# Patient Record
Sex: FEMALE | Race: BLACK OR AFRICAN AMERICAN | NOT HISPANIC OR LATINO | Employment: OTHER | URBAN - METROPOLITAN AREA
[De-identification: names, ages, dates, MRNs, and addresses within clinical notes are randomized per-mention and may not be internally consistent; named-entity substitution may affect disease eponyms.]

---

## 2017-08-01 ENCOUNTER — TELEPHONE (OUTPATIENT)
Dept: HEMATOLOGY/ONCOLOGY | Facility: CLINIC | Age: 82
End: 2017-08-01

## 2017-08-01 NOTE — TELEPHONE ENCOUNTER
----- Message from Ramez Bonner sent at 8/1/2017 11:06 AM CDT -----  Contact: Pamela ( at the [facility])  Please call Pamela ( at the [facility]) to schedule an appointment for the Patient. Contact Number [phone].      Called patient to make appointment for Anemia consult with Dr Stauffer. Patient wanted the appointment with Dr Wang. Patient said that she was in the ER(outside [facility]) yesterday. She had a blood transfusion. She said that she will bring her ER records to the clinic visit.

## 2017-08-03 ENCOUNTER — LAB VISIT (OUTPATIENT)
Dept: LAB | Facility: HOSPITAL | Age: 82
End: 2017-08-03
Payer: MEDICARE

## 2017-08-03 ENCOUNTER — INITIAL CONSULT (OUTPATIENT)
Dept: HEMATOLOGY/ONCOLOGY | Facility: CLINIC | Age: 82
End: 2017-08-03
Payer: MEDICARE

## 2017-08-03 VITALS
WEIGHT: 127.44 LBS | BODY MASS INDEX: 21.23 KG/M2 | DIASTOLIC BLOOD PRESSURE: 79 MMHG | SYSTOLIC BLOOD PRESSURE: 190 MMHG | TEMPERATURE: 99 F | HEIGHT: 65 IN

## 2017-08-03 DIAGNOSIS — D69.6 THROMBOCYTOPENIA: ICD-10-CM

## 2017-08-03 DIAGNOSIS — D63.8 ANEMIA OF OTHER CHRONIC DISEASE: ICD-10-CM

## 2017-08-03 DIAGNOSIS — E03.9 HYPOTHYROIDISM: ICD-10-CM

## 2017-08-03 DIAGNOSIS — D63.8 ANEMIA OF OTHER CHRONIC DISEASE: Primary | ICD-10-CM

## 2017-08-03 LAB
ABO + RH BLD: NORMAL
ALBUMIN SERPL BCP-MCNC: 3.5 G/DL
ALP SERPL-CCNC: 83 U/L
ALT SERPL W/O P-5'-P-CCNC: 19 U/L
ANION GAP SERPL CALC-SCNC: 7 MMOL/L
AST SERPL-CCNC: 29 U/L
BASOPHILS # BLD AUTO: 0 K/UL
BASOPHILS NFR BLD: 0 %
BILIRUB SERPL-MCNC: 0.9 MG/DL
BLD GP AB SCN CELLS X3 SERPL QL: NORMAL
BUN SERPL-MCNC: 17 MG/DL
CALCIUM SERPL-MCNC: 9.6 MG/DL
CHLORIDE SERPL-SCNC: 109 MMOL/L
CO2 SERPL-SCNC: 26 MMOL/L
CREAT SERPL-MCNC: 1.4 MG/DL
DAT IGG-SP REAG RBC-IMP: NORMAL
DIFFERENTIAL METHOD: ABNORMAL
EOSINOPHIL # BLD AUTO: 0.4 K/UL
EOSINOPHIL NFR BLD: 16.9 %
ERYTHROCYTE [DISTWIDTH] IN BLOOD BY AUTOMATED COUNT: 24.9 %
EST. GFR  (AFRICAN AMERICAN): 40.6 ML/MIN/1.73 M^2
EST. GFR  (NON AFRICAN AMERICAN): 35.3 ML/MIN/1.73 M^2
FERRITIN SERPL-MCNC: 576 NG/ML
FOLATE SERPL-MCNC: 35.2 NG/ML
GLUCOSE SERPL-MCNC: 89 MG/DL
HAPTOGLOB SERPL-MCNC: 62 MG/DL
HCT VFR BLD AUTO: 24.7 %
HGB BLD-MCNC: 8.5 G/DL
IRON SERPL-MCNC: 122 UG/DL
LDH SERPL L TO P-CCNC: 184 U/L
LYMPHOCYTES # BLD AUTO: 1.3 K/UL
LYMPHOCYTES NFR BLD: 55 %
MCH RBC QN AUTO: 32 PG
MCHC RBC AUTO-ENTMCNC: 34.4 G/DL
MCV RBC AUTO: 93 FL
MONOCYTES # BLD AUTO: 0.1 K/UL
MONOCYTES NFR BLD: 5.2 %
NEUTROPHILS # BLD AUTO: 0.5 K/UL
NEUTROPHILS NFR BLD: 22.9 %
PATH REV BLD -IMP: NORMAL
PLATELET # BLD AUTO: 24 K/UL
PLATELET BLD QL SMEAR: ABNORMAL
PMV BLD AUTO: ABNORMAL FL
POTASSIUM SERPL-SCNC: 3.8 MMOL/L
PROT SERPL-MCNC: 8.5 G/DL
RBC # BLD AUTO: 2.66 M/UL
RETICS/RBC NFR AUTO: 1 %
SATURATED IRON: 58 %
SODIUM SERPL-SCNC: 142 MMOL/L
TOTAL IRON BINDING CAPACITY: 209 UG/DL
TRANSFERRIN SERPL-MCNC: 141 MG/DL
TSH SERPL DL<=0.005 MIU/L-ACNC: 2.38 UIU/ML
VIT B12 SERPL-MCNC: 362 PG/ML
WBC # BLD AUTO: 2.31 K/UL

## 2017-08-03 PROCEDURE — 84165 PROTEIN E-PHORESIS SERUM: CPT | Mod: 26,,, | Performed by: PATHOLOGY

## 2017-08-03 PROCEDURE — 86334 IMMUNOFIX E-PHORESIS SERUM: CPT

## 2017-08-03 PROCEDURE — 99214 OFFICE O/P EST MOD 30 MIN: CPT | Mod: S$GLB,,, | Performed by: INTERNAL MEDICINE

## 2017-08-03 PROCEDURE — 83010 ASSAY OF HAPTOGLOBIN QUANT: CPT

## 2017-08-03 PROCEDURE — 86900 BLOOD TYPING SEROLOGIC ABO: CPT

## 2017-08-03 PROCEDURE — 1159F MED LIST DOCD IN RCRD: CPT | Mod: S$GLB,,, | Performed by: INTERNAL MEDICINE

## 2017-08-03 PROCEDURE — 85045 AUTOMATED RETICULOCYTE COUNT: CPT

## 2017-08-03 PROCEDURE — 99999 PR PBB SHADOW E&M-EST. PATIENT-LVL V: CPT | Mod: PBBFAC,,, | Performed by: INTERNAL MEDICINE

## 2017-08-03 PROCEDURE — 84443 ASSAY THYROID STIM HORMONE: CPT

## 2017-08-03 PROCEDURE — 83615 LACTATE (LD) (LDH) ENZYME: CPT

## 2017-08-03 PROCEDURE — 86880 COOMBS TEST DIRECT: CPT

## 2017-08-03 PROCEDURE — 82746 ASSAY OF FOLIC ACID SERUM: CPT

## 2017-08-03 PROCEDURE — 86901 BLOOD TYPING SEROLOGIC RH(D): CPT

## 2017-08-03 PROCEDURE — 1126F AMNT PAIN NOTED NONE PRSNT: CPT | Mod: S$GLB,,, | Performed by: INTERNAL MEDICINE

## 2017-08-03 PROCEDURE — 82728 ASSAY OF FERRITIN: CPT

## 2017-08-03 PROCEDURE — 83540 ASSAY OF IRON: CPT

## 2017-08-03 PROCEDURE — 84165 PROTEIN E-PHORESIS SERUM: CPT

## 2017-08-03 PROCEDURE — 86334 IMMUNOFIX E-PHORESIS SERUM: CPT | Mod: 26,,, | Performed by: PATHOLOGY

## 2017-08-03 PROCEDURE — 85025 COMPLETE CBC W/AUTO DIFF WBC: CPT

## 2017-08-03 PROCEDURE — 36415 COLL VENOUS BLD VENIPUNCTURE: CPT

## 2017-08-03 PROCEDURE — 82607 VITAMIN B-12: CPT

## 2017-08-03 PROCEDURE — 80053 COMPREHEN METABOLIC PANEL: CPT

## 2017-08-03 NOTE — PROGRESS NOTES
HEMATOLOGY/ONCOLOGY CONSULT    Requesting physician: Dr. Carter     Reason for consult: Anemia    HPI:   Shara Rose is a 82 y.o. female who is referred for a Hematology consultation for further evaluation of anemia. Patient's past medical history includes Sickle Cell Trait, Hypothyroidism, Chronic Kidney Disease, and Normocytic Anemia who was previously seen by Dr. Flores in the clinic for evaluation of anemia in 2011 and 2014. Underwent extensive work-up including bone marrow evaluation in 2011 which showed 80% cellularity with erythroid hyperplasia and megaloblastoid maturation of erythroid precursors. Cytogenetics were unremarkable. Adequate storage iron with increased ring sideroblast, adequate number of megakaryocytes. Blasts were only 1.5%. Refractory anemia with ring sideroblasts was considered as differential. She also had Hb Electrophoresis performed which was compatible with the patient's history of Sickle Cell Trait. During that visit, she was noted to have elevated LEVON and was referred to Rheumatology and was lost to follow up. Over the course of last several years, her baseline Hb of 9-10 g/dL has gradually declined along with onset of severe thrombocytopenia since 2014. Last lab work in our records from 9/14/16 showed Hb: 8.1 g/dL and Plt count of 31,000/mm3. She states for past 6 months, she has been severely anemic, requiring blood transfusions every 2 months. Has received total of 6 units of blood already this year. Most recent being last week when she presented to the PCP at an outside clinic with complaint of weakness and was found to have severe anemia with Hb of 4 g/dL. She was admitted in [facility] and was transfused 2 units of blood and referred here for further evaluation. She denies any melena or hematuria. Last colonoscopy was in 2012, which was unremarkable. Denies any weight loss, or fever/chills. However, she does report night sweats. Denies any lymphadenopathy or  rash.      Past Medical History:   Diagnosis Date    Allergy     Anemia     Arthritis     Cataract     CKD (chronic kidney disease)     Gout, unspecified     Hypertension     Hypothyroidism     Menopause     Sickle cell trait     Trigger finger      Past Surgical History:   Procedure Laterality Date    APPENDECTOMY      HEMORRHOID SURGERY      HYSTERECTOMY       Social History     Social History    Marital status:      Spouse name: N/A    Number of children: N/A    Years of education: N/A     Occupational History    Not on file.     Social History Main Topics    Smoking status: Former Smoker     Packs/day: 0.25     Years: 1.00     Types: Cigarettes     Quit date: 10/19/1972    Smokeless tobacco: Never Used      Comment: 10 yrs; she smoked 2 packs a week.    Alcohol use No    Drug use: No    Sexual activity: No     Other Topics Concern    Not on file     Social History Narrative    No narrative on file     Family History   Problem Relation Age of Onset    Hypertension Mother     Diabetes Neg Hx     Amblyopia Neg Hx     Blindness Neg Hx     Glaucoma Neg Hx     Macular degeneration Neg Hx     Retinal detachment Neg Hx     Strabismus Neg Hx     Peripheral vascular disease Father     Heart disease Father     Cataracts Father     Cancer Son      Allergies:   Sulfa (sulfonamide antibiotics)    Medications:     Current Outpatient Prescriptions   Medication Sig Dispense Refill    clotrimazole-betamethasone 1-0.05% (LOTRISONE) cream Apply topically 2 (two) times daily. 45 g 3    levothyroxine (SYNTHROID) 25 MCG tablet TAKE 1 TABLET ONE TIME DAILY 90 tablet 3    valsartan (DIOVAN) 80 MG tablet TAKE 1 TABLET ONE TIME DAILY 90 tablet 3    verapamil (CALAN-SR) 240 MG CR tablet TAKE 1 TABLET EVERY DAY 90 tablet 3     No current facility-administered medications for this visit.      Review Of Systems:   Gen: night sweats  HEENT: negative for headaches  Pulm: negative for shortness of  breath  CV: negative for palpitations or chest pain  GI: negative for abdominal pain  : negative  Skin: negative for rash  MS/Neuro: weakness  Psych: negative    Physical Exam:   Performance Status: 1  General: well developed, well nourished, appears stated age, no distress, thin female  Eyes: conjunctivae/corneas clear. PERRL..  HENT: Head:normocephalic, atraumatic. Ears:not examined. Nose: Nares normal. Septum midline. Mucosa normal. No drainage or sinus tenderness., no discharge. Throat: lips, mucosa, and tongue normal; teeth and gums normal and no throat erythema.  Neck: supple, symmetrical, trachea midline, no JVD and thyroid not enlarged, symmetric, no tenderness/mass/nodules  Lungs:  clear to auscultation bilaterally and normal respiratory effort  Cardiovascular: Heart: regular rate and rhythm, S1, S2 normal, no murmur, click, rub or gallop. Chest Wall: no tenderness. Extremities: no cyanosis or edema, or clubbing. Pulses: 2+ and symmetric.  Abdomen/Rectal: Abdomen: soft, non-tender non-distented; bowel sounds normal; no masses,  no organomegaly. Rectal: not examined  Skin: Skin color, texture, turgor normal. No rashes or lesions  Musculoskeletal:normal gait  Lymph Nodes: No cervical or supraclavicular adenopathy  Neurologic: Normal strength and tone. No focal numbness or weakness  Psych: pleasant and appropriate mood and affect      Diagnostic Tests:   Labs: Reviewed    Assessment:     1. Normocytic Anemia  2. Thrombocytopenia    Recommendations:     - likely patient has lower than normal baseline hemoglobin given her history of Sickle Cell Trait. However, given her age, gradual decline in the her counts over past 7 years and now with transfusion dependence, we are concerned about possible Myelodysplasia.   - will obtain routine lab work today to assess for hemolysis, nutritional deficiencies, and screening test for plasma cell dyscrasia.   - we discussed with patient that bone marrow evaluation is  absolute necessary to confirm our suspicion for MDS and she is in agreement for bone marrow biopsy. Consent obtained today.   - will plan to obtain biopsy hopefully next week on Monday August 7th.     Return to clinic with Dr. Wang 1 week after bone marrow biopsy with CBC.     Case discussed with Dr. Wang.     Esteban Zhong MD  Hematology/Oncology Fellow       Agree with Dr. Zhong's  history, physical, assessment, and plan with the following addendums.  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  Previously followed by Dr. Flores, but lost to fu.   Findings concerning for primary bone marrow process.  Needs bone marrow biopsy within the next week.  Nutritional and hemolytic parameters normal.  SPEP/IEF pending.    No indication for transfusions today .    FU:  Bone marrrow biopsy under sedation within the next week (urgent)  -cbc, cmp,type and screen and md appt in 2 weeks

## 2017-08-03 NOTE — LETTER
August 3, 2017      Alana Carter MD  [address] [facility]  [place] [address]           [facility]  [address]  [place] [address]  Phone: [phone]          Patient: Shara Rose   MR Number: [id number]   YOB: 1935   Date of Visit: 8/3/2017       Dear Dr. Alana Carter:    Thank you for referring Shara Rose to me for evaluation. Attached you will find relevant portions of my assessment and plan of care.    If you have questions, please do not hesitate to call me. I look forward to following Shara Rose along with you.    Sincerely,    Esteban Zhong MD    Enclosure  CC:  No Recipients    If you would like to receive this communication electronically, please contact [email or web address] or [phone] to request more information on [employer] access.    For providers and/or their staff who would like to refer a patient to [facility], please contact us through our one-stop-shop provider referral line, Narcisa Mc, at [phone].    If you feel you have received this communication in error or would no longer like to receive these types of communications, please e-mail [email or web address]

## 2017-08-04 ENCOUNTER — TELEPHONE (OUTPATIENT)
Dept: HEMATOLOGY/ONCOLOGY | Facility: CLINIC | Age: 82
End: 2017-08-04

## 2017-08-04 DIAGNOSIS — D64.9 ANEMIA, UNSPECIFIED TYPE: Primary | ICD-10-CM

## 2017-08-04 DIAGNOSIS — D69.6 THROMBOCYTOPENIA: ICD-10-CM

## 2017-08-04 DIAGNOSIS — D63.8 ANEMIA OF OTHER CHRONIC DISEASE: Primary | ICD-10-CM

## 2017-08-04 LAB
ALBUMIN SERPL ELPH-MCNC: 3.88 G/DL
ALPHA1 GLOB SERPL ELPH-MCNC: 0.28 G/DL
ALPHA2 GLOB SERPL ELPH-MCNC: 0.56 G/DL
B-GLOBULIN SERPL ELPH-MCNC: 0.85 G/DL
GAMMA GLOB SERPL ELPH-MCNC: 2.73 G/DL
INTERPRETATION SERPL IFE-IMP: NORMAL
PATHOLOGIST INTERPRETATION IFE: NORMAL
PROT SERPL-MCNC: 8.3 G/DL

## 2017-08-04 NOTE — TELEPHONE ENCOUNTER
Called pt to schedule BMBX , pt was told to report to the second floor in the main hospital Same Day Surgery Dept. Pt was told to be NPO starting at midnight the day prior to surgery. Pt was advised to hold all blood thinning medications prior to his BMBX & was advised to have a ride home. Pt voiced verbal understanding of these directions. Will also mail instruction sheet to pt's home.  Case request pended and routed to Oneyda Mejia.    Yue Hionjosa

## 2017-08-07 ENCOUNTER — HOSPITAL ENCOUNTER (OUTPATIENT)
Facility: HOSPITAL | Age: 82
Discharge: HOME | End: 2017-08-07
Attending: INTERNAL MEDICINE | Admitting: INTERNAL MEDICINE
Payer: MEDICARE

## 2017-08-07 ENCOUNTER — SURGERY (OUTPATIENT)
Age: 82
End: 2017-08-07

## 2017-08-07 ENCOUNTER — ANESTHESIA (OUTPATIENT)
Dept: SURGERY | Facility: HOSPITAL | Age: 82
End: 2017-08-07
Payer: MEDICARE

## 2017-08-07 ENCOUNTER — ANESTHESIA EVENT (OUTPATIENT)
Dept: SURGERY | Facility: HOSPITAL | Age: 82
End: 2017-08-07
Payer: MEDICARE

## 2017-08-07 VITALS
SYSTOLIC BLOOD PRESSURE: 185 MMHG | TEMPERATURE: 98 F | HEIGHT: 65 IN | OXYGEN SATURATION: 100 % | WEIGHT: 135 LBS | RESPIRATION RATE: 18 BRPM | HEART RATE: 65 BPM | DIASTOLIC BLOOD PRESSURE: 89 MMHG | BODY MASS INDEX: 22.49 KG/M2

## 2017-08-07 DIAGNOSIS — D69.1 THROMBOCYTOPATHIA: ICD-10-CM

## 2017-08-07 DIAGNOSIS — D64.9 ANEMIA: ICD-10-CM

## 2017-08-07 DIAGNOSIS — D69.6 THROMBOCYTOPENIA: Primary | ICD-10-CM

## 2017-08-07 LAB
BONE MARROW WRIGHT STAIN COMMENT: NORMAL
PATHOLOGIST INTERPRETATION SPE: NORMAL

## 2017-08-07 PROCEDURE — 88313 SPECIAL STAINS GROUP 2: CPT

## 2017-08-07 PROCEDURE — 88299 UNLISTED CYTOGENETIC STUDY: CPT

## 2017-08-07 PROCEDURE — 71000015 HC POSTOP RECOV 1ST HR: Performed by: INTERNAL MEDICINE

## 2017-08-07 PROCEDURE — 71000044 HC DOSC ROUTINE RECOVERY FIRST HOUR: Performed by: INTERNAL MEDICINE

## 2017-08-07 PROCEDURE — 88271 CYTOGENETICS DNA PROBE: CPT | Mod: 59

## 2017-08-07 PROCEDURE — 36000704 HC OR TIME LEV I 1ST 15 MIN: Performed by: INTERNAL MEDICINE

## 2017-08-07 PROCEDURE — 25000003 PHARM REV CODE 250: Performed by: INTERNAL MEDICINE

## 2017-08-07 PROCEDURE — 25000003 PHARM REV CODE 250: Performed by: NURSE ANESTHETIST, CERTIFIED REGISTERED

## 2017-08-07 PROCEDURE — D9220A PRA ANESTHESIA: Mod: ANES,,, | Performed by: ANESTHESIOLOGY

## 2017-08-07 PROCEDURE — 37000008 HC ANESTHESIA 1ST 15 MINUTES: Performed by: INTERNAL MEDICINE

## 2017-08-07 PROCEDURE — 88271 CYTOGENETICS DNA PROBE: CPT

## 2017-08-07 PROCEDURE — 88275 CYTOGENETICS 100-300: CPT

## 2017-08-07 PROCEDURE — 88275 CYTOGENETICS 100-300: CPT | Mod: 59

## 2017-08-07 PROCEDURE — 88274 CYTOGENETICS 25-99: CPT

## 2017-08-07 PROCEDURE — 88237 TISSUE CULTURE BONE MARROW: CPT

## 2017-08-07 PROCEDURE — 38221 DX BONE MARROW BIOPSIES: CPT | Mod: LT,,, | Performed by: NURSE PRACTITIONER

## 2017-08-07 PROCEDURE — 88342 IMHCHEM/IMCYTCHM 1ST ANTB: CPT | Mod: 26,59,, | Performed by: PATHOLOGY

## 2017-08-07 PROCEDURE — 88184 FLOWCYTOMETRY/ TC 1 MARKER: CPT | Performed by: PATHOLOGY

## 2017-08-07 PROCEDURE — 88291 CYTO/MOLECULAR REPORT: CPT | Mod: 59

## 2017-08-07 PROCEDURE — 88189 FLOWCYTOMETRY/READ 16 & >: CPT | Mod: ,,, | Performed by: PATHOLOGY

## 2017-08-07 PROCEDURE — 88313 SPECIAL STAINS GROUP 2: CPT | Mod: 26,,, | Performed by: PATHOLOGY

## 2017-08-07 PROCEDURE — 88305 TISSUE EXAM BY PATHOLOGIST: CPT | Performed by: PATHOLOGY

## 2017-08-07 PROCEDURE — 85097 BONE MARROW INTERPRETATION: CPT | Mod: ,,, | Performed by: PATHOLOGY

## 2017-08-07 PROCEDURE — 63600175 PHARM REV CODE 636 W HCPCS: Performed by: NURSE ANESTHETIST, CERTIFIED REGISTERED

## 2017-08-07 PROCEDURE — 88311 DECALCIFY TISSUE: CPT | Mod: 26,,, | Performed by: PATHOLOGY

## 2017-08-07 PROCEDURE — 37000009 HC ANESTHESIA EA ADD 15 MINS: Performed by: INTERNAL MEDICINE

## 2017-08-07 PROCEDURE — 88264 CHROMOSOME ANALYSIS 20-25: CPT

## 2017-08-07 PROCEDURE — D9220A PRA ANESTHESIA: Mod: CRNA,,, | Performed by: NURSE ANESTHETIST, CERTIFIED REGISTERED

## 2017-08-07 PROCEDURE — 88341 IMHCHEM/IMCYTCHM EA ADD ANTB: CPT | Mod: 26,,, | Performed by: PATHOLOGY

## 2017-08-07 PROCEDURE — 36000705 HC OR TIME LEV I EA ADD 15 MIN: Performed by: INTERNAL MEDICINE

## 2017-08-07 PROCEDURE — 88185 FLOWCYTOMETRY/TC ADD-ON: CPT | Performed by: PATHOLOGY

## 2017-08-07 RX ORDER — FENTANYL CITRATE 50 UG/ML
25 INJECTION, SOLUTION INTRAMUSCULAR; INTRAVENOUS EVERY 5 MIN PRN
Status: DISCONTINUED | OUTPATIENT
Start: 2017-08-07 | End: 2017-08-07 | Stop reason: HOSPADM

## 2017-08-07 RX ORDER — SODIUM CHLORIDE 9 MG/ML
INJECTION, SOLUTION INTRAVENOUS CONTINUOUS PRN
Status: DISCONTINUED | OUTPATIENT
Start: 2017-08-07 | End: 2017-08-07

## 2017-08-07 RX ORDER — PROPOFOL 10 MG/ML
VIAL (ML) INTRAVENOUS
Status: DISCONTINUED | OUTPATIENT
Start: 2017-08-07 | End: 2017-08-07

## 2017-08-07 RX ORDER — MIDAZOLAM HYDROCHLORIDE 1 MG/ML
INJECTION, SOLUTION INTRAMUSCULAR; INTRAVENOUS
Status: DISCONTINUED | OUTPATIENT
Start: 2017-08-07 | End: 2017-08-07

## 2017-08-07 RX ORDER — LIDOCAINE HYDROCHLORIDE 10 MG/ML
INJECTION, SOLUTION EPIDURAL; INFILTRATION; INTRACAUDAL; PERINEURAL
Status: DISCONTINUED | OUTPATIENT
Start: 2017-08-07 | End: 2017-08-07 | Stop reason: HOSPADM

## 2017-08-07 RX ORDER — LIDOCAINE HCL/PF 100 MG/5ML
SYRINGE (ML) INTRAVENOUS
Status: DISCONTINUED | OUTPATIENT
Start: 2017-08-07 | End: 2017-08-07

## 2017-08-07 RX ORDER — SODIUM CHLORIDE 0.9 % (FLUSH) 0.9 %
3 SYRINGE (ML) INJECTION
Status: DISCONTINUED | OUTPATIENT
Start: 2017-08-07 | End: 2017-08-07 | Stop reason: HOSPADM

## 2017-08-07 RX ADMIN — PROPOFOL 20 MG: 10 INJECTION, EMULSION INTRAVENOUS at 11:08

## 2017-08-07 RX ADMIN — LIDOCAINE HYDROCHLORIDE 100 MG: 20 INJECTION, SOLUTION INTRAVENOUS at 10:08

## 2017-08-07 RX ADMIN — MIDAZOLAM HYDROCHLORIDE 1 MG: 1 INJECTION, SOLUTION INTRAMUSCULAR; INTRAVENOUS at 10:08

## 2017-08-07 RX ADMIN — SODIUM CHLORIDE: 0.9 INJECTION, SOLUTION INTRAVENOUS at 10:08

## 2017-08-07 RX ADMIN — PROPOFOL 50 MG: 10 INJECTION, EMULSION INTRAVENOUS at 10:08

## 2017-08-07 RX ADMIN — LIDOCAINE HYDROCHLORIDE 5 ML: 10 INJECTION, SOLUTION EPIDURAL; INFILTRATION; INTRACAUDAL; PERINEURAL at 10:08

## 2017-08-07 NOTE — INTERVAL H&P NOTE
The patient has been examined and the H&P has been reviewed:    I concur with the findings and no changes have occurred since H&P was written. Okay to proceed with BM bx and aspiration.    Anesthesia/Surgery risks, benefits and alternative options discussed and understood by patient/family.          There are no hospital problems to display for this patient.

## 2017-08-07 NOTE — ANESTHESIA POSTPROCEDURE EVALUATION
"Anesthesia Post Evaluation    Patient: Shara Rose    Procedure(s) Performed: Procedure(s) (LRB):  BIOPSY-BONE MARROW (Left)    Final Anesthesia Type: general  Patient location during evaluation: PACU  Patient participation: Yes- Able to Participate  Level of consciousness: awake and alert and oriented  Post-procedure vital signs: reviewed and stable  Pain management: adequate  Airway patency: patent  PONV status at discharge: No PONV  Anesthetic complications: no      Cardiovascular status: hemodynamically stable, hypertensive and blood pressure returned to baseline (Patient was hypertensive upon admission. I instructed her to take her blood pressure medication when she arrives home and to followm up with her primary care or to return to the ED with any headaches, chest painm, shortness of breathe. )  Respiratory status: unassisted, room air and spontaneous ventilation  Hydration status: euvolemic  Follow-up not needed.        Visit Vitals  BP (!) 185/89   Pulse 65   Temp 36.8 °C (98.2 °F) (Temporal)   Resp 18   Ht 5' 5" (1.651 m)   Wt 61.2 kg (135 lb)   SpO2 100%   Breastfeeding? No   BMI 22.47 kg/m²       Pain/Elliott Score: Pain Assessment Performed: Yes (8/7/2017 11:45 AM)  Presence of Pain: denies (8/7/2017 11:45 AM)  Elliott Score: 10 (8/7/2017 11:45 AM)      "

## 2017-08-07 NOTE — DISCHARGE SUMMARY
[facility]  Hematology/Oncology  Discharge Summary      Patient Name: Shara Rose  MRN: [id number]  Admission Date: 8/7/2017  Hospital Length of Stay: 0 days  Discharge Date and Time:  08/07/2017 11:20 AM  Attending Physician: Juan Wang MD   Discharging Provider: Nidhi Bai NP  Primary Care Provider: Alana Carter MD    Procedure(s) (LRB):  BIOPSY-BONE MARROW (N/A)     Hospital Course: Patient admitted to pre op today for a bone marrow aspiration and biopsy. Pt was consented for a bone marrow biopsy. Patient was sedated per anesthesia and a bone marrow biopsy and aspiration was performed in the OR (see procedure note). Patient was then transferred to post op and discharged home when appropriate per anesthesia.     Pending Diagnostic Studies:     None        There are no hospital problems to display for this patient.     Discharged Condition: good    Disposition: Home or Self Care    Follow Up: with Dr. Wang and Dr. Zhong in BMT clinic.    Patient Instructions:     Diet general     Activity as tolerated     Call MD for:  severe uncontrolled pain     Call MD for:  temperature >100.4     Call MD for:  redness, tenderness, or signs of infection (pain, swelling, redness, odor or green/yellow discharge around incision site)     Remove dressing in 24 hours       Medications:  Reconciled Home Medications:   Current Discharge Medication List      CONTINUE these medications which have NOT CHANGED    Details   clotrimazole-betamethasone 1-0.05% (LOTRISONE) cream Apply topically 2 (two) times daily.  Qty: 45 g, Refills: 3      levothyroxine (SYNTHROID) 25 MCG tablet TAKE 1 TABLET ONE TIME DAILY  Qty: 90 tablet, Refills: 3      valsartan (DIOVAN) 80 MG tablet TAKE 1 TABLET ONE TIME DAILY  Qty: 90 tablet, Refills: 3      verapamil (CALAN-SR) 240 MG CR tablet TAKE 1 TABLET EVERY DAY  Qty: 90 tablet, Refills: 3             Nidhi Bai NP  Hematology/Oncology  [facility]  [place]

## 2017-08-07 NOTE — ANESTHESIA PREPROCEDURE EVALUATION
08/07/2017  Shara Rose is a 82 y.o., female.    Anesthesia Evaluation    I have reviewed the Patient Summary Reports.     I have reviewed the Medications.     Review of Systems  Anesthesia Hx:  No problems with previous Anesthesia Denies Hx of Anesthetic complications  History of prior surgery of interest to airway management or planning: Previous anesthesia: General Denies Family Hx of Anesthesia complications.   Denies Personal Hx of Anesthesia complications.   Social:  Former Smoker    Hematology/Oncology:         -- Anemia: Hematology Comments: Sickle cell trait   EENT/Dental:  EENT/Dental Normal denies chronic allergic rhinitis Nuclear sclerosis   Cardiovascular:   Hypertension    Pulmonary:  Pulmonary Normal  Denies Shortness of breath.  Denies Recent URI.    Renal/:   Chronic Renal Disease, CRI    Hepatic/GI:  Hepatic/GI Normal  Denies GERD.    Musculoskeletal:   Arthritis     Neurological:  Neurology Normal    Endocrine:   Hypothyroidism Stable synthroid dose   Dermatological:  Skin Normal    Psych:  Psychiatric Normal           Physical Exam  General:  Well nourished    Airway/Jaw/Neck:  Airway Findings: Mouth Opening: Normal Tongue: Normal  General Airway Assessment: Adult  Mallampati: II  TM Distance: Normal, at least 6 cm  Jaw/Neck Findings:  Neck ROM: Normal ROM     Eyes/Ears/Nose:  EYES/EARS/NOSE FINDINGS: Normal   Dental:  Dental Findings:    Chest/Lungs:  Chest/Lungs Findings: Normal Respiratory Rate     Heart/Vascular:  Heart Findings: Rate: Normal  Rhythm: Regular Rhythm     Abdomen:  Abdomen Findings: Normal    Musculoskeletal:  Musculoskeletal Findings: Normal    Mental Status:  Mental Status Findings:  Cooperative, Alert and Oriented         Anesthesia Plan  Type of Anesthesia, risks & benefits discussed:  Anesthesia Type:  general, MAC  Patient's Preference: General or  MAC  Intra-op Monitoring Plan: standard ASA monitors  Intra-op Monitoring Plan Comments:   Post Op Pain Control Plan: IV/PO Opioids PRN  Post Op Pain Control Plan Comments:   Induction:   IV  Beta Blocker:  Patient is not currently on a Beta-Blocker (No further documentation required).       Informed Consent: Patient understands risks and agrees with Anesthesia plan.  Questions answered. Anesthesia consent signed with patient.  ASA Score: 2     Day of Surgery Review of History & Physical:    H&P update referred to the surgeon.         Ready For Surgery From Anesthesia Perspective.

## 2017-08-07 NOTE — PROCEDURES
PROCEDURE NOTE:  Date of Procedure: 8/7/17  Bone Marrow Biopsy and Aspiration  Indication: Anemia, thrombocytopenia, possible MDS?  Consent: Informed consent was obtained from patient.  Timeout: Done and documented.  Position: Right lateral.  Site: Left posterior illiac crest.  Prep: Betadine.  Needle used: 11 gauge Jamshidi needle.  Anesthetic: 1% lidocaine 5 cc.  Biopsy: The biopsy needle was introduced into the marrow cavity and an aspirate was obtained without complications and sent for flow cytometry, MDS FISH, PCPD FISH, hematologic disorder DNA/RNA hold, and cytogenetics. Two core biopsies (first was small) obtained without difficulty and sent for routine histologic examination.  Complications: None.  Disposition: The patient was discharged home per anesthesia protocol.  Blood loss: Minimal.     Nidhi Bai DNP, NP  Hematology/Oncology

## 2017-08-07 NOTE — H&P (VIEW-ONLY)
HEMATOLOGY/ONCOLOGY CONSULT    Requesting physician: Dr. Carter     Reason for consult: Anemia    HPI:   Shara Rose is a 82 y.o. female who is referred for a Hematology consultation for further evaluation of anemia. Patient's past medical history includes Sickle Cell Trait, Hypothyroidism, Chronic Kidney Disease, and Normocytic Anemia who was previously seen by Dr. Flores in the clinic for evaluation of anemia in 2011 and 2014. Underwent extensive work-up including bone marrow evaluation in 2011 which showed 80% cellularity with erythroid hyperplasia and megaloblastoid maturation of erythroid precursors. Cytogenetics were unremarkable. Adequate storage iron with increased ring sideroblast, adequate number of megakaryocytes. Blasts were only 1.5%. Refractory anemia with ring sideroblasts was considered as differential. She also had Hb Electrophoresis performed which was compatible with the patient's history of Sickle Cell Trait. During that visit, she was noted to have elevated LEVON and was referred to Rheumatology and was lost to follow up. Over the course of last several years, her baseline Hb of 9-10 g/dL has gradually declined along with onset of severe thrombocytopenia since 2014. Last lab work in our records from 9/14/16 showed Hb: 8.1 g/dL and Plt count of 31,000/mm3. She states for past 6 months, she has been severely anemic, requiring blood transfusions every 2 months. Has received total of 6 units of blood already this year. Most recent being last week when she presented to the PCP at an outside clinic with complaint of weakness and was found to have severe anemia with Hb of 4 g/dL. She was admitted in [facility] and was transfused 2 units of blood and referred here for further evaluation. She denies any melena or hematuria. Last colonoscopy was in 2012, which was unremarkable. Denies any weight loss, or fever/chills. However, she does report night sweats. Denies any lymphadenopathy or  rash.      Past Medical History:   Diagnosis Date    Allergy     Anemia     Arthritis     Cataract     CKD (chronic kidney disease)     Gout, unspecified     Hypertension     Hypothyroidism     Menopause     Sickle cell trait     Trigger finger      Past Surgical History:   Procedure Laterality Date    APPENDECTOMY      HEMORRHOID SURGERY      HYSTERECTOMY       Social History     Social History    Marital status:      Spouse name: N/A    Number of children: N/A    Years of education: N/A     Occupational History    Not on file.     Social History Main Topics    Smoking status: Former Smoker     Packs/day: 0.25     Years: 1.00     Types: Cigarettes     Quit date: 10/19/1972    Smokeless tobacco: Never Used      Comment: 10 yrs; she smoked 2 packs a week.    Alcohol use No    Drug use: No    Sexual activity: No     Other Topics Concern    Not on file     Social History Narrative    No narrative on file     Family History   Problem Relation Age of Onset    Hypertension Mother     Diabetes Neg Hx     Amblyopia Neg Hx     Blindness Neg Hx     Glaucoma Neg Hx     Macular degeneration Neg Hx     Retinal detachment Neg Hx     Strabismus Neg Hx     Peripheral vascular disease Father     Heart disease Father     Cataracts Father     Cancer Son      Allergies:   Sulfa (sulfonamide antibiotics)    Medications:     Current Outpatient Prescriptions   Medication Sig Dispense Refill    clotrimazole-betamethasone 1-0.05% (LOTRISONE) cream Apply topically 2 (two) times daily. 45 g 3    levothyroxine (SYNTHROID) 25 MCG tablet TAKE 1 TABLET ONE TIME DAILY 90 tablet 3    valsartan (DIOVAN) 80 MG tablet TAKE 1 TABLET ONE TIME DAILY 90 tablet 3    verapamil (CALAN-SR) 240 MG CR tablet TAKE 1 TABLET EVERY DAY 90 tablet 3     No current facility-administered medications for this visit.      Review Of Systems:   Gen: night sweats  HEENT: negative for headaches  Pulm: negative for shortness of  breath  CV: negative for palpitations or chest pain  GI: negative for abdominal pain  : negative  Skin: negative for rash  MS/Neuro: weakness  Psych: negative    Physical Exam:   Performance Status: 1  General: well developed, well nourished, appears stated age, no distress, thin female  Eyes: conjunctivae/corneas clear. PERRL..  HENT: Head:normocephalic, atraumatic. Ears:not examined. Nose: Nares normal. Septum midline. Mucosa normal. No drainage or sinus tenderness., no discharge. Throat: lips, mucosa, and tongue normal; teeth and gums normal and no throat erythema.  Neck: supple, symmetrical, trachea midline, no JVD and thyroid not enlarged, symmetric, no tenderness/mass/nodules  Lungs:  clear to auscultation bilaterally and normal respiratory effort  Cardiovascular: Heart: regular rate and rhythm, S1, S2 normal, no murmur, click, rub or gallop. Chest Wall: no tenderness. Extremities: no cyanosis or edema, or clubbing. Pulses: 2+ and symmetric.  Abdomen/Rectal: Abdomen: soft, non-tender non-distented; bowel sounds normal; no masses,  no organomegaly. Rectal: not examined  Skin: Skin color, texture, turgor normal. No rashes or lesions  Musculoskeletal:normal gait  Lymph Nodes: No cervical or supraclavicular adenopathy  Neurologic: Normal strength and tone. No focal numbness or weakness  Psych: pleasant and appropriate mood and affect      Diagnostic Tests:   Labs: Reviewed    Assessment:     1. Normocytic Anemia  2. Thrombocytopenia    Recommendations:     - likely patient has lower than normal baseline hemoglobin given her history of Sickle Cell Trait. However, given her age, gradual decline in the her counts over past 7 years and now with transfusion dependence, we are concerned about possible Myelodysplasia.   - will obtain routine lab work today to assess for hemolysis, nutritional deficiencies, and screening test for plasma cell dyscrasia.   - we discussed with patient that bone marrow evaluation is  absolute necessary to confirm our suspicion for MDS and she is in agreement for bone marrow biopsy. Consent obtained today.   - will plan to obtain biopsy hopefully next week on Monday August 7th.     Return to clinic with Dr. Wang 1 week after bone marrow biopsy with CBC.     Case discussed with Dr. Wang.     Esteban Zhong MD  Hematology/Oncology Fellow       Agree with Dr. Zhong's  history, physical, assessment, and plan with the following addendums.  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  Previously followed by Dr. Flores, but lost to fu.   Findings concerning for primary bone marrow process.  Needs bone marrow biopsy within the next week.  Nutritional and hemolytic parameters normal.  SPEP/IEF pending.    No indication for transfusions today .    FU:  Bone marrrow biopsy under sedation within the next week (urgent)  -cbc, cmp,type and screen and md appt in 2 weeks

## 2017-08-07 NOTE — DISCHARGE INSTRUCTIONS
Discharge instructions for having a Bone Marrow Aspiration / Biopsy:    Keep Bandage in place for 24 hours.  - Do not shower or take a tube bath during this time (you may sponge bathe).  - Call the nurse or physician for excessive bleeding or pain at biopsy site.  - You may take Tylenol as needed for pain.    You have received medication to sedate you.  - Do not drive a car or operate heavy machinery for the rest of the day.  - You may resume other activities as tolerated.    You can call [phone] for any problems during the hours of 8:00 AM-5:00PM.    For an emergency after 5:00 PM you can call [phone] and have the  page the Hematologist / Oncologist on call.

## 2017-08-07 NOTE — TRANSFER OF CARE
"Anesthesia Transfer of Care Note    Patient: Shara Rose    Procedure(s) Performed: Procedure(s) (LRB):  BIOPSY-BONE MARROW (Left)    Patient location: [facility]    Anesthesia Type: general    Transport from OR: Transported from OR on 2-3 L/min O2 by NC with adequate spontaneous ventilation    Post pain: adequate analgesia    Post assessment: no apparent anesthetic complications    Post vital signs: stable    Level of consciousness: awake    Nausea/Vomiting: no nausea/vomiting    Complications: none    Transfer of care protocol was followed      Last vitals:   Visit Vitals  /63   Pulse 65   Temp 36.7 °C (98.1 °F) (Temporal)   Resp 18   Ht 5' 5" (1.651 m)   Wt 61.2 kg (135 lb)   SpO2 100%   Breastfeeding? No   BMI 22.47 kg/m²     "

## 2017-08-07 NOTE — PLAN OF CARE
Pt tolerated procedure/anesthesia well, vss, no distress noted or reported, tolerated PO without difficulties, pain controlled, discharge instructions reviewed with pt, verbalized understanding, consents with chart

## 2017-08-08 LAB — BONE MARROW IRON STAIN COMMENT: NORMAL

## 2017-08-09 LAB
BODY SITE - BONE MARROW: NORMAL
CLINICAL DIAGNOSIS - BONE MARROW: NORMAL
FLOW CYTOMETRY ANTIBODIES ANALYZED - BONE MARROW: NORMAL
FLOW CYTOMETRY COMMENT - BONE MARROW: NORMAL
FLOW CYTOMETRY INTERPRETATION - BONE MARROW: NORMAL

## 2017-08-11 DIAGNOSIS — D46.9 MDS (MYELODYSPLASTIC SYNDROME): Primary | ICD-10-CM

## 2017-08-14 ENCOUNTER — OFFICE VISIT (OUTPATIENT)
Dept: HEMATOLOGY/ONCOLOGY | Facility: CLINIC | Age: 82
End: 2017-08-14
Payer: MEDICARE

## 2017-08-14 ENCOUNTER — TELEPHONE (OUTPATIENT)
Dept: HEMATOLOGY/ONCOLOGY | Facility: CLINIC | Age: 82
End: 2017-08-14

## 2017-08-14 ENCOUNTER — LAB VISIT (OUTPATIENT)
Dept: LAB | Facility: HOSPITAL | Age: 82
End: 2017-08-14
Attending: INTERNAL MEDICINE
Payer: MEDICARE

## 2017-08-14 VITALS
HEIGHT: 65 IN | RESPIRATION RATE: 16 BRPM | OXYGEN SATURATION: 100 % | HEART RATE: 64 BPM | DIASTOLIC BLOOD PRESSURE: 78 MMHG | SYSTOLIC BLOOD PRESSURE: 205 MMHG | WEIGHT: 128.31 LBS | BODY MASS INDEX: 21.38 KG/M2 | TEMPERATURE: 98 F

## 2017-08-14 DIAGNOSIS — D46.9 MDS (MYELODYSPLASTIC SYNDROME): ICD-10-CM

## 2017-08-14 DIAGNOSIS — D69.6 THROMBOCYTOPENIA: ICD-10-CM

## 2017-08-14 DIAGNOSIS — D63.8 ANEMIA OF OTHER CHRONIC DISEASE: ICD-10-CM

## 2017-08-14 DIAGNOSIS — D47.2 MONOCLONAL GAMMOPATHY OF UNDETERMINED SIGNIFICANCE: Primary | ICD-10-CM

## 2017-08-14 DIAGNOSIS — D57.3 SICKLE CELL TRAIT: ICD-10-CM

## 2017-08-14 LAB
ABO + RH BLD: NORMAL
ANISOCYTOSIS BLD QL SMEAR: SLIGHT
BASOPHILS # BLD AUTO: 0.01 K/UL
BASOPHILS NFR BLD: 0.4 %
BLD GP AB SCN CELLS X3 SERPL QL: NORMAL
DIFFERENTIAL METHOD: ABNORMAL
DNA AND RNA EXTRACTION, BM: NORMAL
EOSINOPHIL # BLD AUTO: 0.3 K/UL
EOSINOPHIL NFR BLD: 11.9 %
ERYTHROCYTE [DISTWIDTH] IN BLOOD BY AUTOMATED COUNT: 23.8 %
HCT VFR BLD AUTO: 21.7 %
HGB BLD-MCNC: 7.2 G/DL
HYPOCHROMIA BLD QL SMEAR: ABNORMAL
LYMPHOCYTES # BLD AUTO: 1.4 K/UL
LYMPHOCYTES NFR BLD: 51.1 %
MCH RBC QN AUTO: 31.3 PG
MCHC RBC AUTO-ENTMCNC: 33.2 G/DL
MCV RBC AUTO: 94 FL
MONOCYTES # BLD AUTO: 0.1 K/UL
MONOCYTES NFR BLD: 2.2 %
NEUTROPHILS # BLD AUTO: 0.9 K/UL
NEUTROPHILS NFR BLD: 34.4 %
OVALOCYTES BLD QL SMEAR: ABNORMAL
PLATELET # BLD AUTO: 19 K/UL
PLATELET BLD QL SMEAR: ABNORMAL
PMV BLD AUTO: ABNORMAL FL
POIKILOCYTOSIS BLD QL SMEAR: SLIGHT
POLYCHROMASIA BLD QL SMEAR: ABNORMAL
RBC # BLD AUTO: 2.3 M/UL
TARGETS BLD QL SMEAR: ABNORMAL
WBC # BLD AUTO: 2.68 K/UL

## 2017-08-14 PROCEDURE — 99214 OFFICE O/P EST MOD 30 MIN: CPT | Mod: S$GLB,,, | Performed by: INTERNAL MEDICINE

## 2017-08-14 PROCEDURE — 86850 RBC ANTIBODY SCREEN: CPT

## 2017-08-14 PROCEDURE — 1159F MED LIST DOCD IN RCRD: CPT | Mod: S$GLB,,, | Performed by: INTERNAL MEDICINE

## 2017-08-14 PROCEDURE — 3077F SYST BP >= 140 MM HG: CPT | Mod: S$GLB,,, | Performed by: INTERNAL MEDICINE

## 2017-08-14 PROCEDURE — 36415 COLL VENOUS BLD VENIPUNCTURE: CPT

## 2017-08-14 PROCEDURE — 1126F AMNT PAIN NOTED NONE PRSNT: CPT | Mod: S$GLB,,, | Performed by: INTERNAL MEDICINE

## 2017-08-14 PROCEDURE — 3078F DIAST BP <80 MM HG: CPT | Mod: S$GLB,,, | Performed by: INTERNAL MEDICINE

## 2017-08-14 PROCEDURE — 85025 COMPLETE CBC W/AUTO DIFF WBC: CPT

## 2017-08-14 PROCEDURE — 86900 BLOOD TYPING SEROLOGIC ABO: CPT

## 2017-08-14 PROCEDURE — 82668 ASSAY OF ERYTHROPOIETIN: CPT

## 2017-08-14 PROCEDURE — 99999 PR PBB SHADOW E&M-EST. PATIENT-LVL III: CPT | Mod: PBBFAC,,, | Performed by: INTERNAL MEDICINE

## 2017-08-14 PROCEDURE — 3008F BODY MASS INDEX DOCD: CPT | Mod: S$GLB,,, | Performed by: INTERNAL MEDICINE

## 2017-08-14 RX ORDER — AZACITIDINE 100 MG/1
75 INJECTION, POWDER, LYOPHILIZED, FOR SOLUTION INTRAVENOUS; SUBCUTANEOUS
Status: CANCELLED | OUTPATIENT
Start: 2017-08-29

## 2017-08-14 RX ORDER — AZACITIDINE 100 MG/1
75 INJECTION, POWDER, LYOPHILIZED, FOR SOLUTION INTRAVENOUS; SUBCUTANEOUS
Status: CANCELLED | OUTPATIENT
Start: 2017-08-30

## 2017-08-14 RX ORDER — AZACITIDINE 100 MG/1
75 INJECTION, POWDER, LYOPHILIZED, FOR SOLUTION INTRAVENOUS; SUBCUTANEOUS
Status: CANCELLED | OUTPATIENT
Start: 2017-08-25

## 2017-08-14 RX ORDER — AZACITIDINE 100 MG/1
75 INJECTION, POWDER, LYOPHILIZED, FOR SOLUTION INTRAVENOUS; SUBCUTANEOUS
Status: CANCELLED | OUTPATIENT
Start: 2017-08-24

## 2017-08-14 RX ORDER — AZACITIDINE 100 MG/1
75 INJECTION, POWDER, LYOPHILIZED, FOR SOLUTION INTRAVENOUS; SUBCUTANEOUS
Status: CANCELLED | OUTPATIENT
Start: 2017-08-21

## 2017-08-14 RX ORDER — AZACITIDINE 100 MG/1
75 INJECTION, POWDER, LYOPHILIZED, FOR SOLUTION INTRAVENOUS; SUBCUTANEOUS
Status: CANCELLED | OUTPATIENT
Start: 2017-08-23

## 2017-08-14 RX ORDER — LEVOTHYROXINE SODIUM 25 UG/1
TABLET ORAL
Qty: 90 TABLET | Refills: 4 | Status: SHIPPED | OUTPATIENT
Start: 2017-08-14

## 2017-08-14 RX ORDER — AZACITIDINE 100 MG/1
75 INJECTION, POWDER, LYOPHILIZED, FOR SOLUTION INTRAVENOUS; SUBCUTANEOUS
Status: CANCELLED | OUTPATIENT
Start: 2017-08-26

## 2017-08-14 NOTE — Clinical Note
cbc, cmp, type and screen, MD appt and chair for 7 days (break for weekend)  of vidaza injection starting on 8/21/17; ok to overbook

## 2017-08-15 LAB
GENETICIST REVIEW: NORMAL
PLASMA CELL PROLIF RELEASED BY: NORMAL
PLASMA CELL PROLIF RESULT SUMMARY: NORMAL
PLASMA CELL PROLIF RESULT TABLE: NORMAL
REASON FOR REFERRAL, PLASMA CELL PROLIF (PCPD), FISH: NORMAL
REF LAB TEST METHOD: NORMAL
RESULTS, PLASMA CELL PROLIF (PCPD), FISH: NORMAL
SERVICE CMNT-IMP: NORMAL
SERVICE CMNT-IMP: NORMAL
SPECIMEN SOURCE: NORMAL
SPECIMEN, PLASMA CELL PROLIF (PCPD), FISH: NORMAL

## 2017-08-15 NOTE — PROGRESS NOTES
SECTION OF HEMATOLOGY AND BONE MARROW TRANSPLANT  Return Patient Visit   08/15/2017    CHIEF COMPLAINT: No chief complaint on file.      HISTORY OF PRESENT ILLNESS:   Shara Rose is a 82 y.o. female who is referred to me July 2017 for a Hematology consultation for further evaluation of anemia. Patient's past medical history includes Sickle Cell Trait, Hypothyroidism, Chronic Kidney Disease, and Normocytic Anemia who was previously seen by Dr. Flores in the clinic for evaluation of anemia in 2011 and 2014. Underwent extensive work-up including bone marrow evaluation in 2011 which showed 80% cellularity with erythroid hyperplasia and megaloblastoid maturation of erythroid precursors. Cytogenetics were unremarkable. Adequate storage iron with increased ring sideroblast, adequate number of megakaryocytes. Blasts were only 1.5%. Refractory anemia with ring sideroblasts was considered as differential. She also had Hb Electrophoresis performed which was compatible with the patient's history of Sickle Cell Trait. During that visit, she was noted to have elevated LEVON and was referred to Rheumatology and was lost to follow up. Over the course of last several years, her baseline Hb of 9-10 g/dL has gradually declined along with onset of severe thrombocytopenia since 2014. Last lab work in our records from 9/14/16 showed Hb: 8.1 g/dL and Plt count of 31,000/mm3. She states for past 6 months, she has been severely anemic, requiring blood transfusions every 2 months. Has received total of 6 units of blood already this year. Most recent being last week when she presented to the PCP at an outside clinic with complaint of weakness and was found to have severe anemia with Hb of 4 g/dL. She was admitted in [facility] and was transfused 2 units of blood and referred here for further evaluation. She denies any melena or hematuria. Last colonoscopy was in 2012, which was unremarkable. Denies any weight loss, or  fever/chills. However, she does report night sweats. Denies any lymphadenopathy or rash.  Since our last appt had bone marrow biopsy to assess worsening cytopenias.  Presents with friend to review results.  No change in clinical status since our last appt.   PAST MEDICAL HISTORY:   Past Medical History:   Diagnosis Date    Allergy     Anemia     Arthritis     Cataract     CKD (chronic kidney disease)     Gout, unspecified     Hypertension     Hypothyroidism     MDS (myelodysplastic syndrome) 8/14/2017    Menopause     Sickle cell trait     Trigger finger        PAST SURGICAL HISTORY:   Past Surgical History:   Procedure Laterality Date    APPENDECTOMY      HEMORRHOID SURGERY      HYSTERECTOMY         PAST SOCIAL HISTORY:   reports that she quit smoking about 44 years ago. Her smoking use included Cigarettes. She has a 0.25 pack-year smoking history. She has never used smokeless tobacco. She reports that she does not drink alcohol or use drugs.    FAMILY HISTORY:  Family History   Problem Relation Age of Onset    Hypertension Mother     Diabetes Neg Hx     Amblyopia Neg Hx     Blindness Neg Hx     Glaucoma Neg Hx     Macular degeneration Neg Hx     Retinal detachment Neg Hx     Strabismus Neg Hx     Peripheral vascular disease Father     Heart disease Father     Cataracts Father     Cancer Son        CURRENT MEDICATIONS:   Current Outpatient Prescriptions   Medication Sig    clotrimazole-betamethasone 1-0.05% (LOTRISONE) cream Apply topically 2 (two) times daily.    levothyroxine (SYNTHROID) 25 MCG tablet TAKE 1 TABLET EVERY DAY    valsartan (DIOVAN) 80 MG tablet TAKE 1 TABLET ONE TIME DAILY    verapamil (CALAN-SR) 240 MG CR tablet TAKE 1 TABLET EVERY DAY     No current facility-administered medications for this visit.      ALLERGIES:   Review of patient's allergies indicates:   Allergen Reactions    Sulfa (sulfonamide antibiotics) Itching and Rash           REVIEW OF SYSTEMS:   Review  "of Systems - General ROS: negative  Psychological ROS: negative  Ophthalmic ROS: negative  Allergy and Immunology ROS: negative  Breast ROS: negative  Respiratory ROS: negative  Cardiovascular ROS: negative  Gastrointestinal ROS: negative  Genito-Urinary ROS: negative  Musculoskeletal ROS: negative  Neurological ROS: negative  Dermatological ROS: negative    PHYSICAL EXAM:   Vitals:    08/14/17 1330   BP: (!) 205/78   Pulse: 64   Resp: 16   Temp: 98.2 °F (36.8 °C)   TempSrc: Oral   SpO2: 100%   Weight: 58.2 kg (128 lb 4.9 oz)   Height: 5' 5" (1.651 m)   PainSc: 0-No pain     General - well developed, well nourished, no apparent distress  HEENT - oropharynx clear  Chest and Lung - clear to auscultation bilaterally   Cardiovascular - RRR with no MGR, normal S1 and S2  Abdomen-  soft, nontender, no palpable hepatomegaly or splenomegaly  Lymph - no palpable lymphadenopathy  Heme - no bruising, petechiae, pallor  Skin - no rashes or lesions  Psych - appropriate mood and affect      ECOG Performance Status: (foot note - ECOG PS provided by Eastern Cooperative Oncology Group) 1 - Symptomatic but completely ambulatory    Karnofsky Performance Score:  80%- Normal Activity with Effort: Some Symptoms of Disease  DATA:   Lab Results   Component Value Date    WBC 2.68 (L) 08/14/2017    HGB 7.2 (L) 08/14/2017    HCT 21.7 (L) 08/14/2017    MCV 94 08/14/2017    PLT 19 (LL) 08/14/2017     Gran #   Date Value Ref Range Status   08/14/2017 0.9 (L) 1.8 - 7.7 K/uL Final     Gran%   Date Value Ref Range Status   08/14/2017 34.4 (L) 38.0 - 73.0 % Final     Lymph #   Date Value Ref Range Status   08/14/2017 1.4 1.0 - 4.8 K/uL Final     Lymph%   Date Value Ref Range Status   08/14/2017 51.1 (H) 18.0 - 48.0 % Final     CMP  Sodium   Date Value Ref Range Status   08/03/2017 142 136 - 145 mmol/L Final     Potassium   Date Value Ref Range Status   08/03/2017 3.8 3.5 - 5.1 mmol/L Final     Chloride   Date Value Ref Range Status   08/03/2017 109 " 95 - 110 mmol/L Final     CO2   Date Value Ref Range Status   08/03/2017 26 23 - 29 mmol/L Final     Glucose   Date Value Ref Range Status   08/03/2017 89 70 - 110 mg/dL Final     BUN, Bld   Date Value Ref Range Status   08/03/2017 17 8 - 23 mg/dL Final     Creatinine   Date Value Ref Range Status   08/03/2017 1.4 0.5 - 1.4 mg/dL Final     Calcium   Date Value Ref Range Status   08/03/2017 9.6 8.7 - 10.5 mg/dL Final     Total Protein   Date Value Ref Range Status   08/03/2017 8.5 (H) 6.0 - 8.4 g/dL Final     Albumin   Date Value Ref Range Status   08/03/2017 3.5 3.5 - 5.2 g/dL Final     Total Bilirubin   Date Value Ref Range Status   08/03/2017 0.9 0.1 - 1.0 mg/dL Final     Comment:     For infants and newborns, interpretation of results should be based  on gestational age, weight and in agreement with clinical  observations.  Premature Infant recommended reference ranges:  Up to 24 hours.............<8.0 mg/dL  Up to 48 hours............<12.0 mg/dL  3-5 days..................<15.0 mg/dL  6-29 days.................<15.0 mg/dL       Alkaline Phosphatase   Date Value Ref Range Status   08/03/2017 83 55 - 135 U/L Final     AST   Date Value Ref Range Status   08/03/2017 29 10 - 40 U/L Final     ALT   Date Value Ref Range Status   08/03/2017 19 10 - 44 U/L Final     Anion Gap   Date Value Ref Range Status   08/03/2017 7 (L) 8 - 16 mmol/L Final     eGFR if    Date Value Ref Range Status   08/03/2017 40.6 (A) >60 mL/min/1.73 m^2 Final     eGFR if non    Date Value Ref Range Status   08/03/2017 35.3 (A) >60 mL/min/1.73 m^2 Final     Comment:     Calculation used to obtain the estimated glomerular filtration  rate (eGFR) is the CKD-EPI equation. Since race is unknown   in our information system, the eGFR values for   -American and Non--American patients are given   for each creatinine result.       LD   Date Value Ref Range Status   08/03/2017 184 110 - 260 U/L Final     Comment:      Results are increased in hemolyzed samples.     Lab Results   Component Value Date    IRON 122 08/03/2017    TIBC 209 (L) 08/03/2017    FERRITIN 576 (H) 08/03/2017     Lab Results   Component Value Date    LBUJNKYJ41 362 08/03/2017     Lab Results   Component Value Date    FOLATE 35.2 (H) 08/03/2017   Pathologist Interpretation TAHIR   Pathologist Interpretation TAIHR   Collected: 08/03/17 1522   Resulting lab: [facility]   Value: REVIEWED   Comment: Electronically reviewed and signed by:   Rhianna Mao MD   Signed on 08/04/17 at 13:32   There is a very faint IgA kappa monoclonal band in beta. - SPEP negative     HEMOGLOBIN ELECTROPHORESIS,HGB A2 UGO.   Order: [id number]   Status:  Final result   Visible to patient:  No (Not Released) Next appt:  None Dx:  Anemia     Ref Range & Units 3yr ago 6yr ago    Hgb A2 Quant 1.5 - 3.8 % 2.4 2.2CM    Hemoglobin Bands   Hb A , Hb S and Hb A2 textCM    Hemoglobin Electrophoresis Interp   See comment    Comments: Hemoglobins A and S are present, measuring approximately 62% and 37%   respectively.  This pattern is compatible with the patient's history   of sickle cell trait, provided the patient has no recent history of   red cell transfusion.  Correlate clinically.   Interpreted by Rhianna Mao M.D.                  Bone Marrow biopsy - 8/7/17  SPECIMEN  1) Bone marrow clot, left iliac crest.  2) Bone marrow core biopsy, left iliac crest.  3) Bone Marrow Aspirate (Slides)  FINAL PATHOLOGIC DIAGNOSIS  LEFT ILIAC CREST BONE MARROW ASPIRATE SMEAR, CLOT SECTION, AND CORE BIOPSY:  -- HYPERCELLULAR MARROW FOR AGE (70%) WITH ERYTHROID HYPERPLASIA, RING SIDEROBLASTS  AND ATYPICAL MEGAKARYOCYTIC PROLIFERATION, HIGHLY SUGGESTIVE OF A MYELODYSPLASTIC  SYNDROME.  -- INCREASED IRON STORAGE.  -- SEE COMMENT.  COMMENT:  CBC data (8/3/2017): WBC 2.31 (granulocyte 22.9%, lymphocyte 55%, monocyte 5.2%, eosinophil 16.9%), RBC  2.66, HGB 8.5, HCT 24.7, MCV  93, MCHC 34.4, PLT 24.  Peripheral blood smear is not submitted for review.  Flow cytometric analysis of bone marrow shows populations of polyclonal B lymphocytes and T lymphocytes that  are immunophenotypically unremarkable. The blast gate is not increased.  Immunohistochemical stains are performed on the biopsy core for greater sensitivity and further architectural  assessment with adequate controls. CD34 highlights rare immature mononuclear cells. The early erythroid  precursors are positive for E-cadherin. CD61 highlights markedly increased megakaryocytes with frequent small  forms. The small lymphoid aggregates are composed of a mixture of CD3- positive T-cells and CD20-positive  B-cells.  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  Correlation with cytogenetics is critical. Close clinical follow-up is required.  Diagnosed by: Gio Prado  (Electronically Signed: 2017-08-10 15:02:16)  Microscopic Examination  BONE MARROW ASPIRATE DIFFERENTIAL:  Blasts----------------------------------------------1%  Promyelocytes---------------------------------0%  Myelocytes--------------------------------------3%  Metamyelocytes------------------------------ 5%  Bands----------------------------------------------4%  PMN Neutrophils------------------------------6%  Eosinophils------------------------------------------2%  Basophils---------------------------------------0%  Monocytes------------------------------------1%  Lymphocytes-------------------------------------31%  Plasma cells------------------------------------------1%  Erythroid precursors--------------------------47%  BONE MARROW ASPIRATE SMEAR:  The smears contain cellular spicules. The myeloid to erythroid ratio is markedly decreased, approximately 0.5:1.  Some erythroid precursors display  megaloblastoid change and a shift towards immaturity. Blasts are not increased.  Megakaryocytes are increased and display frequent atypical nuclear features including hypolobation and small forms  as well as occasional forms with  nuclei. Stainable iron is increased. Ring sideroblasts are seen,  approximately 30-35% of the erythroid precursors.  BONE MARROW CLOT SECTION:  The clot sections contain small spicules with cellular composition that is similar to the biopsy core. Stainable iron is  present.  BONE MARROW CORE BIOPSY:  Cortical and medullary bones are present. The cellularity is approximately 70%. The myeloid to erythroid ratio is  markedly decreased. Megakaryocytes are increased and form small clusters in areas. Small lymphoid aggregates  composed of small mature lymphocytes are seen. Stainable iron is present.  ASSESSMENT AND PLAN:   Encounter Diagnoses   Name Primary?    MDS (myelodysplastic syndrome)     Monoclonal gammopathy of undetermined significance Yes    Sickle cell trait      -newly diagnosed MDS with multilineage  dysplasia; awaiting cytogenetics and FISH to calculate IPSS and R-IPSS  -suspect anemia augmented buy underlying sickle trait   -no indication for transfusion today   -reviewed neutropenic and bleeding precautions with patient today   -regardless treatment is indicated due to her degree of cytopenias; if 5q minus plan for revlimid; if no 5q- plan to initiate vidaza   -erythropoietin level pending to assess for possible concomitant use of GASTON  -discuss initiation of prophylactic anti infectives at next appt  -discuss workup of MGUS at next appt    Follow Up: cbc, cmp, type and screen, MD appt and chair for 7 days (break for weekend)  of vidaza injection starting on 8/21/17  Ankit Wang MD  Hematology/Oncology/Bone Marrow Transplant

## 2017-08-16 LAB
CHROM BANDING METHOD: NORMAL
CHROMOSOME ANALYSIS BM ADDITIONAL INFORMATION: NORMAL
CHROMOSOME ANALYSIS BM RELEASED BY: NORMAL
CHROMOSOME ANALYSIS BM RESULT SUMMARY: NORMAL
CLINICAL CYTOGENETICIST REVIEW: NORMAL
CLINICAL CYTOGENETICIST REVIEW: NORMAL
ERYTHROPOIETIN: 484.4 MIU/ML
FMDS SPECIMEN: NORMAL
KARYOTYP MAR: NORMAL
MDS FISH ADDITIONAL INFORMATION: NORMAL
MDS FISH DISCLAIMER: NORMAL
MDS FISH REASON FOR REFERRAL (BM): NORMAL
MDS FISH RELEASED BY: NORMAL
MDS FISH RESULT (BM): NORMAL
MDS FISH RESULT SUMMARY: NORMAL
MDS FISH RESULT TABLE: NORMAL
REASON FOR REFERRAL (NARRATIVE): NORMAL
REF LAB TEST METHOD: NORMAL
REF LAB TEST METHOD: NORMAL
SPECIMEN SOURCE: NORMAL
SPECIMEN SOURCE: NORMAL
SPECIMEN: NORMAL

## 2017-08-17 ENCOUNTER — TELEPHONE (OUTPATIENT)
Dept: HEMATOLOGY/ONCOLOGY | Facility: CLINIC | Age: 82
End: 2017-08-17

## 2017-08-17 NOTE — TELEPHONE ENCOUNTER
----- Message from Marika Macdonald sent at 8/17/2017  1:25 PM CDT -----  Sri just sent an in basket message to the MD as follows:  I am attempting to get an approval from the patient's insurance co. for AZACITADINE and need clarification of the order. Your treatment plan have the patient scheduled to receive the medication on days 1 - 9 every 4 weeks. The insurance company wants to know if the patient will receive the medication on days 1 - 9 or days 1 - 7 every 4 weeks. Please notify me ASAP so that the reauthorization process can be resumed. Thanks.    ----- Message -----  From: Nurys Alvares RN  Sent: 8/17/2017   8:04 AM  To: Marika Macdonald, Misti Padilla, #        ----- Message -----  From: Juan Wang MD  Sent: 8/17/2017   6:32 AM  To: Nurys Alvares RN, Kasandra Canseco    Just confirming patients fu will be scheduled as below? She has high risk MDS and needs to start therapy as soon as possible   Follow Up: cbc, cmp, type and screen, MD appt and chair for 7 days (break for weekend)  of vidaza injection starting on 8/21/17  Ankit Wang MD  Hematology/Oncology/Bone Marrow Transplant

## 2017-08-22 ENCOUNTER — INFUSION (OUTPATIENT)
Dept: INFUSION THERAPY | Facility: HOSPITAL | Age: 82
End: 2017-08-22
Attending: INTERNAL MEDICINE
Payer: MEDICARE

## 2017-08-22 ENCOUNTER — TELEPHONE (OUTPATIENT)
Dept: HEMATOLOGY/ONCOLOGY | Facility: CLINIC | Age: 82
End: 2017-08-22

## 2017-08-22 ENCOUNTER — LAB VISIT (OUTPATIENT)
Dept: LAB | Facility: HOSPITAL | Age: 82
End: 2017-08-22
Attending: INTERNAL MEDICINE
Payer: MEDICARE

## 2017-08-22 ENCOUNTER — OFFICE VISIT (OUTPATIENT)
Dept: HEMATOLOGY/ONCOLOGY | Facility: CLINIC | Age: 82
End: 2017-08-22
Payer: MEDICARE

## 2017-08-22 VITALS
RESPIRATION RATE: 18 BRPM | WEIGHT: 127.44 LBS | TEMPERATURE: 98 F | SYSTOLIC BLOOD PRESSURE: 147 MMHG | BODY MASS INDEX: 21.23 KG/M2 | HEART RATE: 68 BPM | OXYGEN SATURATION: 99 % | DIASTOLIC BLOOD PRESSURE: 67 MMHG | HEIGHT: 65 IN

## 2017-08-22 DIAGNOSIS — R11.2 CINV (CHEMOTHERAPY-INDUCED NAUSEA AND VOMITING): Primary | ICD-10-CM

## 2017-08-22 DIAGNOSIS — D69.6 THROMBOCYTOPENIA: ICD-10-CM

## 2017-08-22 DIAGNOSIS — D47.2 MONOCLONAL GAMMOPATHY OF UNDETERMINED SIGNIFICANCE: ICD-10-CM

## 2017-08-22 DIAGNOSIS — D46.9 MDS (MYELODYSPLASTIC SYNDROME): Primary | ICD-10-CM

## 2017-08-22 DIAGNOSIS — D63.8 ANEMIA OF OTHER CHRONIC DISEASE: ICD-10-CM

## 2017-08-22 DIAGNOSIS — D46.9 MYELODYSPLASTIC SYNDROME: ICD-10-CM

## 2017-08-22 DIAGNOSIS — D46.9 MDS (MYELODYSPLASTIC SYNDROME): ICD-10-CM

## 2017-08-22 DIAGNOSIS — T45.1X5A CINV (CHEMOTHERAPY-INDUCED NAUSEA AND VOMITING): Primary | ICD-10-CM

## 2017-08-22 LAB
ABO + RH BLD: NORMAL
ALBUMIN SERPL BCP-MCNC: 3.5 G/DL
ALP SERPL-CCNC: 85 U/L
ALT SERPL W/O P-5'-P-CCNC: 11 U/L
ANION GAP SERPL CALC-SCNC: 7 MMOL/L
AST SERPL-CCNC: 22 U/L
BASOPHILS # BLD AUTO: 0.01 K/UL
BASOPHILS NFR BLD: 0.3 %
BILIRUB SERPL-MCNC: 1 MG/DL
BLD GP AB SCN CELLS X3 SERPL QL: NORMAL
BUN SERPL-MCNC: 18 MG/DL
CALCIUM SERPL-MCNC: 9.8 MG/DL
CHLORIDE SERPL-SCNC: 108 MMOL/L
CO2 SERPL-SCNC: 26 MMOL/L
CREAT SERPL-MCNC: 1.3 MG/DL
DIFFERENTIAL METHOD: ABNORMAL
EOSINOPHIL # BLD AUTO: 0.3 K/UL
EOSINOPHIL NFR BLD: 9.3 %
ERYTHROCYTE [DISTWIDTH] IN BLOOD BY AUTOMATED COUNT: 23.8 %
EST. GFR  (AFRICAN AMERICAN): 44.5 ML/MIN/1.73 M^2
EST. GFR  (NON AFRICAN AMERICAN): 38.6 ML/MIN/1.73 M^2
GLUCOSE SERPL-MCNC: 89 MG/DL
HCT VFR BLD AUTO: 20.2 %
HGB BLD-MCNC: 6.7 G/DL
LYMPHOCYTES # BLD AUTO: 2.2 K/UL
LYMPHOCYTES NFR BLD: 61.6 %
MCH RBC QN AUTO: 31 PG
MCHC RBC AUTO-ENTMCNC: 33.2 G/DL
MCV RBC AUTO: 94 FL
MONOCYTES # BLD AUTO: 0.1 K/UL
MONOCYTES NFR BLD: 2.5 %
NEUTROPHILS # BLD AUTO: 0.9 K/UL
NEUTROPHILS NFR BLD: 26.3 %
PLATELET # BLD AUTO: 17 K/UL
PMV BLD AUTO: ABNORMAL FL
POTASSIUM SERPL-SCNC: 4.1 MMOL/L
PROT SERPL-MCNC: 8.8 G/DL
RBC # BLD AUTO: 2.16 M/UL
SODIUM SERPL-SCNC: 141 MMOL/L
WBC # BLD AUTO: 3.54 K/UL

## 2017-08-22 PROCEDURE — 85025 COMPLETE CBC W/AUTO DIFF WBC: CPT

## 2017-08-22 PROCEDURE — 1126F AMNT PAIN NOTED NONE PRSNT: CPT | Mod: S$GLB,,, | Performed by: INTERNAL MEDICINE

## 2017-08-22 PROCEDURE — 86901 BLOOD TYPING SEROLOGIC RH(D): CPT

## 2017-08-22 PROCEDURE — 86900 BLOOD TYPING SEROLOGIC ABO: CPT

## 2017-08-22 PROCEDURE — 63600175 PHARM REV CODE 636 W HCPCS: Performed by: INTERNAL MEDICINE

## 2017-08-22 PROCEDURE — 99999 PR PBB SHADOW E&M-EST. PATIENT-LVL III: CPT | Mod: PBBFAC,,, | Performed by: INTERNAL MEDICINE

## 2017-08-22 PROCEDURE — 96401 CHEMO ANTI-NEOPL SQ/IM: CPT

## 2017-08-22 PROCEDURE — 80053 COMPREHEN METABOLIC PANEL: CPT

## 2017-08-22 PROCEDURE — 1159F MED LIST DOCD IN RCRD: CPT | Mod: S$GLB,,, | Performed by: INTERNAL MEDICINE

## 2017-08-22 PROCEDURE — 3077F SYST BP >= 140 MM HG: CPT | Mod: S$GLB,,, | Performed by: INTERNAL MEDICINE

## 2017-08-22 PROCEDURE — 3008F BODY MASS INDEX DOCD: CPT | Mod: S$GLB,,, | Performed by: INTERNAL MEDICINE

## 2017-08-22 PROCEDURE — 3078F DIAST BP <80 MM HG: CPT | Mod: S$GLB,,, | Performed by: INTERNAL MEDICINE

## 2017-08-22 PROCEDURE — 99214 OFFICE O/P EST MOD 30 MIN: CPT | Mod: S$GLB,,, | Performed by: INTERNAL MEDICINE

## 2017-08-22 RX ORDER — AZACITIDINE 100 MG/1
75 INJECTION, POWDER, LYOPHILIZED, FOR SOLUTION INTRAVENOUS; SUBCUTANEOUS
Status: COMPLETED | OUTPATIENT
Start: 2017-08-22 | End: 2017-08-22

## 2017-08-22 RX ORDER — ONDANSETRON HYDROCHLORIDE 8 MG/1
8 TABLET, FILM COATED ORAL EVERY 12 HOURS PRN
Qty: 30 TABLET | Refills: 2 | Status: SHIPPED | OUTPATIENT
Start: 2017-08-22 | End: 2017-11-07 | Stop reason: SDUPTHER

## 2017-08-22 RX ADMIN — AZACITIDINE 120 MG: 100 INJECTION, POWDER, LYOPHILIZED, FOR SOLUTION INTRAVENOUS; SUBCUTANEOUS at 04:08

## 2017-08-22 NOTE — Clinical Note
-cbc, cmp, type and screen,  On 8/28, 9/5, 9/11, 9/18; late morning lab appts  -MD appt and chair for 7 days of vidaza injections after labs on 9/18

## 2017-08-22 NOTE — PROGRESS NOTES
SECTION OF HEMATOLOGY AND BONE MARROW TRANSPLANT  Return Patient Visit   08/23/2017    CHIEF COMPLAINT:   Chief Complaint   Patient presents with    Anemia of other chronic disease    Monoclonal gammopathy of undetermined significance    Fatigue       HISTORY OF PRESENT ILLNESS:   Shara Rose is a 82 y.o. female who is referred to me July 2017 for a Hematology consultation for further evaluation of anemia. Patient's past medical history includes Sickle Cell Trait, Hypothyroidism, Chronic Kidney Disease, and Normocytic Anemia who was previously seen by Dr. Florse in the clinic for evaluation of anemia in 2011 and 2014. Underwent extensive work-up including bone marrow evaluation in 2011 which showed 80% cellularity with erythroid hyperplasia and megaloblastoid maturation of erythroid precursors. Cytogenetics were unremarkable. Adequate storage iron with increased ring sideroblast, adequate number of megakaryocytes. Blasts were only 1.5%. Refractory anemia with ring sideroblasts was considered as differential. She also had Hb Electrophoresis performed which was compatible with the patient's history of Sickle Cell Trait. During that visit, she was noted to have elevated LEVON and was referred to Rheumatology and was lost to follow up. Over the course of last several years, her baseline Hb of 9-10 g/dL has gradually declined along with onset of severe thrombocytopenia since 2014. Last lab work in our records from 9/14/16 showed Hb: 8.1 g/dL and Plt count of 31,000/mm3. She states for past 6 months, she has been severely anemic, requiring blood transfusions every 2 months. Has received total of 6 units of blood already this year. Most recent being last week when she presented to the PCP at an outside clinic with complaint of weakness and was found to have severe anemia with Hb of 4 g/dL. She was admitted in [facility] and was transfused 2 units of blood and referred here for further evaluation. She  denies any melena or hematuria. Last colonoscopy was in 2012, which was unremarkable. Denies any weight loss, or fever/chills. However, she does report night sweats. Denies any lymphadenopathy or rash.  Since our last appt had bone marrow biopsy to assess worsening cytopenias.  Presents with friend to review results.  No change in clinical status since our last appt.   PAST MEDICAL HISTORY:   Past Medical History:   Diagnosis Date    Allergy     Anemia     Arthritis     Cataract     CKD (chronic kidney disease)     Gout, unspecified     Hypertension     Hypothyroidism     MDS (myelodysplastic syndrome) 8/14/2017    Menopause     Sickle cell trait     Trigger finger        PAST SURGICAL HISTORY:   Past Surgical History:   Procedure Laterality Date    APPENDECTOMY      HEMORRHOID SURGERY      HYSTERECTOMY         PAST SOCIAL HISTORY:   reports that she quit smoking about 44 years ago. Her smoking use included Cigarettes. She has a 0.25 pack-year smoking history. She has never used smokeless tobacco. She reports that she does not drink alcohol or use drugs.    FAMILY HISTORY:  Family History   Problem Relation Age of Onset    Hypertension Mother     Diabetes Neg Hx     Amblyopia Neg Hx     Blindness Neg Hx     Glaucoma Neg Hx     Macular degeneration Neg Hx     Retinal detachment Neg Hx     Strabismus Neg Hx     Peripheral vascular disease Father     Heart disease Father     Cataracts Father     Cancer Son        CURRENT MEDICATIONS:   Current Outpatient Prescriptions   Medication Sig    clotrimazole-betamethasone 1-0.05% (LOTRISONE) cream Apply topically 2 (two) times daily.    levothyroxine (SYNTHROID) 25 MCG tablet TAKE 1 TABLET EVERY DAY    valsartan (DIOVAN) 80 MG tablet TAKE 1 TABLET ONE TIME DAILY    verapamil (CALAN-SR) 240 MG CR tablet TAKE 1 TABLET EVERY DAY    ondansetron (ZOFRAN) 8 MG tablet Take 1 tablet (8 mg total) by mouth every 12 (twelve) hours as needed for Nausea.  "    No current facility-administered medications for this visit.      ALLERGIES:   Review of patient's allergies indicates:   Allergen Reactions    Sulfa (sulfonamide antibiotics) Itching and Rash           REVIEW OF SYSTEMS:   Review of Systems - General ROS: negative  Psychological ROS: negative  Ophthalmic ROS: negative  Allergy and Immunology ROS: negative  Breast ROS: negative  Respiratory ROS: negative  Cardiovascular ROS: negative  Gastrointestinal ROS: negative  Genito-Urinary ROS: negative  Musculoskeletal ROS: negative  Neurological ROS: negative  Dermatological ROS: negative    PHYSICAL EXAM:   Vitals:    08/22/17 1449   BP: (!) 147/67   Pulse: 68   Resp: 18   Temp: 98.4 °F (36.9 °C)   TempSrc: Oral   SpO2: 99%   Weight: 57.8 kg (127 lb 6.8 oz)   Height: 5' 5" (1.651 m)   PainSc: 0-No pain     General - well developed, well nourished, no apparent distress  HEENT - oropharynx clear  Chest and Lung - clear to auscultation bilaterally   Cardiovascular - RRR with no MGR, normal S1 and S2  Abdomen-  soft, nontender, no palpable hepatomegaly or splenomegaly  Lymph - no palpable lymphadenopathy  Heme - no bruising, petechiae, pallor  Skin - no rashes or lesions  Psych - appropriate mood and affect      ECOG Performance Status: (foot note - ECOG PS provided by Eastern Cooperative Oncology Group) 1 - Symptomatic but completely ambulatory    Karnofsky Performance Score:  80%- Normal Activity with Effort: Some Symptoms of Disease  DATA:   Lab Results   Component Value Date    WBC 3.54 (L) 08/22/2017    HGB 6.7 (L) 08/22/2017    HCT 20.2 (L) 08/22/2017    MCV 94 08/22/2017    PLT 17 (LL) 08/22/2017     Gran #   Date Value Ref Range Status   08/22/2017 0.9 (L) 1.8 - 7.7 K/uL Final     Gran%   Date Value Ref Range Status   08/22/2017 26.3 (L) 38.0 - 73.0 % Final     Lymph #   Date Value Ref Range Status   08/22/2017 2.2 1.0 - 4.8 K/uL Final     Lymph%   Date Value Ref Range Status   08/22/2017 61.6 (H) 18.0 - 48.0 % " Final     CMP  Sodium   Date Value Ref Range Status   08/22/2017 141 136 - 145 mmol/L Final     Potassium   Date Value Ref Range Status   08/22/2017 4.1 3.5 - 5.1 mmol/L Final     Chloride   Date Value Ref Range Status   08/22/2017 108 95 - 110 mmol/L Final     CO2   Date Value Ref Range Status   08/22/2017 26 23 - 29 mmol/L Final     Glucose   Date Value Ref Range Status   08/22/2017 89 70 - 110 mg/dL Final     BUN, Bld   Date Value Ref Range Status   08/22/2017 18 8 - 23 mg/dL Final     Creatinine   Date Value Ref Range Status   08/22/2017 1.3 0.5 - 1.4 mg/dL Final     Calcium   Date Value Ref Range Status   08/22/2017 9.8 8.7 - 10.5 mg/dL Final     Total Protein   Date Value Ref Range Status   08/22/2017 8.8 (H) 6.0 - 8.4 g/dL Final     Albumin   Date Value Ref Range Status   08/22/2017 3.5 3.5 - 5.2 g/dL Final     Total Bilirubin   Date Value Ref Range Status   08/22/2017 1.0 0.1 - 1.0 mg/dL Final     Comment:     For infants and newborns, interpretation of results should be based  on gestational age, weight and in agreement with clinical  observations.  Premature Infant recommended reference ranges:  Up to 24 hours.............<8.0 mg/dL  Up to 48 hours............<12.0 mg/dL  3-5 days..................<15.0 mg/dL  6-29 days.................<15.0 mg/dL       Alkaline Phosphatase   Date Value Ref Range Status   08/22/2017 85 55 - 135 U/L Final     AST   Date Value Ref Range Status   08/22/2017 22 10 - 40 U/L Final     ALT   Date Value Ref Range Status   08/22/2017 11 10 - 44 U/L Final     Anion Gap   Date Value Ref Range Status   08/22/2017 7 (L) 8 - 16 mmol/L Final     eGFR if    Date Value Ref Range Status   08/22/2017 44.5 (A) >60 mL/min/1.73 m^2 Final     eGFR if non    Date Value Ref Range Status   08/22/2017 38.6 (A) >60 mL/min/1.73 m^2 Final     Comment:     Calculation used to obtain the estimated glomerular filtration  rate (eGFR) is the CKD-EPI equation. Since race is  unknown   in our information system, the eGFR values for   -American and Non--American patients are given   for each creatinine result.       LD   Date Value Ref Range Status   08/03/2017 184 110 - 260 U/L Final     Comment:     Results are increased in hemolyzed samples.     Lab Results   Component Value Date    IRON 122 08/03/2017    TIBC 209 (L) 08/03/2017    FERRITIN 576 (H) 08/03/2017     Lab Results   Component Value Date    QOXOHAJO13 362 08/03/2017     Lab Results   Component Value Date    FOLATE 35.2 (H) 08/03/2017   Pathologist Interpretation TAHIR   Pathologist Interpretation TAHIR   Collected: 08/03/17 1522   Resulting lab: [facility]   Value: REVIEWED   Comment: Electronically reviewed and signed by:   Rhianna Mao MD   Signed on 08/04/17 at 13:32   There is a very faint IgA kappa monoclonal band in beta. - SPEP negative     HEMOGLOBIN ELECTROPHORESIS,HGB A2 UGO.   Order: [id number]   Status:  Final result   Visible to patient:  No (Not Released) Next appt:  None Dx:  Anemia     Ref Range & Units 3yr ago 6yr ago    Hgb A2 Quant 1.5 - 3.8 % 2.4 2.2CM    Hemoglobin Bands   Hb A , Hb S and Hb A2 textCM    Hemoglobin Electrophoresis Interp   See comment    Comments: Hemoglobins A and S are present, measuring approximately 62% and 37%   respectively.  This pattern is compatible with the patient's history   of sickle cell trait, provided the patient has no recent history of   red cell transfusion.  Correlate clinically.   Interpreted by Rhianna Mao M.D.                  Bone Marrow biopsy - 8/7/17  SPECIMEN  1) Bone marrow clot, left iliac crest.  2) Bone marrow core biopsy, left iliac crest.  3) Bone Marrow Aspirate (Slides)  FINAL PATHOLOGIC DIAGNOSIS  LEFT ILIAC CREST BONE MARROW ASPIRATE SMEAR, CLOT SECTION, AND CORE BIOPSY:  -- HYPERCELLULAR MARROW FOR AGE (70%) WITH ERYTHROID HYPERPLASIA, RING SIDEROBLASTS  AND ATYPICAL MEGAKARYOCYTIC PROLIFERATION,  HIGHLY SUGGESTIVE OF A MYELODYSPLASTIC  SYNDROME.  -- INCREASED IRON STORAGE.  -- SEE COMMENT.  COMMENT:  CBC data (8/3/2017): WBC 2.31 (granulocyte 22.9%, lymphocyte 55%, monocyte 5.2%, eosinophil 16.9%), RBC  2.66, HGB 8.5, HCT 24.7, MCV 93, MCHC 34.4, PLT 24.  Peripheral blood smear is not submitted for review.  Flow cytometric analysis of bone marrow shows populations of polyclonal B lymphocytes and T lymphocytes that  are immunophenotypically unremarkable. The blast gate is not increased.  Immunohistochemical stains are performed on the biopsy core for greater sensitivity and further architectural  assessment with adequate controls. CD34 highlights rare immature mononuclear cells. The early erythroid  precursors are positive for E-cadherin. CD61 highlights markedly increased megakaryocytes with frequent small  forms. The small lymphoid aggregates are composed of a mixture of CD3- positive T-cells and CD20-positive  B-cells.  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  Correlation with cytogenetics is critical. Close clinical follow-up is required.  Diagnosed by: Gio Prado  (Electronically Signed: 2017-08-10 15:02:16)  Microscopic Examination  BONE MARROW ASPIRATE DIFFERENTIAL:  Blasts----------------------------------------------1%  Promyelocytes---------------------------------0%  Myelocytes--------------------------------------3%  Metamyelocytes------------------------------ 5%  Bands----------------------------------------------4%  PMN Neutrophils------------------------------6%  Eosinophils------------------------------------------2%  Basophils---------------------------------------0%  Monocytes------------------------------------1%  Lymphocytes-------------------------------------31%  Plasma  cells------------------------------------------1%  Erythroid precursors--------------------------47%  BONE MARROW ASPIRATE SMEAR:  The smears contain cellular spicules. The myeloid to erythroid ratio is markedly decreased, approximately 0.5:1.  Some erythroid precursors display megaloblastoid change and a shift towards immaturity. Blasts are not increased.  Megakaryocytes are increased and display frequent atypical nuclear features including hypolobation and small forms  as well as occasional forms with  nuclei. Stainable iron is increased. Ring sideroblasts are seen,  approximately 30-35% of the erythroid precursors.  BONE MARROW CLOT SECTION:  The clot sections contain small spicules with cellular composition that is similar to the biopsy core. Stainable iron is  present.  BONE MARROW CORE BIOPSY:  Cortical and medullary bones are present. The cellularity is approximately 70%. The myeloid to erythroid ratio is  markedly decreased. Megakaryocytes are increased and form small clusters in areas. Small lymphoid aggregates  composed of small mature lymphocytes are seen. Stainable iron is present.  ASSESSMENT AND PLAN:   Encounter Diagnoses   Name Primary?   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 ; Int 2 IPSS; Very high risk R-IPSS  -not a stem cell transplant candidate due to age  -discussed terminal nature of diagnosis and recommendations for hypomethylating agent therapy  -discussed risks of disease associated with bone marrow failure and transformation to AML  -can consider addition of GASTON if no response in anemia to vidaza   -pancytopenia due to MDS; suspect anemia augmented buy underlying sickle trait ;  transfuse for plt >10, hgb >7; transfuse 1 unit prbc tomorrow   -reviewed neutropenic and bleeding precautions with  patient  -initiate vidaza day 1-7 of 28 day cycle - D1C1 =8/22/17  -reviewed side effects of vidaza; written consents obtained  -prn zofran for home use for CINV  -plan for weekly labs and prn transfusions  -for MGUS; anemia likely due to MDS; no hypercalcemia, Cr at baseline; will discuss skeletal survey at next appt but lack of clonal plasma cells in bone marrow make underlying myeloma highly unlikely      Follow Up: -cbc, cmp, type and screen,  On 8/28, 9/5, 9/11, 9/18; late morning lab appts   -MD appt and chair for 7 days of vidaza injections after labs on 9/18    Ankit Wang MD  Hematology/Oncology/Bone Marrow Transplant

## 2017-08-23 ENCOUNTER — TELEPHONE (OUTPATIENT)
Dept: HEMATOLOGY/ONCOLOGY | Facility: CLINIC | Age: 82
End: 2017-08-23

## 2017-08-23 ENCOUNTER — INFUSION (OUTPATIENT)
Dept: INFUSION THERAPY | Facility: HOSPITAL | Age: 82
End: 2017-08-23
Attending: INTERNAL MEDICINE
Payer: MEDICARE

## 2017-08-23 VITALS
HEART RATE: 57 BPM | OXYGEN SATURATION: 100 % | DIASTOLIC BLOOD PRESSURE: 72 MMHG | SYSTOLIC BLOOD PRESSURE: 165 MMHG | RESPIRATION RATE: 20 BRPM | TEMPERATURE: 98 F

## 2017-08-23 DIAGNOSIS — D46.9 MDS (MYELODYSPLASTIC SYNDROME): ICD-10-CM

## 2017-08-23 DIAGNOSIS — D46.Z MDS (MYELODYSPLASTIC SYNDROME), HIGH GRADE: Primary | ICD-10-CM

## 2017-08-23 LAB
BLD PROD TYP BPU: NORMAL
BLOOD UNIT EXPIRATION DATE: NORMAL
BLOOD UNIT TYPE CODE: 5100
BLOOD UNIT TYPE: NORMAL
CODING SYSTEM: NORMAL
DISPENSE STATUS: NORMAL
NUM UNITS TRANS PACKED RBC: NORMAL

## 2017-08-23 PROCEDURE — 36430 TRANSFUSION BLD/BLD COMPNT: CPT

## 2017-08-23 PROCEDURE — 96375 TX/PRO/DX INJ NEW DRUG ADDON: CPT

## 2017-08-23 PROCEDURE — 96401 CHEMO ANTI-NEOPL SQ/IM: CPT

## 2017-08-23 PROCEDURE — P9038 RBC IRRADIATED: HCPCS

## 2017-08-23 PROCEDURE — 25000003 PHARM REV CODE 250: Performed by: INTERNAL MEDICINE

## 2017-08-23 PROCEDURE — 63600175 PHARM REV CODE 636 W HCPCS: Performed by: INTERNAL MEDICINE

## 2017-08-23 PROCEDURE — 86920 COMPATIBILITY TEST SPIN: CPT

## 2017-08-23 PROCEDURE — 27201040 HC RC 50 FILTER

## 2017-08-23 RX ORDER — AZACITIDINE 100 MG/1
75 INJECTION, POWDER, LYOPHILIZED, FOR SOLUTION INTRAVENOUS; SUBCUTANEOUS
Status: COMPLETED | OUTPATIENT
Start: 2017-08-23 | End: 2017-08-23

## 2017-08-23 RX ORDER — DIPHENHYDRAMINE HYDROCHLORIDE 50 MG/ML
25 INJECTION INTRAMUSCULAR; INTRAVENOUS
Status: COMPLETED | OUTPATIENT
Start: 2017-08-23 | End: 2017-08-23

## 2017-08-23 RX ORDER — HYDROCODONE BITARTRATE AND ACETAMINOPHEN 500; 5 MG/1; MG/1
TABLET ORAL ONCE
Status: COMPLETED | OUTPATIENT
Start: 2017-08-23 | End: 2017-08-23

## 2017-08-23 RX ORDER — ACETAMINOPHEN 325 MG/1
650 TABLET ORAL
Status: COMPLETED | OUTPATIENT
Start: 2017-08-23 | End: 2017-08-23

## 2017-08-23 RX ORDER — ACETAMINOPHEN 325 MG/1
650 TABLET ORAL
Status: CANCELLED | OUTPATIENT
Start: 2017-08-23

## 2017-08-23 RX ORDER — HYDROCODONE BITARTRATE AND ACETAMINOPHEN 500; 5 MG/1; MG/1
TABLET ORAL ONCE
Status: CANCELLED | OUTPATIENT
Start: 2017-08-23 | End: 2017-08-23

## 2017-08-23 RX ORDER — DIPHENHYDRAMINE HYDROCHLORIDE 50 MG/ML
25 INJECTION INTRAMUSCULAR; INTRAVENOUS
Status: CANCELLED | OUTPATIENT
Start: 2017-08-23

## 2017-08-23 RX ADMIN — AZACITIDINE 120 MG: 100 INJECTION, POWDER, LYOPHILIZED, FOR SOLUTION INTRAVENOUS; SUBCUTANEOUS at 03:08

## 2017-08-23 RX ADMIN — ACETAMINOPHEN 650 MG: 325 TABLET ORAL at 03:08

## 2017-08-23 RX ADMIN — SODIUM CHLORIDE: 0.9 INJECTION, SOLUTION INTRAVENOUS at 03:08

## 2017-08-23 RX ADMIN — DIPHENHYDRAMINE HYDROCHLORIDE 25 MG: 50 INJECTION INTRAMUSCULAR; INTRAVENOUS at 03:08

## 2017-08-23 NOTE — PLAN OF CARE
Problem: Patient Care Overview  Goal: Individualization & Mutuality  Outcome: Ongoing (interventions implemented as appropriate)  Patient present in clinic,no c/o pain or s/s distress. Pending blood transfusion and vidaza injection. Asked MD if wanted any platelets transfused,MD said no. Will continue to monitor.

## 2017-08-23 NOTE — TELEPHONE ENCOUNTER
Spoke with pt at 0830am on 08/23/17 and explained to pt that due to low blood levels Dr. Wang would like her to receive one unit of PRBC's today and that her appointment time has been changed from 1245pm to 3pm today. Also asked pt to come in a little earlier and stop on the 3rd floor to sign a consent to receive her blood transfusion, pt verbalized understanding.   Hina

## 2017-08-23 NOTE — PLAN OF CARE
Problem: Patient Care Overview  Goal: Plan of Care Review  Outcome: Ongoing (interventions implemented as appropriate)  Patient tolerated vidaza injection and one unit of PRBC's well;no s/s reaction. Patient given AVS,instructed to call MD with any problems.

## 2017-08-24 ENCOUNTER — INFUSION (OUTPATIENT)
Dept: INFUSION THERAPY | Facility: HOSPITAL | Age: 82
End: 2017-08-24
Attending: INTERNAL MEDICINE
Payer: MEDICARE

## 2017-08-24 VITALS
RESPIRATION RATE: 18 BRPM | SYSTOLIC BLOOD PRESSURE: 156 MMHG | TEMPERATURE: 98 F | HEART RATE: 63 BPM | DIASTOLIC BLOOD PRESSURE: 67 MMHG

## 2017-08-24 DIAGNOSIS — D46.9 MDS (MYELODYSPLASTIC SYNDROME): Primary | ICD-10-CM

## 2017-08-24 PROCEDURE — 96401 CHEMO ANTI-NEOPL SQ/IM: CPT

## 2017-08-24 PROCEDURE — 86644 CMV ANTIBODY: CPT

## 2017-08-24 PROCEDURE — 63600175 PHARM REV CODE 636 W HCPCS: Performed by: INTERNAL MEDICINE

## 2017-08-24 RX ORDER — AZACITIDINE 100 MG/1
75 INJECTION, POWDER, LYOPHILIZED, FOR SOLUTION INTRAVENOUS; SUBCUTANEOUS
Status: COMPLETED | OUTPATIENT
Start: 2017-08-24 | End: 2017-08-24

## 2017-08-24 RX ADMIN — AZACITIDINE 120 MG: 100 INJECTION, POWDER, LYOPHILIZED, FOR SOLUTION INTRAVENOUS; SUBCUTANEOUS at 12:08

## 2017-08-24 NOTE — NURSING
Pt arrived vidaza D3.  Pt tolerated injection SQ x3 to right side of abd.  Discharged to home with friend.

## 2017-08-25 ENCOUNTER — INFUSION (OUTPATIENT)
Dept: INFUSION THERAPY | Facility: HOSPITAL | Age: 82
End: 2017-08-25
Attending: INTERNAL MEDICINE
Payer: MEDICARE

## 2017-08-25 VITALS
DIASTOLIC BLOOD PRESSURE: 65 MMHG | HEART RATE: 65 BPM | TEMPERATURE: 98 F | SYSTOLIC BLOOD PRESSURE: 155 MMHG | RESPIRATION RATE: 18 BRPM

## 2017-08-25 DIAGNOSIS — D46.9 MDS (MYELODYSPLASTIC SYNDROME): Primary | ICD-10-CM

## 2017-08-25 PROCEDURE — 96401 CHEMO ANTI-NEOPL SQ/IM: CPT

## 2017-08-25 PROCEDURE — 63600175 PHARM REV CODE 636 W HCPCS: Performed by: INTERNAL MEDICINE

## 2017-08-25 RX ORDER — AZACITIDINE 100 MG/1
75 INJECTION, POWDER, LYOPHILIZED, FOR SOLUTION INTRAVENOUS; SUBCUTANEOUS
Status: COMPLETED | OUTPATIENT
Start: 2017-08-25 | End: 2017-08-25

## 2017-08-25 RX ADMIN — AZACITIDINE 120 MG: 100 INJECTION, POWDER, LYOPHILIZED, FOR SOLUTION INTRAVENOUS; SUBCUTANEOUS at 11:08

## 2017-08-25 NOTE — NURSING
Pt arrived for Vidaza D4.  Pt tolerated injection SQ x3 to left side of abd.  Discharged to home with friend and will RTC on Monday.

## 2017-08-28 ENCOUNTER — INFUSION (OUTPATIENT)
Dept: INFUSION THERAPY | Facility: HOSPITAL | Age: 82
End: 2017-08-28
Attending: INTERNAL MEDICINE
Payer: MEDICARE

## 2017-08-28 VITALS — SYSTOLIC BLOOD PRESSURE: 159 MMHG | RESPIRATION RATE: 18 BRPM | HEART RATE: 66 BPM | DIASTOLIC BLOOD PRESSURE: 68 MMHG

## 2017-08-28 VITALS
SYSTOLIC BLOOD PRESSURE: 176 MMHG | RESPIRATION RATE: 18 BRPM | DIASTOLIC BLOOD PRESSURE: 72 MMHG | HEART RATE: 71 BPM | TEMPERATURE: 98 F

## 2017-08-28 DIAGNOSIS — D46.9 MDS (MYELODYSPLASTIC SYNDROME): Primary | ICD-10-CM

## 2017-08-28 DIAGNOSIS — Z51.89 ENCOUNTER FOR BLOOD TRANSFUSION: ICD-10-CM

## 2017-08-28 DIAGNOSIS — D69.6 THROMBOCYTOPENIA: Primary | ICD-10-CM

## 2017-08-28 LAB
BLD PROD TYP BPU: NORMAL
BLOOD UNIT EXPIRATION DATE: NORMAL
BLOOD UNIT TYPE CODE: 5100
BLOOD UNIT TYPE: NORMAL
CODING SYSTEM: NORMAL
DISPENSE STATUS: NORMAL
NUM UNITS TRANS WBC-POOR PLATPHERESIS: NORMAL

## 2017-08-28 PROCEDURE — 25000003 PHARM REV CODE 250: Performed by: INTERNAL MEDICINE

## 2017-08-28 PROCEDURE — P9037 PLATE PHERES LEUKOREDU IRRAD: HCPCS

## 2017-08-28 PROCEDURE — 63600175 PHARM REV CODE 636 W HCPCS: Performed by: INTERNAL MEDICINE

## 2017-08-28 PROCEDURE — 96374 THER/PROPH/DIAG INJ IV PUSH: CPT

## 2017-08-28 PROCEDURE — 96401 CHEMO ANTI-NEOPL SQ/IM: CPT

## 2017-08-28 PROCEDURE — 36430 TRANSFUSION BLD/BLD COMPNT: CPT

## 2017-08-28 RX ORDER — ACETAMINOPHEN 325 MG/1
650 TABLET ORAL
Status: COMPLETED | OUTPATIENT
Start: 2017-08-28 | End: 2017-08-28

## 2017-08-28 RX ORDER — DIPHENHYDRAMINE HYDROCHLORIDE 50 MG/ML
25 INJECTION INTRAMUSCULAR; INTRAVENOUS
Status: COMPLETED | OUTPATIENT
Start: 2017-08-28 | End: 2017-08-28

## 2017-08-28 RX ORDER — AZACITIDINE 100 MG/1
75 INJECTION, POWDER, LYOPHILIZED, FOR SOLUTION INTRAVENOUS; SUBCUTANEOUS
Status: COMPLETED | OUTPATIENT
Start: 2017-08-28 | End: 2017-08-28

## 2017-08-28 RX ADMIN — SODIUM CHLORIDE: 9 INJECTION, SOLUTION INTRAVENOUS at 02:08

## 2017-08-28 RX ADMIN — ACETAMINOPHEN 650 MG: 325 TABLET ORAL at 02:08

## 2017-08-28 RX ADMIN — DIPHENHYDRAMINE HYDROCHLORIDE 25 MG: 50 INJECTION INTRAMUSCULAR; INTRAVENOUS at 02:08

## 2017-08-28 RX ADMIN — AZACITIDINE 120 MG: 100 INJECTION, POWDER, LYOPHILIZED, FOR SOLUTION INTRAVENOUS; SUBCUTANEOUS at 11:08

## 2017-08-28 NOTE — NURSING
Vidaza injection given.  TALIA Bustamante notified of patient's platelets.  Awaiting response from Dr Wang.  Patient instructed to wait in waiting room, verblaized understanding.    temporal arteritis  11:50  TIEN Wiley RN    Per TALIA Bustamante, Dr Wang will order platelets for today.  Charge nurse notified.  Patient updated on plan of care, verbalized understanding.  08/28/2017  12:20  TIEN Wiley RN

## 2017-08-28 NOTE — PLAN OF CARE
Problem: Patient Care Overview  Goal: Plan of Care Review  Outcome: Ongoing (interventions implemented as appropriate)  Pt received 1U plt with no complications. Pt instructed to call MD with any problems. AVS given to patient and patient discharged home.

## 2017-08-29 ENCOUNTER — INFUSION (OUTPATIENT)
Dept: INFUSION THERAPY | Facility: HOSPITAL | Age: 82
End: 2017-08-29
Attending: INTERNAL MEDICINE
Payer: MEDICARE

## 2017-08-29 VITALS
RESPIRATION RATE: 18 BRPM | SYSTOLIC BLOOD PRESSURE: 140 MMHG | TEMPERATURE: 98 F | DIASTOLIC BLOOD PRESSURE: 79 MMHG | HEART RATE: 67 BPM

## 2017-08-29 DIAGNOSIS — D46.9 MDS (MYELODYSPLASTIC SYNDROME): Primary | ICD-10-CM

## 2017-08-29 PROCEDURE — 96401 CHEMO ANTI-NEOPL SQ/IM: CPT

## 2017-08-29 PROCEDURE — 63600175 PHARM REV CODE 636 W HCPCS: Performed by: INTERNAL MEDICINE

## 2017-08-29 RX ORDER — AZACITIDINE 100 MG/1
75 INJECTION, POWDER, LYOPHILIZED, FOR SOLUTION INTRAVENOUS; SUBCUTANEOUS
Status: COMPLETED | OUTPATIENT
Start: 2017-08-29 | End: 2017-08-29

## 2017-08-29 RX ADMIN — AZACITIDINE 120 MG: 100 INJECTION, POWDER, LYOPHILIZED, FOR SOLUTION INTRAVENOUS; SUBCUTANEOUS at 10:08

## 2017-08-29 NOTE — NURSING
Pt arrived for vidaza #6.  Pt tolerated injections x3 SQ to left side of abd.  Discharged to home with friend.

## 2017-08-30 ENCOUNTER — INFUSION (OUTPATIENT)
Dept: INFUSION THERAPY | Facility: HOSPITAL | Age: 82
End: 2017-08-30
Attending: INTERNAL MEDICINE
Payer: MEDICARE

## 2017-08-30 VITALS — DIASTOLIC BLOOD PRESSURE: 70 MMHG | SYSTOLIC BLOOD PRESSURE: 168 MMHG | HEART RATE: 68 BPM

## 2017-08-30 DIAGNOSIS — D46.9 MDS (MYELODYSPLASTIC SYNDROME): Primary | ICD-10-CM

## 2017-08-30 PROCEDURE — 63600175 PHARM REV CODE 636 W HCPCS: Performed by: INTERNAL MEDICINE

## 2017-08-30 PROCEDURE — 96401 CHEMO ANTI-NEOPL SQ/IM: CPT

## 2017-08-30 RX ORDER — AZACITIDINE 100 MG/1
75 INJECTION, POWDER, LYOPHILIZED, FOR SOLUTION INTRAVENOUS; SUBCUTANEOUS
Status: COMPLETED | OUTPATIENT
Start: 2017-08-30 | End: 2017-08-30

## 2017-08-30 RX ADMIN — AZACITIDINE 120 MG: 100 INJECTION, POWDER, LYOPHILIZED, FOR SOLUTION INTRAVENOUS; SUBCUTANEOUS at 11:08

## 2017-09-05 ENCOUNTER — LAB VISIT (OUTPATIENT)
Dept: LAB | Facility: HOSPITAL | Age: 82
End: 2017-09-05
Payer: MEDICARE

## 2017-09-05 ENCOUNTER — INFUSION (OUTPATIENT)
Dept: INFUSION THERAPY | Facility: HOSPITAL | Age: 82
End: 2017-09-05
Payer: MEDICARE

## 2017-09-05 ENCOUNTER — TELEPHONE (OUTPATIENT)
Dept: HEMATOLOGY/ONCOLOGY | Facility: CLINIC | Age: 82
End: 2017-09-05

## 2017-09-05 VITALS
DIASTOLIC BLOOD PRESSURE: 74 MMHG | HEART RATE: 65 BPM | RESPIRATION RATE: 16 BRPM | TEMPERATURE: 99 F | SYSTOLIC BLOOD PRESSURE: 163 MMHG

## 2017-09-05 DIAGNOSIS — D47.2 MONOCLONAL GAMMOPATHY OF UNDETERMINED SIGNIFICANCE: ICD-10-CM

## 2017-09-05 DIAGNOSIS — D69.6 THROMBOCYTOPENIA: ICD-10-CM

## 2017-09-05 DIAGNOSIS — D64.9 ANEMIA: Primary | ICD-10-CM

## 2017-09-05 DIAGNOSIS — D69.6 THROMBOCYTOPENIA: Primary | ICD-10-CM

## 2017-09-05 DIAGNOSIS — D46.9 MYELODYSPLASTIC SYNDROME: ICD-10-CM

## 2017-09-05 LAB
ABO + RH BLD: NORMAL
ALBUMIN SERPL BCP-MCNC: 3.2 G/DL
ALP SERPL-CCNC: 82 U/L
ALT SERPL W/O P-5'-P-CCNC: 10 U/L
ANION GAP SERPL CALC-SCNC: 2 MMOL/L
ANISOCYTOSIS BLD QL SMEAR: SLIGHT
AST SERPL-CCNC: 21 U/L
BASOPHILS # BLD AUTO: 0 K/UL
BASOPHILS NFR BLD: 0 %
BILIRUB SERPL-MCNC: 0.6 MG/DL
BLD GP AB SCN CELLS X3 SERPL QL: NORMAL
BLD PROD TYP BPU: NORMAL
BLD PROD TYP BPU: NORMAL
BLOOD UNIT EXPIRATION DATE: NORMAL
BLOOD UNIT EXPIRATION DATE: NORMAL
BLOOD UNIT TYPE CODE: 5100
BLOOD UNIT TYPE CODE: 5100
BLOOD UNIT TYPE: NORMAL
BLOOD UNIT TYPE: NORMAL
BUN SERPL-MCNC: 14 MG/DL
CALCIUM SERPL-MCNC: 9.4 MG/DL
CHLORIDE SERPL-SCNC: 108 MMOL/L
CO2 SERPL-SCNC: 29 MMOL/L
CODING SYSTEM: NORMAL
CODING SYSTEM: NORMAL
CREAT SERPL-MCNC: 1.3 MG/DL
DIFFERENTIAL METHOD: ABNORMAL
DISPENSE STATUS: NORMAL
DISPENSE STATUS: NORMAL
EOSINOPHIL # BLD AUTO: 0.2 K/UL
EOSINOPHIL NFR BLD: 7.2 %
ERYTHROCYTE [DISTWIDTH] IN BLOOD BY AUTOMATED COUNT: 19.9 %
EST. GFR  (AFRICAN AMERICAN): 44.5 ML/MIN/1.73 M^2
EST. GFR  (NON AFRICAN AMERICAN): 38.6 ML/MIN/1.73 M^2
GLUCOSE SERPL-MCNC: 90 MG/DL
HCT VFR BLD AUTO: 21.2 %
HGB BLD-MCNC: 7 G/DL
HYPOCHROMIA BLD QL SMEAR: ABNORMAL
LYMPHOCYTES # BLD AUTO: 1.4 K/UL
LYMPHOCYTES NFR BLD: 66 %
MCH RBC QN AUTO: 30.4 PG
MCHC RBC AUTO-ENTMCNC: 33 G/DL
MCV RBC AUTO: 92 FL
MONOCYTES # BLD AUTO: 0 K/UL
MONOCYTES NFR BLD: 1.4 %
NEUTROPHILS # BLD AUTO: 0.5 K/UL
NEUTROPHILS NFR BLD: 25.4 %
NUM UNITS TRANS PACKED RBC: NORMAL
NUM UNITS TRANS WBC-POOR PLATPHERESIS: NORMAL
OVALOCYTES BLD QL SMEAR: ABNORMAL
PLATELET # BLD AUTO: 9 K/UL
PLATELET BLD QL SMEAR: ABNORMAL
PMV BLD AUTO: ABNORMAL FL
POIKILOCYTOSIS BLD QL SMEAR: SLIGHT
POTASSIUM SERPL-SCNC: 3.9 MMOL/L
PROT SERPL-MCNC: 8.2 G/DL
RBC # BLD AUTO: 2.3 M/UL
SODIUM SERPL-SCNC: 139 MMOL/L
WBC # BLD AUTO: 2.09 K/UL

## 2017-09-05 PROCEDURE — 86901 BLOOD TYPING SEROLOGIC RH(D): CPT

## 2017-09-05 PROCEDURE — 27201040 HC RC 50 FILTER

## 2017-09-05 PROCEDURE — 86900 BLOOD TYPING SEROLOGIC ABO: CPT

## 2017-09-05 PROCEDURE — 80053 COMPREHEN METABOLIC PANEL: CPT

## 2017-09-05 PROCEDURE — 36430 TRANSFUSION BLD/BLD COMPNT: CPT

## 2017-09-05 PROCEDURE — P9038 RBC IRRADIATED: HCPCS

## 2017-09-05 PROCEDURE — P9037 PLATE PHERES LEUKOREDU IRRAD: HCPCS

## 2017-09-05 PROCEDURE — 86920 COMPATIBILITY TEST SPIN: CPT

## 2017-09-05 PROCEDURE — 25000003 PHARM REV CODE 250: Performed by: INTERNAL MEDICINE

## 2017-09-05 PROCEDURE — 85025 COMPLETE CBC W/AUTO DIFF WBC: CPT

## 2017-09-05 RX ORDER — HYDROCODONE BITARTRATE AND ACETAMINOPHEN 500; 5 MG/1; MG/1
TABLET ORAL ONCE
Status: CANCELLED | OUTPATIENT
Start: 2017-09-05 | End: 2017-09-05

## 2017-09-05 RX ORDER — HYDROCODONE BITARTRATE AND ACETAMINOPHEN 500; 5 MG/1; MG/1
TABLET ORAL ONCE
Status: COMPLETED | OUTPATIENT
Start: 2017-09-05 | End: 2017-09-05

## 2017-09-05 RX ORDER — HYDROCODONE BITARTRATE AND ACETAMINOPHEN 500; 5 MG/1; MG/1
TABLET ORAL ONCE
Status: DISCONTINUED | OUTPATIENT
Start: 2017-09-05 | End: 2017-09-05 | Stop reason: HOSPADM

## 2017-09-05 RX ORDER — DIPHENHYDRAMINE HCL 25 MG
25 CAPSULE ORAL
Status: CANCELLED | OUTPATIENT
Start: 2017-09-05

## 2017-09-05 RX ORDER — ACETAMINOPHEN 325 MG/1
650 TABLET ORAL
Status: COMPLETED | OUTPATIENT
Start: 2017-09-05 | End: 2017-09-05

## 2017-09-05 RX ORDER — ACETAMINOPHEN 325 MG/1
650 TABLET ORAL
Status: CANCELLED | OUTPATIENT
Start: 2017-09-05

## 2017-09-05 RX ORDER — DIPHENHYDRAMINE HCL 25 MG
25 CAPSULE ORAL
Status: COMPLETED | OUTPATIENT
Start: 2017-09-05 | End: 2017-09-05

## 2017-09-05 RX ADMIN — SODIUM CHLORIDE: 0.9 INJECTION, SOLUTION INTRAVENOUS at 02:09

## 2017-09-05 RX ADMIN — ACETAMINOPHEN 650 MG: 325 TABLET ORAL at 02:09

## 2017-09-05 RX ADMIN — DIPHENHYDRAMINE HYDROCHLORIDE 25 MG: 25 CAPSULE ORAL at 02:09

## 2017-09-05 NOTE — PLAN OF CARE
Problem: Patient Care Overview  Goal: Plan of Care Review  Outcome: Ongoing (interventions implemented as appropriate)  Pt arrived to infusion center for 1 unit platelets and 1 unit PRBCs. Pt c/o weakness to legs but denies SOB. PIV started to left forearm. Pt's VSS. Blankets and snacks offered. Will monitor.

## 2017-09-05 NOTE — PLAN OF CARE
Problem: Patient Care Overview  Goal: Plan of Care Review  Outcome: Ongoing (interventions implemented as appropriate)  Pt tolerated 1 unit platelets and 1 unit PRBC well without complication. PIV removed. AVS printed and given to patient. VSS; NAD. Discharging with friend at side.

## 2017-09-06 PROCEDURE — 86644 CMV ANTIBODY: CPT

## 2017-09-11 ENCOUNTER — TELEPHONE (OUTPATIENT)
Dept: HEMATOLOGY/ONCOLOGY | Facility: CLINIC | Age: 82
End: 2017-09-11

## 2017-09-18 ENCOUNTER — TELEPHONE (OUTPATIENT)
Dept: HEMATOLOGY/ONCOLOGY | Facility: CLINIC | Age: 82
End: 2017-09-18

## 2017-09-18 ENCOUNTER — OFFICE VISIT (OUTPATIENT)
Dept: HEMATOLOGY/ONCOLOGY | Facility: CLINIC | Age: 82
End: 2017-09-18
Payer: MEDICARE

## 2017-09-18 ENCOUNTER — INFUSION (OUTPATIENT)
Dept: INFUSION THERAPY | Facility: HOSPITAL | Age: 82
End: 2017-09-18
Attending: INTERNAL MEDICINE
Payer: MEDICARE

## 2017-09-18 VITALS
HEART RATE: 55 BPM | TEMPERATURE: 98 F | SYSTOLIC BLOOD PRESSURE: 142 MMHG | WEIGHT: 128.75 LBS | HEIGHT: 65 IN | DIASTOLIC BLOOD PRESSURE: 78 MMHG | BODY MASS INDEX: 21.45 KG/M2

## 2017-09-18 VITALS — SYSTOLIC BLOOD PRESSURE: 185 MMHG | RESPIRATION RATE: 18 BRPM | HEART RATE: 79 BPM | DIASTOLIC BLOOD PRESSURE: 79 MMHG

## 2017-09-18 DIAGNOSIS — D46.9 MDS (MYELODYSPLASTIC SYNDROME): Primary | ICD-10-CM

## 2017-09-18 PROCEDURE — 63600175 PHARM REV CODE 636 W HCPCS: Performed by: INTERNAL MEDICINE

## 2017-09-18 PROCEDURE — 99215 OFFICE O/P EST HI 40 MIN: CPT | Mod: S$GLB,,, | Performed by: INTERNAL MEDICINE

## 2017-09-18 PROCEDURE — 96401 CHEMO ANTI-NEOPL SQ/IM: CPT

## 2017-09-18 PROCEDURE — 3077F SYST BP >= 140 MM HG: CPT | Mod: S$GLB,,, | Performed by: INTERNAL MEDICINE

## 2017-09-18 PROCEDURE — 3078F DIAST BP <80 MM HG: CPT | Mod: S$GLB,,, | Performed by: INTERNAL MEDICINE

## 2017-09-18 PROCEDURE — 1159F MED LIST DOCD IN RCRD: CPT | Mod: S$GLB,,, | Performed by: INTERNAL MEDICINE

## 2017-09-18 PROCEDURE — 3008F BODY MASS INDEX DOCD: CPT | Mod: S$GLB,,, | Performed by: INTERNAL MEDICINE

## 2017-09-18 PROCEDURE — 1126F AMNT PAIN NOTED NONE PRSNT: CPT | Mod: S$GLB,,, | Performed by: INTERNAL MEDICINE

## 2017-09-18 PROCEDURE — 99499 UNLISTED E&M SERVICE: CPT | Mod: S$GLB,,, | Performed by: INTERNAL MEDICINE

## 2017-09-18 PROCEDURE — 99999 PR PBB SHADOW E&M-EST. PATIENT-LVL III: CPT | Mod: PBBFAC,,, | Performed by: INTERNAL MEDICINE

## 2017-09-18 RX ORDER — AZACITIDINE 100 MG/1
75 INJECTION, POWDER, LYOPHILIZED, FOR SOLUTION INTRAVENOUS; SUBCUTANEOUS
Status: CANCELLED | OUTPATIENT
Start: 2017-09-22

## 2017-09-18 RX ORDER — AZACITIDINE 100 MG/1
75 INJECTION, POWDER, LYOPHILIZED, FOR SOLUTION INTRAVENOUS; SUBCUTANEOUS
Status: CANCELLED | OUTPATIENT
Start: 2017-09-21

## 2017-09-18 RX ORDER — AZACITIDINE 100 MG/1
75 INJECTION, POWDER, LYOPHILIZED, FOR SOLUTION INTRAVENOUS; SUBCUTANEOUS
Status: CANCELLED | OUTPATIENT
Start: 2017-09-26

## 2017-09-18 RX ORDER — AZACITIDINE 100 MG/1
75 INJECTION, POWDER, LYOPHILIZED, FOR SOLUTION INTRAVENOUS; SUBCUTANEOUS
Status: CANCELLED | OUTPATIENT
Start: 2017-09-20

## 2017-09-18 RX ORDER — AZACITIDINE 100 MG/1
75 INJECTION, POWDER, LYOPHILIZED, FOR SOLUTION INTRAVENOUS; SUBCUTANEOUS
Status: COMPLETED | OUTPATIENT
Start: 2017-09-18 | End: 2017-09-18

## 2017-09-18 RX ORDER — AZACITIDINE 100 MG/1
75 INJECTION, POWDER, LYOPHILIZED, FOR SOLUTION INTRAVENOUS; SUBCUTANEOUS
Status: CANCELLED | OUTPATIENT
Start: 2017-09-19

## 2017-09-18 RX ORDER — AZACITIDINE 100 MG/1
75 INJECTION, POWDER, LYOPHILIZED, FOR SOLUTION INTRAVENOUS; SUBCUTANEOUS
Status: CANCELLED | OUTPATIENT
Start: 2017-09-18

## 2017-09-18 RX ORDER — AZACITIDINE 100 MG/1
75 INJECTION, POWDER, LYOPHILIZED, FOR SOLUTION INTRAVENOUS; SUBCUTANEOUS
Status: CANCELLED | OUTPATIENT
Start: 2017-09-25

## 2017-09-18 RX ADMIN — AZACITIDINE 120 MG: 100 INJECTION, POWDER, LYOPHILIZED, FOR SOLUTION INTRAVENOUS; SUBCUTANEOUS at 03:09

## 2017-09-18 NOTE — Clinical Note
Follow Up: -cbc, cmp, type and screen,  On 9/25, 10/2, 10/9, 10/16; late morning lab appts  -Cjsio appt and chair for 7 days of vidaza injections after labs on 10/16

## 2017-09-19 ENCOUNTER — INFUSION (OUTPATIENT)
Dept: INFUSION THERAPY | Facility: HOSPITAL | Age: 82
End: 2017-09-19
Attending: INTERNAL MEDICINE
Payer: MEDICARE

## 2017-09-19 VITALS
SYSTOLIC BLOOD PRESSURE: 179 MMHG | RESPIRATION RATE: 18 BRPM | TEMPERATURE: 99 F | HEART RATE: 61 BPM | DIASTOLIC BLOOD PRESSURE: 74 MMHG

## 2017-09-19 DIAGNOSIS — D46.9 MDS (MYELODYSPLASTIC SYNDROME): Primary | ICD-10-CM

## 2017-09-19 PROCEDURE — 96401 CHEMO ANTI-NEOPL SQ/IM: CPT

## 2017-09-19 PROCEDURE — 63600175 PHARM REV CODE 636 W HCPCS: Performed by: INTERNAL MEDICINE

## 2017-09-19 RX ORDER — AZACITIDINE 100 MG/1
75 INJECTION, POWDER, LYOPHILIZED, FOR SOLUTION INTRAVENOUS; SUBCUTANEOUS
Status: COMPLETED | OUTPATIENT
Start: 2017-09-19 | End: 2017-09-19

## 2017-09-19 RX ADMIN — AZACITIDINE 120 MG: 100 INJECTION, POWDER, LYOPHILIZED, FOR SOLUTION INTRAVENOUS; SUBCUTANEOUS at 11:09

## 2017-09-19 NOTE — NURSING
Pt arrived for vidaza D2.  Pt tolerated injection x3 SQ to right abd.  Discharged to home with friend.

## 2017-09-20 ENCOUNTER — INFUSION (OUTPATIENT)
Dept: INFUSION THERAPY | Facility: HOSPITAL | Age: 82
End: 2017-09-20
Attending: INTERNAL MEDICINE
Payer: MEDICARE

## 2017-09-20 VITALS — HEART RATE: 70 BPM | SYSTOLIC BLOOD PRESSURE: 157 MMHG | DIASTOLIC BLOOD PRESSURE: 67 MMHG

## 2017-09-20 DIAGNOSIS — D46.9 MDS (MYELODYSPLASTIC SYNDROME): Primary | ICD-10-CM

## 2017-09-20 PROCEDURE — 96401 CHEMO ANTI-NEOPL SQ/IM: CPT

## 2017-09-20 PROCEDURE — 63600175 PHARM REV CODE 636 W HCPCS: Mod: JW | Performed by: INTERNAL MEDICINE

## 2017-09-20 RX ORDER — AZACITIDINE 100 MG/1
75 INJECTION, POWDER, LYOPHILIZED, FOR SOLUTION INTRAVENOUS; SUBCUTANEOUS
Status: COMPLETED | OUTPATIENT
Start: 2017-09-20 | End: 2017-09-20

## 2017-09-20 RX ADMIN — AZACITIDINE 120 MG: 100 INJECTION, POWDER, LYOPHILIZED, FOR SOLUTION INTRAVENOUS; SUBCUTANEOUS at 11:09

## 2017-09-20 NOTE — NURSING
Patient here for vidaza injection X 3-complains of weakness,states not eating well-encouraged to increase fluids,adding boost or similar drinks-tolerated injections well.

## 2017-09-20 NOTE — PROGRESS NOTES
SECTION OF HEMATOLOGY AND BONE MARROW TRANSPLANT  Return Patient Visit   09/20/2017    CHIEF COMPLAINT:   Chief Complaint   Patient presents with    myelodysplastic syndrome       HISTORY OF PRESENT ILLNESS:   Shara Rose is a 82 y.o. female who is referred to me July 2017 for a Hematology consultation for further evaluation of anemia. Patient's past medical history includes Sickle Cell Trait, Hypothyroidism, Chronic Kidney Disease, and Normocytic Anemia who was previously seen by Dr. Flores in the clinic for evaluation of anemia in 2011 and 2014. Underwent extensive work-up including bone marrow evaluation in 2011 which showed 80% cellularity with erythroid hyperplasia and megaloblastoid maturation of erythroid precursors. Cytogenetics were unremarkable. Adequate storage iron with increased ring sideroblast, adequate number of megakaryocytes. Blasts were only 1.5%. Refractory anemia with ring sideroblasts was considered as differential. She also had Hb Electrophoresis performed which was compatible with the patient's history of Sickle Cell Trait. During that visit, she was noted to have elevated LEVON and was referred to Rheumatology and was lost to follow up. Over the course of last several years, her baseline Hb of 9-10 g/dL has gradually declined along with onset of severe thrombocytopenia since 2014. Last lab work in our records from 9/14/16 showed Hb: 8.1 g/dL and Plt count of 31,000/mm3. She states for past 6 months, she has been severely anemic, requiring blood transfusions every 2 months. Has received total of 6 units of blood already this year. Most recent being last week when she presented to the PCP at an outside clinic with complaint of weakness and was found to have severe anemia with Hb of 4 g/dL. She was admitted in [facility] and was transfused 2 units of blood and referred here for further evaluation. She denied any melena or hematuria. Last colonoscopy was in 2012, which was  unremarkable. Denies any weight loss, or fever/chills. However, she does report night sweats. Denies any lymphadenopathy or rash.  Since our last appt had bone marrow biopsy to assess worsening cytopenias.  Presents with friend to review results.  No change in clinical status since our last appt.     Follow-up 9/18/17  Return visit prior to cycle 2 Vidaza HMA therapy for MDS.  No adverse side effects with cycle 1. Minimal skin irritation with injections.   Cytopenias are stable. No symptomatic anemia. No bleeding events. Afebrile.  Arrives to visit with daughter.            PAST MEDICAL HISTORY:   Past Medical History:   Diagnosis Date    Allergy     Anemia     Arthritis     Cataract     CKD (chronic kidney disease)     Gout, unspecified     Hypertension     Hypothyroidism     MDS (myelodysplastic syndrome) 8/14/2017    Menopause     Sickle cell trait     Trigger finger        PAST SURGICAL HISTORY:   Past Surgical History:   Procedure Laterality Date    APPENDECTOMY      HEMORRHOID SURGERY      HYSTERECTOMY         PAST SOCIAL HISTORY:   reports that she quit smoking about 44 years ago. Her smoking use included Cigarettes. She has a 0.25 pack-year smoking history. She has never used smokeless tobacco. She reports that she does not drink alcohol or use drugs.    FAMILY HISTORY:  Family History   Problem Relation Age of Onset    Hypertension Mother     Peripheral vascular disease Father     Heart disease Father     Cataracts Father     Cancer Son     Diabetes Neg Hx     Amblyopia Neg Hx     Blindness Neg Hx     Glaucoma Neg Hx     Macular degeneration Neg Hx     Retinal detachment Neg Hx     Strabismus Neg Hx        CURRENT MEDICATIONS:   Current Outpatient Prescriptions   Medication Sig    AZACITIDINE (VIDAZA INJ) Inject as directed.    clotrimazole-betamethasone 1-0.05% (LOTRISONE) cream Apply topically 2 (two) times daily.    levothyroxine (SYNTHROID) 25 MCG tablet TAKE 1 TABLET EVERY  "DAY    ondansetron (ZOFRAN) 8 MG tablet Take 1 tablet (8 mg total) by mouth every 12 (twelve) hours as needed for Nausea.    valsartan (DIOVAN) 80 MG tablet TAKE 1 TABLET ONE TIME DAILY    verapamil (CALAN-SR) 240 MG CR tablet TAKE 1 TABLET EVERY DAY     No current facility-administered medications for this visit.      ALLERGIES:   Review of patient's allergies indicates:   Allergen Reactions    Sulfa (sulfonamide antibiotics) Itching and Rash           REVIEW OF SYSTEMS:   Review of Systems - General ROS: negative  Psychological ROS: negative  Ophthalmic ROS: negative  Allergy and Immunology ROS: negative  Breast ROS: negative  Respiratory ROS: negative  Cardiovascular ROS: negative  Gastrointestinal ROS: negative  Genito-Urinary ROS: negative  Musculoskeletal ROS: negative  Neurological ROS: negative  Dermatological ROS: negative    PHYSICAL EXAM:   Vitals:    09/18/17 1358 09/18/17 1432   BP: (!) 190/80 (!) 142/78   Pulse: (!) 55    Temp: 98.2 °F (36.8 °C)    Weight: 58.4 kg (128 lb 12 oz)    Height: 5' 5" (1.651 m)    PainSc: 0-No pain      General - well developed, well nourished, no apparent distress  HEENT - oropharynx clear  Chest and Lung - clear to auscultation bilaterally   Cardiovascular - RRR with no MGR, normal S1 and S2  Abdomen-  soft, nontender, no palpable hepatomegaly or splenomegaly  Lymph - no palpable lymphadenopathy  Heme - no bruising, petechiae, pallor  Skin - no rashes or lesions  Psych - appropriate mood and affect      ECOG Performance Status: (foot note - ECOG PS provided by Eastern Cooperative Oncology Group) 1 - Symptomatic but completely ambulatory    Karnofsky Performance Score:  80%- Normal Activity with Effort: Some Symptoms of Disease  DATA:   Lab Results   Component Value Date    WBC 2.24 (L) 09/18/2017    HGB 7.2 (L) 09/18/2017    HCT 20.9 (L) 09/18/2017    MCV 86 09/18/2017    PLT 11 (LL) 09/18/2017     Gran #   Date Value Ref Range Status   09/18/2017 0.5 (L) 1.8 - 7.7 " K/uL Final     Gran%   Date Value Ref Range Status   09/18/2017 23.8 (L) 38.0 - 73.0 % Final     Lymph #   Date Value Ref Range Status   09/18/2017 1.3 1.0 - 4.8 K/uL Final     Lymph%   Date Value Ref Range Status   09/18/2017 58.0 (H) 18.0 - 48.0 % Final     CMP  Sodium   Date Value Ref Range Status   09/18/2017 143 136 - 145 mmol/L Final     Potassium   Date Value Ref Range Status   09/18/2017 3.6 3.5 - 5.1 mmol/L Final     Chloride   Date Value Ref Range Status   09/18/2017 111 (H) 95 - 110 mmol/L Final     CO2   Date Value Ref Range Status   09/18/2017 27 23 - 29 mmol/L Final     Glucose   Date Value Ref Range Status   09/18/2017 75 70 - 110 mg/dL Final     BUN, Bld   Date Value Ref Range Status   09/18/2017 14 8 - 23 mg/dL Final     Creatinine   Date Value Ref Range Status   09/18/2017 1.2 0.5 - 1.4 mg/dL Final     Calcium   Date Value Ref Range Status   09/18/2017 9.2 8.7 - 10.5 mg/dL Final     Total Protein   Date Value Ref Range Status   09/18/2017 8.1 6.0 - 8.4 g/dL Final     Albumin   Date Value Ref Range Status   09/18/2017 3.1 (L) 3.5 - 5.2 g/dL Final     Total Bilirubin   Date Value Ref Range Status   09/18/2017 0.8 0.1 - 1.0 mg/dL Final     Comment:     For infants and newborns, interpretation of results should be based  on gestational age, weight and in agreement with clinical  observations.  Premature Infant recommended reference ranges:  Up to 24 hours.............<8.0 mg/dL  Up to 48 hours............<12.0 mg/dL  3-5 days..................<15.0 mg/dL  6-29 days.................<15.0 mg/dL       Alkaline Phosphatase   Date Value Ref Range Status   09/18/2017 94 55 - 135 U/L Final     AST   Date Value Ref Range Status   09/18/2017 18 10 - 40 U/L Final     ALT   Date Value Ref Range Status   09/18/2017 8 (L) 10 - 44 U/L Final     Anion Gap   Date Value Ref Range Status   09/18/2017 5 (L) 8 - 16 mmol/L Final     eGFR if    Date Value Ref Range Status   09/18/2017 49.0 (A) >60  mL/min/1.73 m^2 Final     eGFR if non    Date Value Ref Range Status   09/18/2017 42.5 (A) >60 mL/min/1.73 m^2 Final     Comment:     Calculation used to obtain the estimated glomerular filtration  rate (eGFR) is the CKD-EPI equation. Since race is unknown   in our information system, the eGFR values for   -American and Non--American patients are given   for each creatinine result.       LD   Date Value Ref Range Status   08/03/2017 184 110 - 260 U/L Final     Comment:     Results are increased in hemolyzed samples.     Lab Results   Component Value Date    IRON 122 08/03/2017    TIBC 209 (L) 08/03/2017    FERRITIN 576 (H) 08/03/2017     Lab Results   Component Value Date    [id number] 362 08/03/2017     Lab Results   Component Value Date    FOLATE 35.2 (H) 08/03/2017   Pathologist Interpretation TAHIR   Pathologist Interpretation TAHIR   Collected: 08/03/17 1522   Resulting lab: [facility]   Value: REVIEWED   Comment: Electronically reviewed and signed by:   Rhianna Mao MD   Signed on 08/04/17 at 13:32   There is a very faint IgA kappa monoclonal band in beta. - SPEP negative     HEMOGLOBIN ELECTROPHORESIS,HGB A2 UGO.   Order: [id number]   Status:  Final result   Visible to patient:  No (Not Released) Next appt:  None Dx:  Anemia     Ref Range & Units 3yr ago 6yr ago    Hgb A2 Quant 1.5 - 3.8 % 2.4 2.2CM    Hemoglobin Bands   Hb A , Hb S and Hb A2 textCM    Hemoglobin Electrophoresis Interp   See comment    Comments: Hemoglobins A and S are present, measuring approximately 62% and 37%   respectively.  This pattern is compatible with the patient's history   of sickle cell trait, provided the patient has no recent history of   red cell transfusion.  Correlate clinically.   Interpreted by Rhianna Mao M.D.                  Bone Marrow biopsy - 8/7/17  SPECIMEN  1) Bone marrow clot, left iliac crest.  2) Bone marrow core biopsy, left iliac crest.  3)  Bone Marrow Aspirate (Slides)  FINAL PATHOLOGIC DIAGNOSIS  LEFT ILIAC CREST BONE MARROW ASPIRATE SMEAR, CLOT SECTION, AND CORE BIOPSY:  -- HYPERCELLULAR MARROW FOR AGE (70%) WITH ERYTHROID HYPERPLASIA, RING SIDEROBLASTS  AND ATYPICAL MEGAKARYOCYTIC PROLIFERATION, HIGHLY SUGGESTIVE OF A MYELODYSPLASTIC  SYNDROME.  -- INCREASED IRON STORAGE.  -- SEE COMMENT.  COMMENT:  CBC data (8/3/2017): WBC 2.31 (granulocyte 22.9%, lymphocyte 55%, monocyte 5.2%, eosinophil 16.9%), RBC  2.66, HGB 8.5, HCT 24.7, MCV 93, MCHC 34.4, PLT 24.  Peripheral blood smear is not submitted for review.  Flow cytometric analysis of bone marrow shows populations of polyclonal B lymphocytes and T lymphocytes that  are immunophenotypically unremarkable. The blast gate is not increased.  Immunohistochemical stains are performed on the biopsy core for greater sensitivity and further architectural  assessment with adequate controls. CD34 highlights rare immature mononuclear cells. The early erythroid  precursors are positive for E-cadherin. CD61 highlights markedly increased megakaryocytes with frequent small  forms. The small lymphoid aggregates are composed of a mixture of CD3- positive T-cells and CD20-positive  B-cells.  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  Correlation with cytogenetics is critical. Close clinical follow-up is required.  Diagnosed by: Gio Prado  (Electronically Signed: 2017-08-10 15:02:16)  Microscopic Examination  BONE MARROW ASPIRATE DIFFERENTIAL:  Blasts----------------------------------------------1%  Promyelocytes---------------------------------0%  Myelocytes--------------------------------------3%  Metamyelocytes------------------------------ 5%  Bands----------------------------------------------4%  PMN  Neutrophils------------------------------6%  Eosinophils------------------------------------------2%  Basophils---------------------------------------0%  Monocytes------------------------------------1%  Lymphocytes-------------------------------------31%  Plasma cells------------------------------------------1%  Erythroid precursors--------------------------47%  BONE MARROW ASPIRATE SMEAR:  The smears contain cellular spicules. The myeloid to erythroid ratio is markedly decreased, approximately 0.5:1.  Some erythroid precursors display megaloblastoid change and a shift towards immaturity. Blasts are not increased.  Megakaryocytes are increased and display frequent atypical nuclear features including hypolobation and small forms  as well as occasional forms with  nuclei. Stainable iron is increased. Ring sideroblasts are seen,  approximately 30-35% of the erythroid precursors.  BONE MARROW CLOT SECTION:  The clot sections contain small spicules with cellular composition that is similar to the biopsy core. Stainable iron is  present.  BONE MARROW CORE BIOPSY:  Cortical and medullary bones are present. The cellularity is approximately 70%. The myeloid to erythroid ratio is  markedly decreased. Megakaryocytes are increased and form small clusters in areas. Small lymphoid aggregates  composed of small mature lymphocytes are seen. Stainable iron is present.  ASSESSMENT AND PLAN:   No diagnosis found.  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 ; Int 2 IPSS; Very high risk R-IPSS  -not a stem cell transplant candidate due to age  -discussed terminal nature of diagnosis and recommendations for hypomethylating agent therapy  -discussed risks of disease associated with bone marrow failure and transformation to AML  -can consider addition of GASTON if no response in anemia to vidaza   -pancytopenia due to MDS; suspect anemia augmented buy underlying sickle trait ;  transfuse for plt >10,  hgb >7; transfuse 1 unit prbc tomorrow   -reviewed neutropenic and bleeding precautions with patient  -initiate vidaza day 1-7 of 28 day cycle - D1C1 =8/22/17  -reviewed side effects of vidaza; written consents obtained  -prn zofran for home use for CINV  -plan for weekly labs and prn transfusions  -for MGUS; anemia likely due to MDS; no hypercalcemia, Cr at baseline; will discuss skeletal survey at next appt but lack of clonal plasma cells in bone marrow make underlying myeloma highly unlikely      Follow Up: -cbc, cmp, type and screen,  On 9/25, 10/2, 10/9, 10/16; late morning lab appts   -[employer] appt and chair for 7 days of vidaza injections after labs on 10/16

## 2017-09-21 ENCOUNTER — INFUSION (OUTPATIENT)
Dept: INFUSION THERAPY | Facility: HOSPITAL | Age: 82
End: 2017-09-21
Attending: INTERNAL MEDICINE
Payer: MEDICARE

## 2017-09-21 VITALS
HEART RATE: 65 BPM | SYSTOLIC BLOOD PRESSURE: 156 MMHG | TEMPERATURE: 98 F | RESPIRATION RATE: 18 BRPM | DIASTOLIC BLOOD PRESSURE: 66 MMHG

## 2017-09-21 DIAGNOSIS — D46.9 MDS (MYELODYSPLASTIC SYNDROME): Primary | ICD-10-CM

## 2017-09-21 PROCEDURE — 96401 CHEMO ANTI-NEOPL SQ/IM: CPT

## 2017-09-21 PROCEDURE — 63600175 PHARM REV CODE 636 W HCPCS: Performed by: INTERNAL MEDICINE

## 2017-09-21 RX ORDER — AZACITIDINE 100 MG/1
75 INJECTION, POWDER, LYOPHILIZED, FOR SOLUTION INTRAVENOUS; SUBCUTANEOUS
Status: COMPLETED | OUTPATIENT
Start: 2017-09-21 | End: 2017-09-21

## 2017-09-21 RX ADMIN — AZACITIDINE 120 MG: 100 INJECTION, POWDER, LYOPHILIZED, FOR SOLUTION INTRAVENOUS; SUBCUTANEOUS at 11:09

## 2017-09-21 NOTE — NURSING
1145:  Pt arrived for vidaza D4.  Pt tolerated injections X3 SQ to abd.  Discharged to home with family and appt calendar.

## 2017-09-22 ENCOUNTER — INFUSION (OUTPATIENT)
Dept: INFUSION THERAPY | Facility: HOSPITAL | Age: 82
End: 2017-09-22
Attending: INTERNAL MEDICINE
Payer: MEDICARE

## 2017-09-22 VITALS
SYSTOLIC BLOOD PRESSURE: 158 MMHG | HEART RATE: 67 BPM | RESPIRATION RATE: 18 BRPM | TEMPERATURE: 98 F | DIASTOLIC BLOOD PRESSURE: 67 MMHG

## 2017-09-22 DIAGNOSIS — D46.9 MDS (MYELODYSPLASTIC SYNDROME): Primary | ICD-10-CM

## 2017-09-22 PROCEDURE — 63600175 PHARM REV CODE 636 W HCPCS: Mod: JW | Performed by: INTERNAL MEDICINE

## 2017-09-22 PROCEDURE — 96401 CHEMO ANTI-NEOPL SQ/IM: CPT

## 2017-09-22 RX ORDER — AZACITIDINE 100 MG/1
75 INJECTION, POWDER, LYOPHILIZED, FOR SOLUTION INTRAVENOUS; SUBCUTANEOUS
Status: COMPLETED | OUTPATIENT
Start: 2017-09-22 | End: 2017-09-22

## 2017-09-22 RX ADMIN — AZACITIDINE 120 MG: 100 INJECTION, POWDER, LYOPHILIZED, FOR SOLUTION INTRAVENOUS; SUBCUTANEOUS at 02:09

## 2017-09-22 NOTE — NURSING
Pt arrived for vidaza D5.  Pt tolerated injections x3 SQ to left side of abd.  Discharged to home with friend and will RTC on Monday.

## 2017-09-25 ENCOUNTER — TELEPHONE (OUTPATIENT)
Dept: HEMATOLOGY/ONCOLOGY | Facility: CLINIC | Age: 82
End: 2017-09-25

## 2017-09-25 ENCOUNTER — INFUSION (OUTPATIENT)
Dept: INFUSION THERAPY | Facility: HOSPITAL | Age: 82
End: 2017-09-25
Payer: MEDICARE

## 2017-09-25 ENCOUNTER — INFUSION (OUTPATIENT)
Dept: INFUSION THERAPY | Facility: HOSPITAL | Age: 82
End: 2017-09-25
Attending: INTERNAL MEDICINE
Payer: MEDICARE

## 2017-09-25 VITALS — HEART RATE: 67 BPM | DIASTOLIC BLOOD PRESSURE: 70 MMHG | SYSTOLIC BLOOD PRESSURE: 179 MMHG | RESPIRATION RATE: 18 BRPM

## 2017-09-25 VITALS
RESPIRATION RATE: 18 BRPM | DIASTOLIC BLOOD PRESSURE: 67 MMHG | HEART RATE: 61 BPM | HEIGHT: 65 IN | SYSTOLIC BLOOD PRESSURE: 152 MMHG | BODY MASS INDEX: 21.45 KG/M2 | WEIGHT: 128.75 LBS | TEMPERATURE: 99 F

## 2017-09-25 DIAGNOSIS — D46.9 MDS (MYELODYSPLASTIC SYNDROME): Primary | ICD-10-CM

## 2017-09-25 DIAGNOSIS — D46.9 MDS (MYELODYSPLASTIC SYNDROME): ICD-10-CM

## 2017-09-25 PROCEDURE — 27201040 HC RC 50 FILTER

## 2017-09-25 PROCEDURE — 25000003 PHARM REV CODE 250: Performed by: INTERNAL MEDICINE

## 2017-09-25 PROCEDURE — 36430 TRANSFUSION BLD/BLD COMPNT: CPT

## 2017-09-25 PROCEDURE — 96374 THER/PROPH/DIAG INJ IV PUSH: CPT

## 2017-09-25 PROCEDURE — 96375 TX/PRO/DX INJ NEW DRUG ADDON: CPT

## 2017-09-25 PROCEDURE — 96401 CHEMO ANTI-NEOPL SQ/IM: CPT

## 2017-09-25 PROCEDURE — 63600175 PHARM REV CODE 636 W HCPCS: Performed by: INTERNAL MEDICINE

## 2017-09-25 PROCEDURE — P9038 RBC IRRADIATED: HCPCS

## 2017-09-25 PROCEDURE — 86920 COMPATIBILITY TEST SPIN: CPT

## 2017-09-25 RX ORDER — HYDROCODONE BITARTRATE AND ACETAMINOPHEN 500; 5 MG/1; MG/1
TABLET ORAL ONCE
Status: COMPLETED | OUTPATIENT
Start: 2017-09-25 | End: 2017-09-25

## 2017-09-25 RX ORDER — DIPHENHYDRAMINE HYDROCHLORIDE 50 MG/ML
25 INJECTION INTRAMUSCULAR; INTRAVENOUS
Status: COMPLETED | OUTPATIENT
Start: 2017-09-25 | End: 2017-09-25

## 2017-09-25 RX ORDER — DIPHENHYDRAMINE HYDROCHLORIDE 50 MG/ML
25 INJECTION INTRAMUSCULAR; INTRAVENOUS
Status: CANCELLED | OUTPATIENT
Start: 2017-09-25

## 2017-09-25 RX ORDER — ACETAMINOPHEN 325 MG/1
650 TABLET ORAL
Status: CANCELLED | OUTPATIENT
Start: 2017-09-25

## 2017-09-25 RX ORDER — AZACITIDINE 100 MG/1
75 INJECTION, POWDER, LYOPHILIZED, FOR SOLUTION INTRAVENOUS; SUBCUTANEOUS
Status: COMPLETED | OUTPATIENT
Start: 2017-09-25 | End: 2017-09-25

## 2017-09-25 RX ORDER — HYDROCODONE BITARTRATE AND ACETAMINOPHEN 500; 5 MG/1; MG/1
TABLET ORAL ONCE
Status: CANCELLED | OUTPATIENT
Start: 2017-09-25 | End: 2017-09-25

## 2017-09-25 RX ORDER — ACETAMINOPHEN 325 MG/1
650 TABLET ORAL
Status: COMPLETED | OUTPATIENT
Start: 2017-09-25 | End: 2017-09-25

## 2017-09-25 RX ADMIN — AZACITIDINE 120 MG: 100 INJECTION, POWDER, LYOPHILIZED, FOR SOLUTION INTRAVENOUS; SUBCUTANEOUS at 02:09

## 2017-09-25 RX ADMIN — ACETAMINOPHEN 650 MG: 325 TABLET ORAL at 03:09

## 2017-09-25 RX ADMIN — DIPHENHYDRAMINE HYDROCHLORIDE 25 MG: 50 INJECTION INTRAMUSCULAR; INTRAVENOUS at 03:09

## 2017-09-25 RX ADMIN — SODIUM CHLORIDE: 0.9 INJECTION, SOLUTION INTRAVENOUS at 03:09

## 2017-09-25 NOTE — NURSING
Labs called to [facility] Bustamante Rn.  Per Dr Wang pt to receive blood and vidaza injections today.  Patient updated on plan of care, verbalized understanding.

## 2017-09-25 NOTE — PLAN OF CARE
Problem: Patient Care Overview  Goal: Plan of Care Review  Outcome: Outcome(s) achieved Date Met: 09/25/17  Pt arrived for 1 unit PRBCs (9/25/17) transfusion. #22g PIV X 1 attempt to LFA, + blood return noted, flushes easily. Pt tolerated transfusion very well. PIV D/C'd w/o difficulty. Pt D/C'd home with family & instructions

## 2017-09-26 ENCOUNTER — INFUSION (OUTPATIENT)
Dept: INFUSION THERAPY | Facility: HOSPITAL | Age: 82
End: 2017-09-26
Attending: INTERNAL MEDICINE
Payer: MEDICARE

## 2017-09-26 VITALS — DIASTOLIC BLOOD PRESSURE: 61 MMHG | HEART RATE: 77 BPM | SYSTOLIC BLOOD PRESSURE: 135 MMHG

## 2017-09-26 DIAGNOSIS — D46.9 MDS (MYELODYSPLASTIC SYNDROME): Primary | ICD-10-CM

## 2017-09-26 PROCEDURE — 63600175 PHARM REV CODE 636 W HCPCS: Performed by: INTERNAL MEDICINE

## 2017-09-26 PROCEDURE — 86644 CMV ANTIBODY: CPT

## 2017-09-26 PROCEDURE — 96401 CHEMO ANTI-NEOPL SQ/IM: CPT

## 2017-09-26 RX ORDER — AZACITIDINE 100 MG/1
75 INJECTION, POWDER, LYOPHILIZED, FOR SOLUTION INTRAVENOUS; SUBCUTANEOUS
Status: COMPLETED | OUTPATIENT
Start: 2017-09-26 | End: 2017-09-26

## 2017-09-26 RX ADMIN — AZACITIDINE 120 MG: 100 INJECTION, POWDER, LYOPHILIZED, FOR SOLUTION INTRAVENOUS; SUBCUTANEOUS at 01:09

## 2017-09-26 NOTE — NURSING
Patient here for vidaza injections-given in 3 divided doses-states feeling better since received transfusion-tolerated injections well.

## 2017-10-02 ENCOUNTER — TELEPHONE (OUTPATIENT)
Dept: HEMATOLOGY/ONCOLOGY | Facility: CLINIC | Age: 82
End: 2017-10-02

## 2017-10-02 ENCOUNTER — INFUSION (OUTPATIENT)
Dept: INFUSION THERAPY | Facility: HOSPITAL | Age: 82
End: 2017-10-02
Attending: INTERNAL MEDICINE
Payer: MEDICARE

## 2017-10-02 ENCOUNTER — LAB VISIT (OUTPATIENT)
Dept: LAB | Facility: HOSPITAL | Age: 82
End: 2017-10-02
Attending: INTERNAL MEDICINE
Payer: MEDICARE

## 2017-10-02 VITALS
SYSTOLIC BLOOD PRESSURE: 145 MMHG | HEART RATE: 56 BPM | TEMPERATURE: 98 F | RESPIRATION RATE: 16 BRPM | DIASTOLIC BLOOD PRESSURE: 55 MMHG

## 2017-10-02 DIAGNOSIS — D61.818 PANCYTOPENIA: ICD-10-CM

## 2017-10-02 DIAGNOSIS — D61.818 PANCYTOPENIA: Primary | ICD-10-CM

## 2017-10-02 DIAGNOSIS — D46.9 MYELODYSPLASTIC SYNDROME: ICD-10-CM

## 2017-10-02 DIAGNOSIS — D47.2 MONOCLONAL GAMMOPATHY OF UNDETERMINED SIGNIFICANCE: ICD-10-CM

## 2017-10-02 LAB
ABO + RH BLD: NORMAL
ALBUMIN SERPL BCP-MCNC: 2.9 G/DL
ALP SERPL-CCNC: 108 U/L
ALT SERPL W/O P-5'-P-CCNC: 7 U/L
ANION GAP SERPL CALC-SCNC: 5 MMOL/L
ANISOCYTOSIS BLD QL SMEAR: SLIGHT
AST SERPL-CCNC: 16 U/L
BASOPHILS # BLD AUTO: 0.01 K/UL
BASOPHILS NFR BLD: 0.6 %
BILIRUB SERPL-MCNC: 0.6 MG/DL
BLD GP AB SCN CELLS X3 SERPL QL: NORMAL
BLD PROD TYP BPU: NORMAL
BLD PROD TYP BPU: NORMAL
BLOOD UNIT EXPIRATION DATE: NORMAL
BLOOD UNIT EXPIRATION DATE: NORMAL
BLOOD UNIT TYPE CODE: 5100
BLOOD UNIT TYPE CODE: 6200
BLOOD UNIT TYPE: NORMAL
BLOOD UNIT TYPE: NORMAL
BUN SERPL-MCNC: 15 MG/DL
CALCIUM SERPL-MCNC: 9 MG/DL
CHLORIDE SERPL-SCNC: 109 MMOL/L
CO2 SERPL-SCNC: 27 MMOL/L
CODING SYSTEM: NORMAL
CODING SYSTEM: NORMAL
CREAT SERPL-MCNC: 1.2 MG/DL
DIFFERENTIAL METHOD: ABNORMAL
DISPENSE STATUS: NORMAL
DISPENSE STATUS: NORMAL
EOSINOPHIL # BLD AUTO: 0.2 K/UL
EOSINOPHIL NFR BLD: 12.3 %
ERYTHROCYTE [DISTWIDTH] IN BLOOD BY AUTOMATED COUNT: 15.7 %
EST. GFR  (AFRICAN AMERICAN): 49 ML/MIN/1.73 M^2
EST. GFR  (NON AFRICAN AMERICAN): 42.5 ML/MIN/1.73 M^2
GLUCOSE SERPL-MCNC: 86 MG/DL
HCT VFR BLD AUTO: 18.3 %
HGB BLD-MCNC: 6 G/DL
LYMPHOCYTES # BLD AUTO: 1.1 K/UL
LYMPHOCYTES NFR BLD: 69.8 %
MCH RBC QN AUTO: 28 PG
MCHC RBC AUTO-ENTMCNC: 33 G/DL
MCV RBC AUTO: 85 FL
MONOCYTES # BLD AUTO: 0 K/UL
MONOCYTES NFR BLD: 0.6 %
NEUTROPHILS # BLD AUTO: 0.3 K/UL
NEUTROPHILS NFR BLD: 16.7 %
NUM UNITS TRANS PACKED RBC: NORMAL
NUM UNITS TRANS WBC-POOR PLATPHERESIS: NORMAL
PLATELET # BLD AUTO: 4 K/UL
PLATELET BLD QL SMEAR: ABNORMAL
PMV BLD AUTO: 8.7 FL
POTASSIUM SERPL-SCNC: 3.8 MMOL/L
PROT SERPL-MCNC: 8 G/DL
RBC # BLD AUTO: 2.14 M/UL
SODIUM SERPL-SCNC: 141 MMOL/L
WBC # BLD AUTO: 1.55 K/UL

## 2017-10-02 PROCEDURE — 86900 BLOOD TYPING SEROLOGIC ABO: CPT

## 2017-10-02 PROCEDURE — P9038 RBC IRRADIATED: HCPCS

## 2017-10-02 PROCEDURE — P9037 PLATE PHERES LEUKOREDU IRRAD: HCPCS

## 2017-10-02 PROCEDURE — 86644 CMV ANTIBODY: CPT

## 2017-10-02 PROCEDURE — 85025 COMPLETE CBC W/AUTO DIFF WBC: CPT

## 2017-10-02 PROCEDURE — 86920 COMPATIBILITY TEST SPIN: CPT

## 2017-10-02 PROCEDURE — 25000003 PHARM REV CODE 250: Performed by: INTERNAL MEDICINE

## 2017-10-02 PROCEDURE — 36415 COLL VENOUS BLD VENIPUNCTURE: CPT

## 2017-10-02 PROCEDURE — 86901 BLOOD TYPING SEROLOGIC RH(D): CPT

## 2017-10-02 PROCEDURE — 27201040 HC RC 50 FILTER

## 2017-10-02 PROCEDURE — 63600175 PHARM REV CODE 636 W HCPCS: Performed by: INTERNAL MEDICINE

## 2017-10-02 PROCEDURE — 36430 TRANSFUSION BLD/BLD COMPNT: CPT

## 2017-10-02 PROCEDURE — 80053 COMPREHEN METABOLIC PANEL: CPT

## 2017-10-02 RX ORDER — HYDROCODONE BITARTRATE AND ACETAMINOPHEN 500; 5 MG/1; MG/1
TABLET ORAL ONCE
Status: DISCONTINUED | OUTPATIENT
Start: 2017-10-02 | End: 2017-10-02 | Stop reason: HOSPADM

## 2017-10-02 RX ORDER — DIPHENHYDRAMINE HYDROCHLORIDE 50 MG/ML
25 INJECTION INTRAMUSCULAR; INTRAVENOUS
Status: CANCELLED | OUTPATIENT
Start: 2017-10-02

## 2017-10-02 RX ORDER — ACETAMINOPHEN 325 MG/1
650 TABLET ORAL
Status: CANCELLED | OUTPATIENT
Start: 2017-10-02

## 2017-10-02 RX ORDER — ACETAMINOPHEN 325 MG/1
650 TABLET ORAL
Status: COMPLETED | OUTPATIENT
Start: 2017-10-02 | End: 2017-10-02

## 2017-10-02 RX ORDER — HYDROCODONE BITARTRATE AND ACETAMINOPHEN 500; 5 MG/1; MG/1
TABLET ORAL ONCE
Status: CANCELLED | OUTPATIENT
Start: 2017-10-02 | End: 2017-10-02

## 2017-10-02 RX ORDER — DIPHENHYDRAMINE HYDROCHLORIDE 50 MG/ML
25 INJECTION INTRAMUSCULAR; INTRAVENOUS
Status: COMPLETED | OUTPATIENT
Start: 2017-10-02 | End: 2017-10-02

## 2017-10-02 RX ADMIN — DIPHENHYDRAMINE HYDROCHLORIDE 25 MG: 50 INJECTION INTRAMUSCULAR; INTRAVENOUS at 03:10

## 2017-10-02 RX ADMIN — ACETAMINOPHEN 650 MG: 325 TABLET ORAL at 03:10

## 2017-10-02 NOTE — PLAN OF CARE
Problem: Patient Care Overview  Goal: Plan of Care Review  Outcome: Ongoing (interventions implemented as appropriate)  Pt tolerated 1u PRBC & platelets without adverse effects. VSS. Provided AVS & verbalized understanding of RTC date. DC with family ambulating independently.

## 2017-10-02 NOTE — PLAN OF CARE
Problem: Anemia (Adult)  Goal: Identify Related Risk Factors and Signs and Symptoms  Related risk factors and signs and symptoms are identified upon initiation of Human Response Clinical Practice Guideline (CPG)   Outcome: Ongoing (interventions implemented as appropriate)  Pt here for 1 unit platelets ans 1 unit packed red cells, labs, hx, meds, allergies reviewed, daughter with pt, reclined in chair, continue to monitor

## 2017-10-09 ENCOUNTER — TELEPHONE (OUTPATIENT)
Dept: HEMATOLOGY/ONCOLOGY | Facility: CLINIC | Age: 82
End: 2017-10-09

## 2017-10-09 ENCOUNTER — INFUSION (OUTPATIENT)
Dept: INFUSION THERAPY | Facility: HOSPITAL | Age: 82
End: 2017-10-09
Attending: INTERNAL MEDICINE
Payer: MEDICARE

## 2017-10-09 VITALS
SYSTOLIC BLOOD PRESSURE: 165 MMHG | TEMPERATURE: 98 F | HEART RATE: 50 BPM | DIASTOLIC BLOOD PRESSURE: 70 MMHG | RESPIRATION RATE: 20 BRPM

## 2017-10-09 DIAGNOSIS — D46.9 MDS (MYELODYSPLASTIC SYNDROME): Primary | ICD-10-CM

## 2017-10-09 LAB
BLD PROD TYP BPU: NORMAL
BLD PROD TYP BPU: NORMAL
BLOOD UNIT EXPIRATION DATE: NORMAL
BLOOD UNIT EXPIRATION DATE: NORMAL
BLOOD UNIT TYPE CODE: 5100
BLOOD UNIT TYPE CODE: 5100
BLOOD UNIT TYPE: NORMAL
BLOOD UNIT TYPE: NORMAL
CODING SYSTEM: NORMAL
CODING SYSTEM: NORMAL
DISPENSE STATUS: NORMAL
DISPENSE STATUS: NORMAL
NUM UNITS TRANS PACKED RBC: NORMAL
NUM UNITS TRANS WBC-POOR PLATPHERESIS: NORMAL

## 2017-10-09 PROCEDURE — 96375 TX/PRO/DX INJ NEW DRUG ADDON: CPT

## 2017-10-09 PROCEDURE — 96374 THER/PROPH/DIAG INJ IV PUSH: CPT

## 2017-10-09 PROCEDURE — P9038 RBC IRRADIATED: HCPCS

## 2017-10-09 PROCEDURE — 86920 COMPATIBILITY TEST SPIN: CPT

## 2017-10-09 PROCEDURE — P9037 PLATE PHERES LEUKOREDU IRRAD: HCPCS

## 2017-10-09 PROCEDURE — 36430 TRANSFUSION BLD/BLD COMPNT: CPT

## 2017-10-09 PROCEDURE — 25000003 PHARM REV CODE 250: Performed by: INTERNAL MEDICINE

## 2017-10-09 PROCEDURE — 63600175 PHARM REV CODE 636 W HCPCS: Performed by: INTERNAL MEDICINE

## 2017-10-09 RX ORDER — HYDROCODONE BITARTRATE AND ACETAMINOPHEN 500; 5 MG/1; MG/1
TABLET ORAL ONCE
Status: COMPLETED | OUTPATIENT
Start: 2017-10-09 | End: 2017-10-09

## 2017-10-09 RX ORDER — FUROSEMIDE 10 MG/ML
20 INJECTION INTRAMUSCULAR; INTRAVENOUS
Status: COMPLETED | OUTPATIENT
Start: 2017-10-09 | End: 2017-10-09

## 2017-10-09 RX ORDER — DIPHENHYDRAMINE HYDROCHLORIDE 50 MG/ML
25 INJECTION INTRAMUSCULAR; INTRAVENOUS
Status: COMPLETED | OUTPATIENT
Start: 2017-10-09 | End: 2017-10-09

## 2017-10-09 RX ORDER — DIPHENHYDRAMINE HYDROCHLORIDE 50 MG/ML
25 INJECTION INTRAMUSCULAR; INTRAVENOUS
Status: CANCELLED | OUTPATIENT
Start: 2017-10-09

## 2017-10-09 RX ORDER — HYDROCODONE BITARTRATE AND ACETAMINOPHEN 500; 5 MG/1; MG/1
TABLET ORAL ONCE
Status: CANCELLED | OUTPATIENT
Start: 2017-10-09 | End: 2017-10-09

## 2017-10-09 RX ORDER — FUROSEMIDE 10 MG/ML
20 INJECTION INTRAMUSCULAR; INTRAVENOUS
Status: CANCELLED | OUTPATIENT
Start: 2017-10-09

## 2017-10-09 RX ADMIN — FUROSEMIDE 20 MG: 10 INJECTION, SOLUTION INTRAMUSCULAR; INTRAVENOUS at 01:10

## 2017-10-09 RX ADMIN — DIPHENHYDRAMINE HYDROCHLORIDE 25 MG: 50 INJECTION INTRAMUSCULAR; INTRAVENOUS at 01:10

## 2017-10-09 RX ADMIN — SODIUM CHLORIDE: 9 INJECTION, SOLUTION INTRAVENOUS at 01:10

## 2017-10-09 NOTE — PLAN OF CARE
Problem: Patient Care Overview  Goal: Plan of Care Review  Outcome: Ongoing (interventions implemented as appropriate)  Pt received 1U PRBCs and 1U plt with no complications. VSS. Lasix administered with pre-meds. Pt instructed to call MD with any problems. Pt discharged home with family at side.

## 2017-10-14 PROBLEM — R50.81 NEUTROPENIC FEVER: Status: ACTIVE | Noted: 2017-10-14

## 2017-10-14 PROBLEM — D70.9 NEUTROPENIC FEVER: Status: ACTIVE | Noted: 2017-10-14

## 2017-10-15 ENCOUNTER — HOSPITAL ENCOUNTER (INPATIENT)
Facility: HOSPITAL | Age: 82
LOS: 4 days | Discharge: HOME | DRG: 193 | End: 2017-10-19
Attending: INTERNAL MEDICINE | Admitting: INTERNAL MEDICINE
Payer: MEDICARE

## 2017-10-15 DIAGNOSIS — D46.9 MDS (MYELODYSPLASTIC SYNDROME): ICD-10-CM

## 2017-10-15 DIAGNOSIS — D61.818 PANCYTOPENIA: Primary | ICD-10-CM

## 2017-10-15 DIAGNOSIS — R50.81 NEUTROPENIC FEVER: ICD-10-CM

## 2017-10-15 DIAGNOSIS — J18.9 PNEUMONIA OF LEFT LUNG DUE TO INFECTIOUS ORGANISM, UNSPECIFIED PART OF LUNG: ICD-10-CM

## 2017-10-15 DIAGNOSIS — D70.9 NEUTROPENIC FEVER: ICD-10-CM

## 2017-10-15 PROBLEM — J18.1 LUNG CONSOLIDATION: Status: ACTIVE | Noted: 2017-10-15

## 2017-10-15 LAB
ABO + RH BLD: NORMAL
ALBUMIN SERPL BCP-MCNC: 2.6 G/DL
ALP SERPL-CCNC: 78 U/L
ALT SERPL W/O P-5'-P-CCNC: 8 U/L
ANION GAP SERPL CALC-SCNC: 9 MMOL/L
ANISOCYTOSIS BLD QL SMEAR: SLIGHT
AST SERPL-CCNC: 18 U/L
BACTERIA #/AREA URNS AUTO: NORMAL /HPF
BASOPHILS # BLD AUTO: ABNORMAL K/UL
BASOPHILS NFR BLD: 0 %
BILIRUB SERPL-MCNC: 1.2 MG/DL
BILIRUB UR QL STRIP: NEGATIVE
BLD GP AB SCN CELLS X3 SERPL QL: NORMAL
BLD PROD TYP BPU: NORMAL
BLD PROD TYP BPU: NORMAL
BLOOD UNIT EXPIRATION DATE: NORMAL
BLOOD UNIT EXPIRATION DATE: NORMAL
BLOOD UNIT TYPE CODE: 5100
BLOOD UNIT TYPE CODE: 5100
BLOOD UNIT TYPE: NORMAL
BLOOD UNIT TYPE: NORMAL
BUN SERPL-MCNC: 14 MG/DL
CALCIUM SERPL-MCNC: 8.9 MG/DL
CHLORIDE SERPL-SCNC: 104 MMOL/L
CLARITY UR REFRACT.AUTO: CLEAR
CO2 SERPL-SCNC: 24 MMOL/L
CODING SYSTEM: NORMAL
CODING SYSTEM: NORMAL
COLOR UR AUTO: YELLOW
CREAT SERPL-MCNC: 1.2 MG/DL
DIFFERENTIAL METHOD: ABNORMAL
DISPENSE STATUS: NORMAL
DISPENSE STATUS: NORMAL
EOSINOPHIL # BLD AUTO: ABNORMAL K/UL
EOSINOPHIL NFR BLD: 4 %
ERYTHROCYTE [DISTWIDTH] IN BLOOD BY AUTOMATED COUNT: 14.9 %
EST. GFR  (AFRICAN AMERICAN): 49 ML/MIN/1.73 M^2
EST. GFR  (NON AFRICAN AMERICAN): 42.5 ML/MIN/1.73 M^2
GLUCOSE SERPL-MCNC: 107 MG/DL
GLUCOSE UR QL STRIP: NEGATIVE
GRAM STN SPEC: NORMAL
GRAM STN SPEC: NORMAL
HCT VFR BLD AUTO: 19.7 %
HGB BLD-MCNC: 6.8 G/DL
HGB UR QL STRIP: ABNORMAL
HYALINE CASTS UR QL AUTO: 0 /LPF
INR PPP: 1
KETONES UR QL STRIP: ABNORMAL
LEUKOCYTE ESTERASE UR QL STRIP: NEGATIVE
LYMPHOCYTES # BLD AUTO: ABNORMAL K/UL
LYMPHOCYTES NFR BLD: 50 %
MAGNESIUM SERPL-MCNC: 1.8 MG/DL
MCH RBC QN AUTO: 29.3 PG
MCHC RBC AUTO-ENTMCNC: 34.5 G/DL
MCV RBC AUTO: 85 FL
MICROSCOPIC COMMENT: NORMAL
MONOCYTES # BLD AUTO: ABNORMAL K/UL
MONOCYTES NFR BLD: 3 %
NEUTROPHILS NFR BLD: 41 %
NEUTS BAND NFR BLD MANUAL: 2 %
NITRITE UR QL STRIP: NEGATIVE
NUM UNITS TRANS PACKED RBC: NORMAL
NUM UNITS TRANS WBC-POOR PLATPHERESIS: NORMAL
PH UR STRIP: 6 [PH] (ref 5–8)
PHOSPHATE SERPL-MCNC: 2.5 MG/DL
PLATELET # BLD AUTO: 7 K/UL
PLATELET BLD QL SMEAR: ABNORMAL
PMV BLD AUTO: ABNORMAL FL
POTASSIUM SERPL-SCNC: 3.8 MMOL/L
PROT SERPL-MCNC: 8.3 G/DL
PROT UR QL STRIP: ABNORMAL
PROTHROMBIN TIME: 11.1 SEC
RBC # BLD AUTO: 2.32 M/UL
RBC #/AREA URNS AUTO: 1 /HPF (ref 0–4)
SODIUM SERPL-SCNC: 137 MMOL/L
SP GR UR STRIP: >=1.03 (ref 1–1.03)
SQUAMOUS #/AREA URNS AUTO: 4 /HPF
URN SPEC COLLECT METH UR: ABNORMAL
UROBILINOGEN UR STRIP-ACNC: 4 EU/DL
WBC # BLD AUTO: 0.72 K/UL
WBC #/AREA URNS AUTO: 0 /HPF (ref 0–5)

## 2017-10-15 PROCEDURE — P9038 RBC IRRADIATED: HCPCS

## 2017-10-15 PROCEDURE — 86900 BLOOD TYPING SEROLOGIC ABO: CPT

## 2017-10-15 PROCEDURE — 85007 BL SMEAR W/DIFF WBC COUNT: CPT

## 2017-10-15 PROCEDURE — 36430 TRANSFUSION BLD/BLD COMPNT: CPT

## 2017-10-15 PROCEDURE — 99222 1ST HOSP IP/OBS MODERATE 55: CPT | Mod: ,,, | Performed by: INTERNAL MEDICINE

## 2017-10-15 PROCEDURE — 63600175 PHARM REV CODE 636 W HCPCS: Performed by: INTERNAL MEDICINE

## 2017-10-15 PROCEDURE — 25000003 PHARM REV CODE 250: Performed by: INTERNAL MEDICINE

## 2017-10-15 PROCEDURE — 94760 N-INVAS EAR/PLS OXIMETRY 1: CPT

## 2017-10-15 PROCEDURE — P9037 PLATE PHERES LEUKOREDU IRRAD: HCPCS

## 2017-10-15 PROCEDURE — 36415 COLL VENOUS BLD VENIPUNCTURE: CPT

## 2017-10-15 PROCEDURE — 83735 ASSAY OF MAGNESIUM: CPT

## 2017-10-15 PROCEDURE — 87205 SMEAR GRAM STAIN: CPT

## 2017-10-15 PROCEDURE — 87086 URINE CULTURE/COLONY COUNT: CPT

## 2017-10-15 PROCEDURE — 27201040 HC RC 50 FILTER

## 2017-10-15 PROCEDURE — 25000003 PHARM REV CODE 250: Performed by: HOSPITALIST

## 2017-10-15 PROCEDURE — 20600001 HC STEP DOWN PRIVATE ROOM

## 2017-10-15 PROCEDURE — 81001 URINALYSIS AUTO W/SCOPE: CPT

## 2017-10-15 PROCEDURE — 80053 COMPREHEN METABOLIC PANEL: CPT

## 2017-10-15 PROCEDURE — 86644 CMV ANTIBODY: CPT

## 2017-10-15 PROCEDURE — 86920 COMPATIBILITY TEST SPIN: CPT

## 2017-10-15 PROCEDURE — 84100 ASSAY OF PHOSPHORUS: CPT

## 2017-10-15 PROCEDURE — 85610 PROTHROMBIN TIME: CPT

## 2017-10-15 PROCEDURE — 63600175 PHARM REV CODE 636 W HCPCS: Performed by: HOSPITALIST

## 2017-10-15 PROCEDURE — 85027 COMPLETE CBC AUTOMATED: CPT

## 2017-10-15 PROCEDURE — 86901 BLOOD TYPING SEROLOGIC RH(D): CPT

## 2017-10-15 PROCEDURE — 87040 BLOOD CULTURE FOR BACTERIA: CPT

## 2017-10-15 RX ORDER — AZITHROMYCIN 250 MG/1
500 TABLET, FILM COATED ORAL ONCE
Status: COMPLETED | OUTPATIENT
Start: 2017-10-15 | End: 2017-10-15

## 2017-10-15 RX ORDER — CEFEPIME HYDROCHLORIDE 1 G/50ML
1 INJECTION, SOLUTION INTRAVENOUS
Status: DISCONTINUED | OUTPATIENT
Start: 2017-10-15 | End: 2017-10-15

## 2017-10-15 RX ORDER — ONDANSETRON 8 MG/1
8 TABLET, ORALLY DISINTEGRATING ORAL EVERY 8 HOURS PRN
Status: DISCONTINUED | OUTPATIENT
Start: 2017-10-15 | End: 2017-10-19 | Stop reason: HOSPADM

## 2017-10-15 RX ORDER — ACYCLOVIR 200 MG/1
400 CAPSULE ORAL 2 TIMES DAILY
Status: DISCONTINUED | OUTPATIENT
Start: 2017-10-15 | End: 2017-10-19 | Stop reason: HOSPADM

## 2017-10-15 RX ORDER — CEFEPIME HYDROCHLORIDE 2 G/50ML
2 INJECTION, SOLUTION INTRAVENOUS
Status: DISCONTINUED | OUTPATIENT
Start: 2017-10-15 | End: 2017-10-15

## 2017-10-15 RX ORDER — HYDROCODONE BITARTRATE AND ACETAMINOPHEN 500; 5 MG/1; MG/1
TABLET ORAL
Status: DISCONTINUED | OUTPATIENT
Start: 2017-10-15 | End: 2017-10-17

## 2017-10-15 RX ORDER — ACETAMINOPHEN 325 MG/1
650 TABLET ORAL
Status: COMPLETED | OUTPATIENT
Start: 2017-10-15 | End: 2017-10-15

## 2017-10-15 RX ORDER — HYDROCODONE BITARTRATE AND ACETAMINOPHEN 5; 325 MG/1; MG/1
1 TABLET ORAL EVERY 4 HOURS PRN
Status: DISCONTINUED | OUTPATIENT
Start: 2017-10-15 | End: 2017-10-19 | Stop reason: HOSPADM

## 2017-10-15 RX ORDER — DIPHENHYDRAMINE HCL 25 MG
25 CAPSULE ORAL
Status: COMPLETED | OUTPATIENT
Start: 2017-10-15 | End: 2017-10-15

## 2017-10-15 RX ORDER — FUROSEMIDE 10 MG/ML
40 INJECTION INTRAMUSCULAR; INTRAVENOUS ONCE
Status: DISCONTINUED | OUTPATIENT
Start: 2017-10-15 | End: 2017-10-15

## 2017-10-15 RX ORDER — CEFEPIME HYDROCHLORIDE 2 G/50ML
2 INJECTION, SOLUTION INTRAVENOUS
Status: DISCONTINUED | OUTPATIENT
Start: 2017-10-16 | End: 2017-10-19

## 2017-10-15 RX ORDER — ACETAMINOPHEN 325 MG/1
650 TABLET ORAL EVERY 6 HOURS PRN
Status: DISCONTINUED | OUTPATIENT
Start: 2017-10-15 | End: 2017-10-19 | Stop reason: HOSPADM

## 2017-10-15 RX ORDER — AMOXICILLIN 250 MG
1 CAPSULE ORAL DAILY PRN
Status: DISCONTINUED | OUTPATIENT
Start: 2017-10-15 | End: 2017-10-19 | Stop reason: HOSPADM

## 2017-10-15 RX ORDER — LEVOTHYROXINE SODIUM 25 UG/1
25 TABLET ORAL
Status: DISCONTINUED | OUTPATIENT
Start: 2017-10-15 | End: 2017-10-19 | Stop reason: HOSPADM

## 2017-10-15 RX ADMIN — DIPHENHYDRAMINE HYDROCHLORIDE 25 MG: 25 CAPSULE ORAL at 08:10

## 2017-10-15 RX ADMIN — ACETAMINOPHEN 650 MG: 325 TABLET ORAL at 08:10

## 2017-10-15 RX ADMIN — LEVOTHYROXINE SODIUM 25 MCG: 25 TABLET ORAL at 06:10

## 2017-10-15 RX ADMIN — ACETAMINOPHEN 650 MG: 325 TABLET ORAL at 11:10

## 2017-10-15 RX ADMIN — ACYCLOVIR 400 MG: 200 CAPSULE ORAL at 08:10

## 2017-10-15 RX ADMIN — CEFEPIME HYDROCHLORIDE 2 G: 2 INJECTION, SOLUTION INTRAVENOUS at 01:10

## 2017-10-15 RX ADMIN — CEFEPIME HYDROCHLORIDE 2 G: 2 INJECTION, SOLUTION INTRAVENOUS at 06:10

## 2017-10-15 RX ADMIN — AZITHROMYCIN 500 MG: 250 TABLET, FILM COATED ORAL at 04:10

## 2017-10-15 NOTE — SUBJECTIVE & OBJECTIVE
Oncology Treatment Plan:   OP AZACITADINE 7-DAY (SUB-Q)     Oncology history:   Referred in  July 2017 for a Hematology consultation for further evaluation of anemia. 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 Not a stem cell transplant candidate due to age. Started on Vidaza on 8/22.       Medications:  Continuous Infusions:   Scheduled Meds:   ceFEPime (MAXIPIME) IVPB  2 g Intravenous Q12H    levothyroxine  25 mcg Oral Before breakfast     PRN Meds:sodium chloride, sodium chloride, acetaminophen, acetaminophen, diphenhydrAMINE, hydrocodone-acetaminophen 5-325mg, ondansetron, senna-docusate 8.6-50 mg     Review of patient's allergies indicates:   Allergen Reactions    Sulfa (sulfonamide antibiotics) Itching and Rash        Past Medical History:   Diagnosis Date    Allergy     Anemia     Arthritis     Cataract     CKD (chronic kidney disease)     Gout, unspecified     Hypertension     Hypothyroidism     MDS (myelodysplastic syndrome) 8/14/2017    Menopause     Sickle cell trait     Trigger finger      Past Surgical History:   Procedure Laterality Date    APPENDECTOMY      HEMORRHOID SURGERY      HYSTERECTOMY       Family History     Problem Relation (Age of Onset)    Cancer Son    Cataracts Father    Heart disease Father    Hypertension Mother    Peripheral vascular disease Father        Social History Main Topics    Smoking status: Former Smoker     Packs/day: 0.25     Years: 1.00     Types: Cigarettes     Quit date: 10/19/1972    Smokeless tobacco: Never Used      Comment: 10 yrs; she smoked 2 packs a week.    Alcohol use No    Drug use: No    Sexual activity: No       Review of Systems   Constitutional: Positive for fatigue and fever.   HENT: Negative for sneezing and sore throat.    Eyes: Negative for photophobia and visual disturbance.   Respiratory: Positive for cough. Negative for shortness of breath and wheezing.    Cardiovascular:  Positive for chest pain. Negative for palpitations and leg swelling.   Gastrointestinal: Negative for abdominal distention, diarrhea, nausea and vomiting.   Endocrine: Negative for polydipsia and polyuria.   Genitourinary: Negative for dysuria, flank pain, frequency and urgency.   Musculoskeletal: Negative for arthralgias and back pain.   Skin: Negative for pallor and rash.   Allergic/Immunologic: Positive for immunocompromised state.   Neurological: Positive for dizziness, weakness and light-headedness.   Hematological: Negative for adenopathy.   Psychiatric/Behavioral: Negative for agitation and behavioral problems.     Objective:     Vital Signs (Most Recent):  Temp: 99.8 °F (37.7 °C) (10/15/17 0435)  Pulse: 84 (10/15/17 0435)  Resp: 18 (10/15/17 0435)  BP: (!) 151/66 (10/15/17 0435)  SpO2: 95 % (10/15/17 0435) Vital Signs (24h Range):  Temp:  [99.8 °F (37.7 °C)-100.4 °F (38 °C)] 99.8 °F (37.7 °C)  Pulse:  [83-84] 84  Resp:  [16-18] 18  SpO2:  [95 %-96 %] 95 %  BP: (140-151)/(65-66) 151/66     Weight: 55.6 kg (122 lb 7.5 oz)  Body mass index is 20.38 kg/m².  Body surface area is 1.6 meters squared.    No intake or output data in the 24 hours ending 10/15/17 0532    Physical Exam   Constitutional: She is oriented to person, place, and time. She appears well-developed and well-nourished. No distress.   HENT:   Head: Normocephalic and atraumatic.   Eyes: EOM are normal. Pupils are equal, round, and reactive to light.   Neck: Normal range of motion. No thyromegaly present.   Cardiovascular: Normal rate and regular rhythm.    No murmur heard.  Pulmonary/Chest: Effort normal. No respiratory distress.   Crackles heard in left lung base.    Abdominal: Soft. She exhibits no distension. There is no tenderness.   Musculoskeletal: Normal range of motion. She exhibits no edema.   Neurological: She is alert and oriented to person, place, and time. No cranial nerve deficit.   Skin: Skin is warm and dry. No rash noted. No  erythema.   Psychiatric: She has a normal mood and affect. Her behavior is normal.       Significant Labs:   CBC:   Recent Labs  Lab 10/15/17  0306   WBC 0.72*   HGB 6.8*   HCT 19.7*   PLT 7*    and CMP:   Recent Labs  Lab 10/15/17  0306      K 3.8      CO2 24      BUN 14   CREATININE 1.2   CALCIUM 8.9   PROT 8.3   ALBUMIN 2.6*   BILITOT 1.2*   ALKPHOS 78   AST 18   ALT 8*   ANIONGAP 9   EGFRNONAA 42.5*       Diagnostic Results:  I have reviewed all pertinent imaging results/findings within the past 24 hours.  I have reviewed and interpreted all pertinent imaging results/findings within the past 24 hours.

## 2017-10-15 NOTE — PLAN OF CARE
BMT plan of care      CXR showing L lung opacity that may represent a wedge infarct from a PE vs infectious consolidation (bacterial vs fungal)  Pt presented with 3 days of pleuritic chest pain on L side, associated with non-bloody non-productive cough, SOB and [facility] did a CT chest. CD or paper report was not included in transfer paper work.    CXR opacity /CP  - concern for PE. At this time will not  significantly as cannot anticoagulate given severe thrombocytopenia and necessity for plt transfusions  - concern for bacterial PNA - cont cefepime  - concern for fungal etiology given chronic neutropenia - check fungitell and aspergillus antigen  - release of info faxed to OSH for CT chest results (ideally with CD delivered here) - if unable to obtain in sufficient time , will discuss repeat CT chest (with cont vs CTA) at [facility]  - f/u blood cx    MDS with Neutropenia  - start acyclovir ppx    Coby Dove MD  Internal Medicine PGY-3  Pager [phone]

## 2017-10-15 NOTE — ASSESSMENT & PLAN NOTE
Referred in  July 2017 for a Hematology consultation for further evaluation of anemia. 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 Not a stem cell transplant candidate due to age.   Started on Vidaza on 8/22.  Has required frequent transfusions as outpatient for pancytopenia.   Platelets 7, hemoglobin 6.8 and ANC is 295 today.

## 2017-10-15 NOTE — ASSESSMENT & PLAN NOTE
Secondary to MDS.   Will transfuse platelets and PRBCs today.   Transfuse for hemoglobin < 7 and platelets < 10.

## 2017-10-15 NOTE — PROGRESS NOTES
Admit note: Pt arrived via Wheelchair and is a direct admit.  AAOx4. Pt not C/O of pain, SOB or N/V.  V/s stable. Pt placed safely in bed. Pt oriented to room and call light. Safety precautions maintained. Bed is in low and locked position with side rails up x 2. Call bell is within reach of pt.      Skin note: No adverse skin issues noted.

## 2017-10-15 NOTE — ASSESSMENT & PLAN NOTE
Patient with ANC of 295. Fevers at OSH. T max of 100.4 on admission here.   CXR from OSH showed PNA. Will repeat here and order blood and urine cultures.   Continue Cefepime and Azithromycin to cover for PNA

## 2017-10-15 NOTE — H&P
[facility]  Hematology/Oncology  H&P    Patient Name: Shara Rose  MRN: [id number]  Admission Date: 10/15/2017  Code Status: Full Code   Attending Provider: Maurisio Forbes MD  Primary Care Physician: Alana Carter MD  Principal Problem:Neutropenic fever    Subjective:     HPI: Patient is a 83 yo female with hx of HTN, sickle cell trait and MDS who was transferred from OSH for further evaluation and treatment of neutropenic fever possibly secondary to PNA. Patient states she has felt weak and unsteady on her feet for the last few days. She also states she has had right sided chest pain that started 3 days ago that is worse with inspiration. Reports associated cough.     At OSH patient had CXR that showed PNA. EKG negative for ischemia and troponin negative. Labs consistent with pancytopenia with ANC of 295.      Oncology Treatment Plan:   OP AZACITADINE 7-DAY (SUB-Q)     Oncology history:   Referred in  July 2017 for a Hematology consultation for further evaluation of anemia. 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 Not a stem cell transplant candidate due to age. Started on Vidaza on 8/22.       Medications:  Continuous Infusions:   Scheduled Meds:   ceFEPime (MAXIPIME) IVPB  2 g Intravenous Q12H    levothyroxine  25 mcg Oral Before breakfast     PRN Meds:sodium chloride, sodium chloride, acetaminophen, acetaminophen, diphenhydrAMINE, hydrocodone-acetaminophen 5-325mg, ondansetron, senna-docusate 8.6-50 mg     Review of patient's allergies indicates:   Allergen Reactions    Sulfa (sulfonamide antibiotics) Itching and Rash        Past Medical History:   Diagnosis Date    Allergy     Anemia     Arthritis     Cataract     CKD (chronic kidney disease)     Gout, unspecified     Hypertension     Hypothyroidism     MDS (myelodysplastic syndrome) 8/14/2017    Menopause     Sickle cell trait     Trigger finger      Past  Surgical History:   Procedure Laterality Date    APPENDECTOMY      HEMORRHOID SURGERY      HYSTERECTOMY       Family History     Problem Relation (Age of Onset)    Cancer Son    Cataracts Father    Heart disease Father    Hypertension Mother    Peripheral vascular disease Father        Social History Main Topics    Smoking status: Former Smoker     Packs/day: 0.25     Years: 1.00     Types: Cigarettes     Quit date: 10/19/1972    Smokeless tobacco: Never Used      Comment: 10 yrs; she smoked 2 packs a week.    Alcohol use No    Drug use: No    Sexual activity: No       Review of Systems   Constitutional: Positive for fatigue and fever.   HENT: Negative for sneezing and sore throat.    Eyes: Negative for photophobia and visual disturbance.   Respiratory: Positive for cough. Negative for shortness of breath and wheezing.    Cardiovascular: Positive for chest pain. Negative for palpitations and leg swelling.   Gastrointestinal: Negative for abdominal distention, diarrhea, nausea and vomiting.   Endocrine: Negative for polydipsia and polyuria.   Genitourinary: Negative for dysuria, flank pain, frequency and urgency.   Musculoskeletal: Negative for arthralgias and back pain.   Skin: Negative for pallor and rash.   Allergic/Immunologic: Positive for immunocompromised state.   Neurological: Positive for dizziness, weakness and light-headedness.   Hematological: Negative for adenopathy.   Psychiatric/Behavioral: Negative for agitation and behavioral problems.     Objective:     Vital Signs (Most Recent):  Temp: 99.8 °F (37.7 °C) (10/15/17 0435)  Pulse: 84 (10/15/17 0435)  Resp: 18 (10/15/17 0435)  BP: (!) 151/66 (10/15/17 0435)  SpO2: 95 % (10/15/17 0435) Vital Signs (24h Range):  Temp:  [99.8 °F (37.7 °C)-100.4 °F (38 °C)] 99.8 °F (37.7 °C)  Pulse:  [83-84] 84  Resp:  [16-18] 18  SpO2:  [95 %-96 %] 95 %  BP: (140-151)/(65-66) 151/66     Weight: 55.6 kg (122 lb 7.5 oz)  Body mass index is 20.38 kg/m².  Body surface  area is 1.6 meters squared.    No intake or output data in the 24 hours ending 10/15/17 0532    Physical Exam   Constitutional: She is oriented to person, place, and time. She appears well-developed and well-nourished. No distress.   HENT:   Head: Normocephalic and atraumatic.   Eyes: EOM are normal. Pupils are equal, round, and reactive to light.   Neck: Normal range of motion. No thyromegaly present.   Cardiovascular: Normal rate and regular rhythm.    No murmur heard.  Pulmonary/Chest: Effort normal. No respiratory distress.   Crackles heard in left lung base.    Abdominal: Soft. She exhibits no distension. There is no tenderness.   Musculoskeletal: Normal range of motion. She exhibits no edema.   Neurological: She is alert and oriented to person, place, and time. No cranial nerve deficit.   Skin: Skin is warm and dry. No rash noted. No erythema.   Psychiatric: She has a normal mood and affect. Her behavior is normal.       Significant Labs:   CBC:   Recent Labs  Lab 10/15/17  0306   WBC 0.72*   HGB 6.8*   HCT 19.7*   PLT 7*    and CMP:   Recent Labs  Lab 10/15/17  0306      K 3.8      CO2 24      BUN 14   CREATININE 1.2   CALCIUM 8.9   PROT 8.3   ALBUMIN 2.6*   BILITOT 1.2*   ALKPHOS 78   AST 18   ALT 8*   ANIONGAP 9   EGFRNONAA 42.5*       Diagnostic Results:  I have reviewed all pertinent imaging results/findings within the past 24 hours.  I have reviewed and interpreted all pertinent imaging results/findings within the past 24 hours.    Assessment/Plan:     * Neutropenic fever    Patient with ANC of 295. Fevers at OSH. T max of 100.4 on admission here.   CXR from OSH showed PNA. Will repeat here and order blood and urine cultures.   Continue Cefepime and Azithromycin to cover for PNA         PNA (pneumonia)    OSH records state PNA found on CXR however cannot find official read.   Will repeat CXR here and start cefepime and Azithromycin emipirically.         MDS (myelodysplastic syndrome)     Referred in  July 2017 for a Hematology consultation for further evaluation of anemia. 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 Not a stem cell transplant candidate due to age.   Started on Vidaza on 8/22.  Has required frequent transfusions as outpatient for pancytopenia.   Platelets 7, hemoglobin 6.8 and ANC is 295 today.         Pancytopenia    Secondary to MDS.   Will transfuse platelets and PRBCs today.   Transfuse for hemoglobin < 7 and platelets < 10.             YEVGENIY Marks  Hematology/Oncology  [facility]

## 2017-10-15 NOTE — PLAN OF CARE
Problem: Patient Care Overview  Goal: Plan of Care Review  Outcome: Ongoing (interventions implemented as appropriate)  Pt AAOx4, VSS.  BPs 130s/60s.  HR 70s-80s, no TELE.  RA w/ O2 sats > 97%. Tmax 99.6 this am. AM labs monitored. WBC 0.72, neutropenic precautions maintained. H&H 6.8/19.7.  Platelets 7. 1 unit PRBCs and platelets given without any issues.  No repeats ordered.  Labs to be done in the am.  Pt denies any SOB, n/v, pain.  Chest x-ray done this am - results w/ possible mass vs dense pneumonic consolidation noted.  Cefepime IV on board q12h for possible pneumonia. Blood and urine cultures obtained on admit.  Pt up to bedside commode w/ 1 person assist.  200 cc urine output w/ 1 unmeasured.  1 BM.  Pt w/ little appetite noted.  PO intake encouraged.  No skin breakdown noted.  Bed lowered and locked in place.  Non-skid socks on feet.  Call bell in reach at all times.  Pt instructed to call nurse for assistance when needed.  See flowsheet for further assessment findings. Will continue to monitor.

## 2017-10-15 NOTE — HPI
Patient is a 83 yo female with hx of HTN, sickle cell trait and MDS who was transferred from OSH for further evaluation and treatment of neutropenic fever possibly secondary to PNA. Patient states she has felt weak and unsteady on her feet for the last few days. She also states she has had right sided chest pain that started 3 days ago that is worse with inspiration. Reports associated cough.     At OSH patient had CXR that showed PNA. EKG negative for ischemia and troponin negative. Labs consistent with pancytopenia with ANC of 295.

## 2017-10-15 NOTE — ASSESSMENT & PLAN NOTE
OSH records state PNA found on CXR however cannot find official read.   Will repeat CXR here and start cefepime and Azithromycin emipirically.

## 2017-10-16 PROBLEM — R53.81 DEBILITY: Status: ACTIVE | Noted: 2017-10-16

## 2017-10-16 LAB
ALBUMIN SERPL BCP-MCNC: 2.5 G/DL
ALP SERPL-CCNC: 74 U/L
ALT SERPL W/O P-5'-P-CCNC: 10 U/L
ANION GAP SERPL CALC-SCNC: 7 MMOL/L
AST SERPL-CCNC: 18 U/L
BACTERIA UR CULT: NO GROWTH
BASOPHILS # BLD AUTO: 0 K/UL
BASOPHILS NFR BLD: 0 %
BILIRUB SERPL-MCNC: 1.1 MG/DL
BUN SERPL-MCNC: 15 MG/DL
CALCIUM SERPL-MCNC: 9.2 MG/DL
CHLORIDE SERPL-SCNC: 107 MMOL/L
CO2 SERPL-SCNC: 24 MMOL/L
CREAT SERPL-MCNC: 1.1 MG/DL
DIFFERENTIAL METHOD: ABNORMAL
EOSINOPHIL # BLD AUTO: 0.1 K/UL
EOSINOPHIL NFR BLD: 7 %
ERYTHROCYTE [DISTWIDTH] IN BLOOD BY AUTOMATED COUNT: 14.5 %
EST. GFR  (AFRICAN AMERICAN): 54.4 ML/MIN/1.73 M^2
EST. GFR  (NON AFRICAN AMERICAN): 47.2 ML/MIN/1.73 M^2
GLUCOSE SERPL-MCNC: 94 MG/DL
HCT VFR BLD AUTO: 20.7 %
HGB BLD-MCNC: 7.1 G/DL
IMM GRANULOCYTES # BLD AUTO: 0.01 K/UL
IMM GRANULOCYTES NFR BLD AUTO: 1 %
LYMPHOCYTES # BLD AUTO: 0.5 K/UL
LYMPHOCYTES NFR BLD: 51 %
MCH RBC QN AUTO: 29.7 PG
MCHC RBC AUTO-ENTMCNC: 34.3 G/DL
MCV RBC AUTO: 87 FL
MONOCYTES # BLD AUTO: 0.1 K/UL
MONOCYTES NFR BLD: 5 %
NEUTROPHILS # BLD AUTO: 0.4 K/UL
NEUTROPHILS NFR BLD: 36 %
NRBC BLD-RTO: 0 /100 WBC
PLATELET # BLD AUTO: 38 K/UL
PMV BLD AUTO: 11.2 FL
POTASSIUM SERPL-SCNC: 3.9 MMOL/L
PROT SERPL-MCNC: 8 G/DL
RBC # BLD AUTO: 2.39 M/UL
SODIUM SERPL-SCNC: 138 MMOL/L
WBC # BLD AUTO: 1 K/UL

## 2017-10-16 PROCEDURE — 85025 COMPLETE CBC W/AUTO DIFF WBC: CPT

## 2017-10-16 PROCEDURE — 87449 NOS EACH ORGANISM AG IA: CPT

## 2017-10-16 PROCEDURE — 25000003 PHARM REV CODE 250: Performed by: HOSPITALIST

## 2017-10-16 PROCEDURE — 80053 COMPREHEN METABOLIC PANEL: CPT

## 2017-10-16 PROCEDURE — 36415 COLL VENOUS BLD VENIPUNCTURE: CPT

## 2017-10-16 PROCEDURE — 63600175 PHARM REV CODE 636 W HCPCS: Performed by: HOSPITALIST

## 2017-10-16 PROCEDURE — 87040 BLOOD CULTURE FOR BACTERIA: CPT | Mod: 59

## 2017-10-16 PROCEDURE — 20600001 HC STEP DOWN PRIVATE ROOM

## 2017-10-16 PROCEDURE — 99232 SBSQ HOSP IP/OBS MODERATE 35: CPT | Mod: ,,, | Performed by: INTERNAL MEDICINE

## 2017-10-16 PROCEDURE — 87305 ASPERGILLUS AG IA: CPT

## 2017-10-16 PROCEDURE — 25000003 PHARM REV CODE 250: Performed by: INTERNAL MEDICINE

## 2017-10-16 RX ORDER — VALSARTAN 40 MG/1
40 TABLET ORAL DAILY
Status: DISCONTINUED | OUTPATIENT
Start: 2017-10-16 | End: 2017-10-17

## 2017-10-16 RX ORDER — FLUCONAZOLE 200 MG/1
400 TABLET ORAL DAILY
Status: DISCONTINUED | OUTPATIENT
Start: 2017-10-16 | End: 2017-10-16

## 2017-10-16 RX ORDER — SODIUM CHLORIDE 9 MG/ML
INJECTION, SOLUTION INTRAVENOUS CONTINUOUS
Status: ACTIVE | OUTPATIENT
Start: 2017-10-16 | End: 2017-10-16

## 2017-10-16 RX ORDER — FLUCONAZOLE 200 MG/1
200 TABLET ORAL DAILY
Status: DISCONTINUED | OUTPATIENT
Start: 2017-10-17 | End: 2017-10-19 | Stop reason: HOSPADM

## 2017-10-16 RX ADMIN — LEVOTHYROXINE SODIUM 25 MCG: 25 TABLET ORAL at 05:10

## 2017-10-16 RX ADMIN — VALSARTAN 40 MG: 40 TABLET ORAL at 09:10

## 2017-10-16 RX ADMIN — HYDROCODONE BITARTRATE AND ACETAMINOPHEN 1 TABLET: 5; 325 TABLET ORAL at 09:10

## 2017-10-16 RX ADMIN — FLUCONAZOLE 400 MG: 200 TABLET ORAL at 09:10

## 2017-10-16 RX ADMIN — CEFEPIME HYDROCHLORIDE 2 G: 2 INJECTION, SOLUTION INTRAVENOUS at 02:10

## 2017-10-16 RX ADMIN — ACYCLOVIR 400 MG: 200 CAPSULE ORAL at 09:10

## 2017-10-16 RX ADMIN — SODIUM CHLORIDE: 0.9 INJECTION, SOLUTION INTRAVENOUS at 04:10

## 2017-10-16 RX ADMIN — ACYCLOVIR 400 MG: 200 CAPSULE ORAL at 08:10

## 2017-10-16 NOTE — ASSESSMENT & PLAN NOTE
"- CTA from [facility] (image above) with "masslike consolidation in the left upper lobe, suspicious for pneumonia in appropriate clinical setting ". Otherwise, there is concern for neoplasm  - cont abx for PNA as above  "

## 2017-10-16 NOTE — PROGRESS NOTES
[facility]  Hematology  Bone Marrow Transplant  Progress Note    Patient Name: Shara Rose  Admission Date: 10/15/2017  Hospital Length of Stay: 1 days  Code Status: Full Code    Subjective:     Interval History: Fever of 102.9F, stable/ elevated BPs over night. On abx. Otherwise doing well.        acyclovir  400 mg Oral BID    ceFEPime (MAXIPIME) IVPB  2 g Intravenous Q12H    [START ON 10/17/2017] fluconazole  200 mg Oral Daily    levothyroxine  25 mcg Oral Before breakfast    valsartan  40 mg Oral Daily         Objective:     Vital Signs (Most Recent):  Temp: 97.4 °F (36.3 °C) (10/16/17 1146)  Pulse: 75 (10/16/17 1146)  Resp: 18 (10/16/17 1146)  BP: (!) 150/67 (10/16/17 1146)  SpO2: 98 % (10/16/17 1146) Vital Signs (24h Range):  Temp:  [97.4 °F (36.3 °C)-102.9 °F (39.4 °C)] 97.4 °F (36.3 °C)  Pulse:  [74-96] 75  Resp:  [17-18] 18  SpO2:  [96 %-99 %] 98 %  BP: (138-181)/(60-77) 150/67     Weight: 55.2 kg (121 lb 11.1 oz)  Body mass index is 20.25 kg/m².  Body surface area is 1.59 meters squared.    ECOG SCORE         [unfilled]    Intake/Output - Last 3 Shifts       10/14 0700 - 10/15 0659 10/15 0700 - 10/16 0659 10/16 0700 - 10/17 0659    P.O. 240 640 50    Blood  300     IV Piggyback  100     Total Intake(mL/kg) 240 (4.3) 1040 (18.8) 50 (0.9)    Urine (mL/kg/hr) 300 1000 (0.8)     Stool  0 (0)     Total Output 300 1000      Net -60 +40 +50           Urine Occurrence  1 x     Stool Occurrence  1 x           Physical Exam   Constitutional: She is oriented to person, place, and time. She appears well-developed and well-nourished. No distress.   HENT:   Head: Normocephalic and atraumatic.   Eyes: EOM are normal. Pupils are equal, round, and reactive to light.   Neck: Normal range of motion. No thyromegaly present.   Cardiovascular: Normal rate and regular rhythm.    No murmur heard.  Pulmonary/Chest: Effort normal. No respiratory distress.   Crackles heard in left lung base.    Abdominal:  "Soft. She exhibits no distension. There is no tenderness.   Musculoskeletal: Normal range of motion. She exhibits no edema.   Neurological: She is alert and oriented to person, place, and time. No cranial nerve deficit.   Skin: Skin is warm and dry. No rash noted. No erythema.   Psychiatric: She has a normal mood and affect. Her behavior is normal.       Significant Labs:   CBC:   Recent Labs  Lab 10/15/17  0306 10/16/17  0853   WBC 0.72* 1.00*   HGB 6.8* 7.1*   HCT 19.7* 20.7*   PLT 7* 38*   , CMP:   Recent Labs  Lab 10/15/17  0306 10/16/17  0853    138   K 3.8 3.9    107   CO2 24 24    94   BUN 14 15   CREATININE 1.2 1.1   CALCIUM 8.9 9.2   PROT 8.3 8.0   ALBUMIN 2.6* 2.5*   BILITOT 1.2* 1.1*   ALKPHOS 78 74   AST 18 18   ALT 8* 10   ANIONGAP 9 7*   EGFRNONAA 42.5* 47.2*   , LDH: No results for input(s): LDHCSF, BFSOURCE in the last 48 hours., LFTs:   Recent Labs  Lab 10/15/17  0306 10/16/17  0853   ALT 8* 10   AST 18 18   ALKPHOS 78 74   BILITOT 1.2* 1.1*   PROT 8.3 8.0   ALBUMIN 2.6* 2.5*    and Urine Studies:     Diagnostic Results:            Assessment/Plan:     * Neutropenic fever    - likely source is PNA, ANC <500  - IV cefepime renally dose  - check aspergillus antigen and fungitell  - fungal ppx        MDS (myelodysplastic syndrome)    -Referred in  July 2017 for a Hematology consultation for further evaluation of anemia. 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 Not a stem cell transplant candidate due to age.   -Started on Vidaza on 8/22 and received it 9/26.  - acyclovir and fluconazole ppx        PNA (pneumonia)    - see "neutropenic fever"        Lung consolidation    - CTA from [facility] (image above) with "masslike consolidation in the left upper lobe, suspicious for pneumonia in appropriate clinical setting ". Otherwise, there is concern for neoplasm  - cont abx for PNA as above    - CT chest with contrast " for further eval  - NPO after MN for possible (?) bronch vs IR bx        Pancytopenia    - likely 2/2 chemo and MDS  - chronic neutropenia with ANC <500  - transfuse for hbg < 7 and plts <10        Stage 2 chronic kidney disease    - Cr of 1.1-1.2, baseline  - monitor renal function and renally dose meds        Hypertension    - restart valsartan (home med) at a lower dose 40mg daily        Debility    - PT/OT        Hypothyroidism    - cont home synthroid  - check TSH            VTE Risk Mitigation         Ordered     Medium Risk of VTE  Once      10/15/17 0234     Place sequential compression device  Until discontinued      10/15/17 0234          Disposition: cont current regimen     Coby Dove MD  Bone Marrow Transplant  [facility]

## 2017-10-16 NOTE — ASSESSMENT & PLAN NOTE
- likely source is PNA, ANC <500  - IV cefepime renally dose  - check aspergillus antigen and fungitell  - fungal ppx

## 2017-10-16 NOTE — PROGRESS NOTES
Patient is progressing and involved with plan of care. Frequent rounds made to assess pain and safety. Pt communicating needs throughout shift. Up with stand by assist to BSC. Poor appetite, voiding without difficulty. No c/o pain.Cefepime continued as ordered. Pt t-max 102.9. Resolved with tylenol.Pt noted to be hypertensive as well. MD aware.   All other vitals stable; no acute events this shift. Pt. Remaining free from falls or injury throughout shift; bed in lowest position; side rails up X2; call light within reach; pt instructed to call for assistance as needed - verbalized understanding. Q2h rounding on patient. Will continue to monitor.

## 2017-10-16 NOTE — PROGRESS NOTES
Admit Assessment    Patient Identification  Shara Rose   :  1935  Admit Date:  10/15/2017  Attending Provider:  Maurisio Forbes MD              Referral:   Pt was admitted to  with a diagnosis of Neutropenic fever, and was admitted this hospital stay due to Neutropenic fever [D70.9, R50.81]  Pneumonia [J18.9]  Neutropenic fever [D70.9, R50.81].   is involved was referred to the Social Work Department via (Referal).  Patient presents as a 82 y.o. year old  female.    Persons interviewed: pt    Living Situation:      Resides at [address] [address] [facility] [address], phone: [phone] (home).    Prior to admission pt was living with her spouse Ramesh. Pt was independent and was able to drive.     Functional Status Prior  Ambulation: 2-->assistive person  Transferrin-->assistive person  Toiletin-->independent  Bathin-->assistive person  Dressin-->assistive person  Eatin-->independent  Communication: 0-->understands/communicates without difficulty  Swallowin-->swallows foods/liquids without difficulty    Current or Past Agencies and Description of Services/Supplies    DME  N/A    Home Health  N/A    IV Infusion  N/A    Nutrition: oral    Outpatient Pharmacy:     [facility] - [address] AT [facility]  [address] [facility] [address]  Phone: [phone] Fax: [phone]    [facility] - [facility] [address]  [address] [facility] [address]  Phone: [phone] Fax: [phone]      Patient Preference of agencies include none noted    Patient/Caregiver informed of right to choose providers or agencies.  Patient provides permission to release any necessary information to [facility] and to [facility] agencies as needed to facilitate patient care, treatment planning, and patient discharge planning.  Written and verbal  resources provided.      Coping  Pt stated she is in good spirits    Adjustment to Diagnosis and Treatment  appropriate    Emotional/Behavioral/Cognitive Issues  None noted         History/Current Symptoms of Anxiety/Depression: No  History/Current Substance Use:   Social History     Social History Main Topics    Smoking status: Former Smoker     Packs/day: 0.25     Years: 1.00     Types: Cigarettes     Quit date: 10/19/1972    Smokeless tobacco: Never Used      Comment: 10 yrs; she smoked 2 packs a week.    Alcohol use No    Drug use: No    Sexual activity: No       Indications of Abuse/Neglect: No  Abuse Screen  Do You Feel Unsafe at Home, Work or School?: no    Financial:  Payor/Plan Subscr  Sex Relation Sub. Ins. ID Effective Group Num   1. [employer] MANAGE* DAPHNIE MARTINEZ 1935 Female  [id number]                                   [address] [address]      Other identified concerns/needs: none at this time    Plan: Pt anticipated to return home with family support at [place]     Interventions/Referrals: none  Patient/caregiver engaged in treatment planning process.     providing psychosocial and supportive counseling, resources, education, assistance and discharge planning as appropriate.  Patient/caregiver state understanding of  available resources,  following, remains available.

## 2017-10-16 NOTE — PLAN OF CARE
Problem: Fall Risk (Adult)  Intervention: Patient Rounds  Patient remains free from falls and injury this shift. Bed in low, locked position with call bell in reach. Family at bedside. Patient encouraged to call for assistance when getting out of bed. Patient verbalized understanding. Pt on IV cefepime. Pt afebrile throughout shift. All belongings within reach will continue to monitor.

## 2017-10-16 NOTE — SUBJECTIVE & OBJECTIVE
Subjective:     Interval History: Fever of 102.9F, stable/ elevated BPs over night. On abx. Otherwise doing well.        acyclovir  400 mg Oral BID    ceFEPime (MAXIPIME) IVPB  2 g Intravenous Q12H    [START ON 10/17/2017] fluconazole  200 mg Oral Daily    levothyroxine  25 mcg Oral Before breakfast    valsartan  40 mg Oral Daily         Objective:     Vital Signs (Most Recent):  Temp: 97.4 °F (36.3 °C) (10/16/17 1146)  Pulse: 75 (10/16/17 1146)  Resp: 18 (10/16/17 1146)  BP: (!) 150/67 (10/16/17 1146)  SpO2: 98 % (10/16/17 1146) Vital Signs (24h Range):  Temp:  [97.4 °F (36.3 °C)-102.9 °F (39.4 °C)] 97.4 °F (36.3 °C)  Pulse:  [74-96] 75  Resp:  [17-18] 18  SpO2:  [96 %-99 %] 98 %  BP: (138-181)/(60-77) 150/67     Weight: 55.2 kg (121 lb 11.1 oz)  Body mass index is 20.25 kg/m².  Body surface area is 1.59 meters squared.    ECOG SCORE         [unfilled]    Intake/Output - Last 3 Shifts       10/14 0700 - 10/15 0659 10/15 0700 - 10/16 0659 10/16 0700 - 10/17 0659    P.O. 240 640 50    Blood  300     IV Piggyback  100     Total Intake(mL/kg) 240 (4.3) 1040 (18.8) 50 (0.9)    Urine (mL/kg/hr) 300 1000 (0.8)     Stool  0 (0)     Total Output 300 1000      Net -60 +40 +50           Urine Occurrence  1 x     Stool Occurrence  1 x           Physical Exam   Constitutional: She is oriented to person, place, and time. She appears well-developed and well-nourished. No distress.   HENT:   Head: Normocephalic and atraumatic.   Eyes: EOM are normal. Pupils are equal, round, and reactive to light.   Neck: Normal range of motion. No thyromegaly present.   Cardiovascular: Normal rate and regular rhythm.    No murmur heard.  Pulmonary/Chest: Effort normal. No respiratory distress.   Crackles heard in left lung base.    Abdominal: Soft. She exhibits no distension. There is no tenderness.   Musculoskeletal: Normal range of motion. She exhibits no edema.   Neurological: She is alert and oriented to person, place, and time. No  cranial nerve deficit.   Skin: Skin is warm and dry. No rash noted. No erythema.   Psychiatric: She has a normal mood and affect. Her behavior is normal.       Significant Labs:   CBC:   Recent Labs  Lab 10/15/17  0306 10/16/17  0853   WBC 0.72* 1.00*   HGB 6.8* 7.1*   HCT 19.7* 20.7*   PLT 7* 38*   , CMP:   Recent Labs  Lab 10/15/17  0306 10/16/17  0853    138   K 3.8 3.9    107   CO2 24 24    94   BUN 14 15   CREATININE 1.2 1.1   CALCIUM 8.9 9.2   PROT 8.3 8.0   ALBUMIN 2.6* 2.5*   BILITOT 1.2* 1.1*   ALKPHOS 78 74   AST 18 18   ALT 8* 10   ANIONGAP 9 7*   EGFRNONAA 42.5* 47.2*   , LDH: No results for input(s): LDHCSF, BFSOURCE in the last 48 hours., LFTs:   Recent Labs  Lab 10/15/17  0306 10/16/17  0853   ALT 8* 10   AST 18 18   ALKPHOS 78 74   BILITOT 1.2* 1.1*   PROT 8.3 8.0   ALBUMIN 2.6* 2.5*    and Urine Studies:     Diagnostic Results:

## 2017-10-16 NOTE — ASSESSMENT & PLAN NOTE
- likely 2/2 chemo and MDS  - chronic neutropenia with ANC <500  - transfuse for hbg < 7 and plts <10

## 2017-10-16 NOTE — PLAN OF CARE
MDR's with Dr Forbes.  Patient has a hx of MDS and received OP Vidaza.  Now admitted for neutropenic fever.  T max 102.9 and an infectious workup is in process. On IV cefepime.  Patient also c/o of CP and SOB.  Cardiac workup has been negative so far.  PRBC's and platelets transfused today.  Will continue to follow for d/c needs.

## 2017-10-16 NOTE — ASSESSMENT & PLAN NOTE
-Referred in  July 2017 for a Hematology consultation for further evaluation of anemia. 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 Not a stem cell transplant candidate due to age.   -Started on Vidaza on 8/22 and received it 9/26.  - acyclovir and fluconazole ppx

## 2017-10-16 NOTE — PROGRESS NOTES
RN contact MD on call. Pt w/ temp of 101.2 and bp 179/77 as per PCT. Rechecked by RN, temp 102.9 bp 181/77. BC done this morning and pt on cefepime. Tylenol given and will recheck pt in 1 hr. Will continue to monitor.

## 2017-10-17 LAB
ALBUMIN SERPL BCP-MCNC: 2.3 G/DL
ALP SERPL-CCNC: 72 U/L
ALT SERPL W/O P-5'-P-CCNC: 8 U/L
ANION GAP SERPL CALC-SCNC: 6 MMOL/L
ANISOCYTOSIS BLD QL SMEAR: SLIGHT
AST SERPL-CCNC: 15 U/L
BASOPHILS # BLD AUTO: 0 K/UL
BASOPHILS NFR BLD: 0 %
BILIRUB SERPL-MCNC: 0.5 MG/DL
BLD PROD TYP BPU: NORMAL
BLOOD UNIT EXPIRATION DATE: NORMAL
BLOOD UNIT TYPE CODE: 5100
BLOOD UNIT TYPE: NORMAL
BUN SERPL-MCNC: 13 MG/DL
CALCIUM SERPL-MCNC: 8.7 MG/DL
CHLORIDE SERPL-SCNC: 108 MMOL/L
CO2 SERPL-SCNC: 24 MMOL/L
CODING SYSTEM: NORMAL
CREAT SERPL-MCNC: 1 MG/DL
DIFFERENTIAL METHOD: ABNORMAL
DISPENSE STATUS: NORMAL
EOSINOPHIL # BLD AUTO: 0.1 K/UL
EOSINOPHIL NFR BLD: 10.2 %
ERYTHROCYTE [DISTWIDTH] IN BLOOD BY AUTOMATED COUNT: 14.6 %
EST. GFR  (AFRICAN AMERICAN): >60 ML/MIN/1.73 M^2
EST. GFR  (NON AFRICAN AMERICAN): 53 ML/MIN/1.73 M^2
GALACTOMANNAN AG SERPL IA-ACNC: <0.5 INDEX
GLUCOSE SERPL-MCNC: 98 MG/DL
HCT VFR BLD AUTO: 19.6 %
HGB BLD-MCNC: 6.6 G/DL
HYPOCHROMIA BLD QL SMEAR: ABNORMAL
IMM GRANULOCYTES # BLD AUTO: 0 K/UL
IMM GRANULOCYTES NFR BLD AUTO: 0 %
INR PPP: 1
LYMPHOCYTES # BLD AUTO: 0.7 K/UL
LYMPHOCYTES NFR BLD: 60.2 %
MCH RBC QN AUTO: 30 PG
MCHC RBC AUTO-ENTMCNC: 33.7 G/DL
MCV RBC AUTO: 89 FL
MONOCYTES # BLD AUTO: 0.1 K/UL
MONOCYTES NFR BLD: 5.6 %
NEUTROPHILS # BLD AUTO: 0.3 K/UL
NEUTROPHILS NFR BLD: 24 %
NRBC BLD-RTO: 0 /100 WBC
NUM UNITS TRANS PACKED RBC: NORMAL
OVALOCYTES BLD QL SMEAR: ABNORMAL
PLATELET # BLD AUTO: 29 K/UL
PLATELET BLD QL SMEAR: ABNORMAL
PMV BLD AUTO: 11.2 FL
POIKILOCYTOSIS BLD QL SMEAR: SLIGHT
POLYCHROMASIA BLD QL SMEAR: ABNORMAL
POTASSIUM SERPL-SCNC: 3.8 MMOL/L
PROT SERPL-MCNC: 7.4 G/DL
PROTHROMBIN TIME: 10.9 SEC
RBC # BLD AUTO: 2.2 M/UL
SODIUM SERPL-SCNC: 138 MMOL/L
WBC # BLD AUTO: 1.08 K/UL

## 2017-10-17 PROCEDURE — 20600001 HC STEP DOWN PRIVATE ROOM

## 2017-10-17 PROCEDURE — 25500020 PHARM REV CODE 255: Performed by: INTERNAL MEDICINE

## 2017-10-17 PROCEDURE — 97165 OT EVAL LOW COMPLEX 30 MIN: CPT

## 2017-10-17 PROCEDURE — 99232 SBSQ HOSP IP/OBS MODERATE 35: CPT | Mod: ,,, | Performed by: INTERNAL MEDICINE

## 2017-10-17 PROCEDURE — 25000003 PHARM REV CODE 250: Performed by: INTERNAL MEDICINE

## 2017-10-17 PROCEDURE — 27201040 HC RC 50 FILTER

## 2017-10-17 PROCEDURE — 25000003 PHARM REV CODE 250: Performed by: NURSE PRACTITIONER

## 2017-10-17 PROCEDURE — 85025 COMPLETE CBC W/AUTO DIFF WBC: CPT

## 2017-10-17 PROCEDURE — 25000003 PHARM REV CODE 250: Performed by: HOSPITALIST

## 2017-10-17 PROCEDURE — P9038 RBC IRRADIATED: HCPCS

## 2017-10-17 PROCEDURE — 97161 PT EVAL LOW COMPLEX 20 MIN: CPT

## 2017-10-17 PROCEDURE — 80053 COMPREHEN METABOLIC PANEL: CPT

## 2017-10-17 PROCEDURE — 86644 CMV ANTIBODY: CPT

## 2017-10-17 PROCEDURE — 36415 COLL VENOUS BLD VENIPUNCTURE: CPT

## 2017-10-17 PROCEDURE — 36430 TRANSFUSION BLD/BLD COMPNT: CPT

## 2017-10-17 PROCEDURE — 99406 BEHAV CHNG SMOKING 3-10 MIN: CPT

## 2017-10-17 PROCEDURE — 63600175 PHARM REV CODE 636 W HCPCS: Performed by: HOSPITALIST

## 2017-10-17 PROCEDURE — 85610 PROTHROMBIN TIME: CPT

## 2017-10-17 RX ORDER — VALSARTAN 40 MG/1
80 TABLET ORAL DAILY
Status: DISCONTINUED | OUTPATIENT
Start: 2017-10-17 | End: 2017-10-19 | Stop reason: HOSPADM

## 2017-10-17 RX ORDER — DIPHENHYDRAMINE HCL 25 MG
25 CAPSULE ORAL EVERY 6 HOURS PRN
Status: DISCONTINUED | OUTPATIENT
Start: 2017-10-17 | End: 2017-10-19 | Stop reason: HOSPADM

## 2017-10-17 RX ORDER — DIPHENHYDRAMINE HCL 25 MG
25 CAPSULE ORAL ONCE
Status: COMPLETED | OUTPATIENT
Start: 2017-10-17 | End: 2017-10-17

## 2017-10-17 RX ADMIN — IOHEXOL 75 ML: 350 INJECTION, SOLUTION INTRAVENOUS at 12:10

## 2017-10-17 RX ADMIN — VALSARTAN 80 MG: 40 TABLET ORAL at 08:10

## 2017-10-17 RX ADMIN — CEFEPIME HYDROCHLORIDE 2 G: 2 INJECTION, SOLUTION INTRAVENOUS at 02:10

## 2017-10-17 RX ADMIN — ACETAMINOPHEN 650 MG: 325 TABLET ORAL at 09:10

## 2017-10-17 RX ADMIN — CEFEPIME HYDROCHLORIDE 2 G: 2 INJECTION, SOLUTION INTRAVENOUS at 01:10

## 2017-10-17 RX ADMIN — FLUCONAZOLE 200 MG: 200 TABLET ORAL at 08:10

## 2017-10-17 RX ADMIN — LEVOTHYROXINE SODIUM 25 MCG: 25 TABLET ORAL at 05:10

## 2017-10-17 RX ADMIN — HYDROCODONE BITARTRATE AND ACETAMINOPHEN 1 TABLET: 5; 325 TABLET ORAL at 08:10

## 2017-10-17 RX ADMIN — DIPHENHYDRAMINE HYDROCHLORIDE 25 MG: 25 CAPSULE ORAL at 09:10

## 2017-10-17 RX ADMIN — ACYCLOVIR 400 MG: 200 CAPSULE ORAL at 08:10

## 2017-10-17 NOTE — PROGRESS NOTES
Patient Consulted by CTTS:     The following was discussed by the Tobacco Treatment Specialist:  ? Relevance of Quitting  ? Risk to Health  ? Long Term Risk  ? Risk for Others  ? Rewards of Quitting  ? Motivation Intervention to Quit          Pt quit smoking in 1972. No education given.

## 2017-10-17 NOTE — PT/OT/SLP EVAL
"Physical Therapy  Evaluation    Shara Rose   MRN: [id number]   Admitting Diagnosis: Neutropenic fever    PT Received On: 10/17/17  PT Start Time: 0840     PT Stop Time: 0852    PT Total Time (min): 12 min       Billable Minutes:  Evaluation  12 minutes    Diagnosis: Neutropenic fever    Past Medical History:   Diagnosis Date    Allergy     Anemia     Arthritis     Cataract     CKD (chronic kidney disease)     Gout, unspecified     Hypertension     Hypothyroidism     MDS (myelodysplastic syndrome) 8/14/2017    Menopause     PNA (pneumonia) 10/15/2017    Sickle cell trait     Trigger finger       Past Surgical History:   Procedure Laterality Date    APPENDECTOMY      HEMORRHOID SURGERY      HYSTERECTOMY         Referring physician: MARK Dove  Date referred to PT: 10/16/2017    General Precautions: Standard, fall  Orthopedic Precautions: N/A   Braces: N/A       Do you have any cultural, spiritual, Nondenominational conflicts, given your current situation?: None stated     Patient History:  Lives With: spouse  Living Arrangements: house  Home Accessibility:  (ramp to enter)  Living Environment Comment: Pt reports living with  in [facility] with ramp to enter. PTA, pt was (I) with mobility, ADLs, and driving. Pt helps care for her . No falls reported.   Equipment Currently Used at Home: none    Previous Level of Function:  Ambulation Skills: independent  Transfer Skills: independent  ADL Skills: independent  Work/Leisure Activity: independent    Subjective:  Communicated with RN prior to session.  "My neck is stiff. I am a little off balance"   Chief Complaint: stiffness   Patient goals: return home     Pain/Comfort--> pt reported she was unble to quantify but indicated she was previously in "a lot of pain but it's decreasing" in neck/head  Pain Rating 1:  (did not provide numerical value but reports mild stiffness in neck )  Pain Rating Post-Intervention 1:  (See comment above)      Objective: "   Patient found with: peripheral IV     Cognitive Exam:  Oriented to: Person, Place, Time and Situation    Follows Commands/attention: Follows multistep  commands  Communication: clear/fluent  Safety awareness/insight to disability: intact    Physical Exam:  Postural examination/scapula alignment: Rounded shoulder    Skin integrity: Visible skin intact  Edema: None noted in BLE    Sensation:   Intact    Lower Extremity Range of Motion:  Right Lower Extremity: WFL  Left Lower Extremity: WFL    Lower Extremity Strength:  Right Lower Extremity: WFL  Left Lower Extremity: WFL     Fine motor coordination:  Intact    Gross motor coordination: WFL    Functional Mobility:  Bed Mobility:  Rolling/Turning to Left: Independent  Scooting/Bridging: Independent  Supine to Sit: Independent  Sit to Supine:  (Pt UIC)    Transfers:  Sit <> Stand Assistance: Modified Independent  Sit <> Stand Assistive Device: No Assistive Device  Bed <> Chair Technique: Stand Pivot  Bed <> Chair Assistance: Stand By Assistance  Bed <> Chair Assistive Device: No Assistive Device    Gait:   Gait Distance: 200' . Pt completed head turns R/L up/down with mild instability- able to self correct   Assistance 1: Stand by Assistance  Gait Assistive Device: No device  Gait Pattern: reciprocal  Gait Deviation(s): decreased sharron, decreased step length, decreased stride length    Stairs: no stairs present at home    Balance:   Static Sit: GOOD: Takes MODERATE challenges from all directions  Dynamic Sit: GOOD: Maintains balance through MODERATE excursions of active trunk movement  Static Stand: GOOD: Takes MODERATE challenges from all directions  Dynamic stand: GOOD: Needs SUPERVISION only during gait and able to self right with moderate     Therapeutic Activities and Exercises:  Pt educated on role of PT and POC/goals for therapy as well as safety with mobility. Pt verbalized understanding. Pt expressed no further concerns/questions.   · Pt educated pt on incr  OOB activity including sitting in bedside chair majority of day and amb 2x/day with nsg and PCT.   · **Mobility Technician appropriate to perform: out of bed, up to chair, back to bed, bed exercises, PROM, ambulation in room, ambulation in hallway.  · Updated white board with appropriate PT information; notified nsg     AM-PAC 6 CLICK MOBILITY  How much help from another person does this patient currently need?   1 = Unable, Total/Dependent Assistance  2 = A lot, Maximum/Moderate Assistance  3 = A little, Minimum/Contact Guard/Supervision  4 = None, Modified [place]/Independent    Turning over in bed (including adjusting bedclothes, sheets and blankets)?: 4  Sitting down on and standing up from a chair with arms (e.g., wheelchair, bedside commode, etc.): 4  Moving from lying on back to sitting on the side of the bed?: 4  Moving to and from a bed to a chair (including a wheelchair)?: 4  Need to walk in hospital room?: 3  Climbing 3-5 steps with a railing?: 3  Total Score: 22     AM-PAC Raw Score CMS G-Code Modifier Level of Impairment Assistance   6 % Total / Unable   7 - 9 CM 80 - 100% Maximal Assist   10 - 14 CL 60 - 80% Moderate Assist   15 - 19 CK 40 - 60% Moderate Assist   20 - 22 CJ 20 - 40% Minimal Assist   23 CI 1-20% SBA / CGA   24 CH 0% Independent/ Mod I     Patient left up in chair with all lines intact, call button in reach and RN notified.    Assessment:   Shara Rose is a 82 y.o. female with a medical diagnosis of Neutropenic fever. Upon initial PT evaluation, pt presents with gait instability and imbalance. Pt would benefit from skilled PT services to address these deficits and improve return to PLOF. Anticipate d/c to home.    Rehab identified problem list/impairments: Rehab identified problem list/impairments: gait instability, impaired balance    Rehab potential is good.    Activity tolerance: Good    Discharge recommendations: Discharge Facility/Level Of Care Needs: home      Barriers to discharge: Barriers to Discharge: Decreased caregiver support    Equipment recommendations: Equipment Needed After Discharge: none     GOALS:    Physical Therapy Goals        Problem: Physical Therapy Goal    Goal Priority Disciplines Outcome Goal Variances Interventions   Physical Therapy Goal     PT/OT, PT Ongoing (interventions implemented as appropriate)     Description:  Goals to be met by: 17     Patient will increase functional independence with mobility by performin. Sit to stand transfer with [place]  2. Bed to chair transfer with [place]   3. Gait  x 300 feet with Supervision with no LOB while completing start/stop activities and head turns                       PLAN:    Patient to be seen 2 x/week to address the above listed problems via gait training, therapeutic activities, therapeutic exercises  Plan of Care expires: 17  Plan of Care reviewed with: patient      Eliza Dumont, PT  10/17/2017

## 2017-10-17 NOTE — PT/OT/SLP EVAL
"Occupational Therapy  Evaluation    Shara Rose   MRN: [id number]   Admitting Diagnosis: Neutropenic fever    OT Date of Treatment: 10/17/17   OT Start Time: 0839  OT Stop Time: 0851  OT Total Time (min): 12 min    Billable Minutes:  Evaluation 12    Diagnosis: Neutropenic fever     Past Medical History:   Diagnosis Date    Allergy     Anemia     Arthritis     Cataract     CKD (chronic kidney disease)     Gout, unspecified     Hypertension     Hypothyroidism     MDS (myelodysplastic syndrome) 8/14/2017    Menopause     PNA (pneumonia) 10/15/2017    Sickle cell trait     Trigger finger       Past Surgical History:   Procedure Laterality Date    APPENDECTOMY      HEMORRHOID SURGERY      HYSTERECTOMY         Referring physician: MARK Dove  Date referred to OT: 10/16/2017    General Precautions: Standard, fall  Orthopedic Precautions:    Braces:      Do you have any cultural, spiritual, Buddhism conflicts, given your current situation?: no     Patient History:  Living Environment  Living Environment Comment: Pt lives with spouse in [facility] with ramp access. Pt was (I) with ADLs and functional mobility PTA. Pt reported she cares for her spouse. Pt does not work, does drive and will have some A upon discharge.  Equipment Currently Used at Home: none      Dominant hand: right    Subjective:  Communicated with RN prior to session.  "I took my socks off. I'm hot."    Chief Complaint: feeling hot, stiff neck  Patient/Family stated goals: go home    Pain/Comfort  Pain Rating 1: other (see comments) (pt reported she was unble to quantify but indicated she was previously in "a lot of pain but it's decreasing" in neck/head)  Pain Addressed 1: Reposition, Distraction, Nurse notified  Pain Rating Post-Intervention 1:  (no change in pain reported during or after session)    Objective:  Patient found with: peripheral IV    Cognitive Exam:  Oriented to: Person, Place, Time and Situation  Follows Commands/attention: " Follows multistep  commands  Communication: clear/fluent  Memory:  No Deficits noted  Safety awareness/insight to disability: intact  Coping skills/emotional control: Appropriate to situation    Visual/perceptual:  Intact    Physical Exam:  Postural examination/scapula alignment: Rounded shoulder  Skin integrity: Visible skin intact  Edema: None noted     Sensation:   Intact    Upper Extremity Range of Motion:  Right Upper Extremity: WFL  Left Upper Extremity: WFL    Upper Extremity Strength:  Right Upper Extremity: WFL  Left Upper Extremity: WFL   Strength: WFL    Fine motor coordination:   Intact    Gross motor coordination: WFL    Functional Mobility:  Bed Mobility:   Supine>Sit: mod (I) with HOB elevated and hand rail for support   Scooting/Bridging: Mod (I) with hand rail    Transfers:   Sit>Stand: Mod (I) from EOB with hand rail for support   Stand>Sit: Mod (I) to chair with hand rail for support    Pt performed functional mobility ~200ft with SBA and no AD. Pt slightly unsteady during mobiltiy ax. Refer to PT note for further gait analysis.      Activities of Daily Living:  UE Dressing: Minimal Assistance don gown like robe with A to avoid IV line.  LE Dressing: [place] don socks in long sit in bed.       Balance:  Static Sitting:           good  Dynamic Sitting:      good  Static Standing:       good  Dynamic Standing:  fair+    Therapeutic Activities and Exercises:  Pt educated on safety with daily tasks OOB, and importance of participating in daily ax. Pt whiteboard updated.      AM-PAC 6 CLICK ADL  How much help from another person does this patient currently need?  1 = Unable, Total/Dependent Assistance  2 = A lot, Maximum/Moderate Assistance  3 = A little, Minimum/Contact Guard/Supervision  4 = None, Modified [place]/Independent    Putting on and taking off regular lower body clothing? : 4  Bathing (including washing, rinsing, drying)?: 3  Toileting, which includes using toilet, bedpan, or  "urinal? : 3  Putting on and taking off regular upper body clothing?: 3  Taking care of personal grooming such as brushing teeth?: 3  Eating meals?: 4  Total Score: 20    AM-PAC Raw Score CMS "G-Code Modifier Level of Impairment Assistance   6 % Total / Unable   7 - 9 CM 80 - 100% Maximal Assist   10-14 CL 60 - 80% Moderate Assist   15 - 19 CK 40 - 60% Moderate Assist   20 - 22 CJ 20 - 40% Minimal Assist   23 CI 1-20% SBA / CGA   24 CH 0% Independent/ Mod I       Patient left up in chair with all lines intact, call button in reach and RN notified    Assessment:  Shara Rose is a 82 y.o. female with a medical diagnosis of Neutropenic fever. Pt tolerated session well and put forth good effort to participate. Pt presented near functional baseline with minimal deficits noted in overall  (I), endurance, stability and safety for ADLs, self-care and functional mobility. Pt demonstrated slightly reduced OOB ax stability for daily tasks and will benefit from a few OT sessions in order to ensure maximal (I) and safety for functional tasks.        Rehab identified problem list/impairments: Rehab identified problem list/impairments: impaired functional mobilty, gait instability, impaired endurance, impaired balance, impaired self care skills, pain (Simultaneous filing. User may not have seen previous data.)    Rehab potential is all lines intact, call button in reach and RN notified.    Activity tolerance: Good    Discharge recommendations: Discharge Facility/Level Of Care Needs: home (Simultaneous filing. User may not have seen previous data.)     Barriers to discharge:      Equipment recommendations: none (Simultaneous filing. User may not have seen previous data.)     GOALS:    Occupational Therapy Goals        Problem: Occupational Therapy Goal    Goal Priority Disciplines Outcome Interventions   Occupational Therapy Goal     OT, PT/OT     Description:  Goals to be met by:  2 Weeks (10/31/2017)    Patient will " increase functional independence with ADLs by performin. Supine to sit with [place].  2. Sit to Stand transfers with [place].   3. Toilet transfer to toilet with [place].  4. Grooming while standing with [place].  5. UE Dressing with [place].  6. Pt will perform functional reaching ax 1 set x 15 reps in all planes per UE standing with Sup and less than 3 LOBs.                        PLAN:  Patient to be seen 2 x/week to address the above listed problems via self-care/home management, community/work re-entry, therapeutic activities, therapeutic exercises  Plan of Care expires: 17  Plan of Care reviewed with: patient (Simultaneous filing. User may not have seen previous data.)         DAMIEN Sarmiento  10/17/2017

## 2017-10-17 NOTE — PLAN OF CARE
Problem: Patient Care Overview  Goal: Plan of Care Review  Outcome: Ongoing (interventions implemented as appropriate)  Patient is progressing and involved with plan of care. Frequent rounds made to assess pain and safety. Pt communicating needs throughout shift. Up with stand by assist to BSC. Poor appetite, voiding without difficulty although she has low UO. Pt c/o of LUQ pain with inhalation. Pain medication offered but refused. Cefepime and IVF continued as ordered. US w/ contrast completed. Pt noted to be hypertensive as well. MD aware.   All other vitals stable; no acute events this shift. Pt. Remaining free from falls or injury throughout shift; bed in lowest position; side rails up X2; call light within reach; pt instructed to call for assistance as needed - verbalized understanding. Q2h rounding on patient. Will continue to monitor.

## 2017-10-17 NOTE — SUBJECTIVE & OBJECTIVE
Subjective:     Interval History:     Afebrile. BP elevated. cx remain negative CT chest with evidence of PNA. 1 u prbcs today.    Objective:     Vital Signs (Most Recent):  Temp: 98.2 °F (36.8 °C) (10/17/17 0741)  Pulse: 71 (10/17/17 0741)  Resp: 18 (10/17/17 0741)  BP: (!) 170/74 (10/17/17 0741)  SpO2: 99 % (10/17/17 0741) Vital Signs (24h Range):  Temp:  [97.4 °F (36.3 °C)-99 °F (37.2 °C)] 98.2 °F (36.8 °C)  Pulse:  [68-80] 71  Resp:  [16-18] 18  SpO2:  [92 %-100 %] 99 %  BP: (150-172)/(65-74) 170/74     Weight: 55.4 kg (122 lb 2.2 oz)  Body mass index is 20.32 kg/m².  Body surface area is 1.59 meters squared.    ECOG SCORE         [unfilled]    Intake/Output - Last 3 Shifts       10/15 0700 - 10/16 0659 10/16 0700 - 10/17 0659 10/17 0700 - 10/18 0659    P.O. 640 50     Blood 300      IV Piggyback 100 50     Total Intake(mL/kg) 1040 (18.8) 100 (1.8)     Urine (mL/kg/hr) 1000 (0.8) 250 (0.2) 350 (1.5)    Stool 0 (0)      Total Output 1000 250 350    Net +40 -150 -350           Urine Occurrence 1 x      Stool Occurrence 1 x            Physical Exam   Constitutional: She is oriented to person, place, and time. She appears well-developed and well-nourished. No distress.   HENT:   Head: Normocephalic and atraumatic.   Eyes: EOM are normal. Pupils are equal, round, and reactive to light.   Neck: Normal range of motion. No thyromegaly present.   Cardiovascular: Normal rate and regular rhythm.    No murmur heard.  Pulmonary/Chest: Effort normal. No respiratory distress.   Bibasilar crackles   Abdominal: Soft. She exhibits no distension. There is no tenderness.   Musculoskeletal: Normal range of motion. She exhibits no edema.   Neurological: She is alert and oriented to person, place, and time. No cranial nerve deficit.   Skin: Skin is warm and dry. No rash noted. No erythema.   Psychiatric: She has a normal mood and affect. Her behavior is normal.       Significant Labs:   CBC:   Recent Labs  Lab 10/16/17  0830  10/17/17  0514   WBC 1.00* 1.08*   HGB 7.1* 6.6*   HCT 20.7* 19.6*   PLT 38*  --    , CMP:   Recent Labs  Lab 10/16/17  0853 10/17/17  0514    138   K 3.9 3.8    108   CO2 24 24   GLU 94 98   BUN 15 13   CREATININE 1.1 1.0   CALCIUM 9.2 8.7   PROT 8.0 7.4   ALBUMIN 2.5* 2.3*   BILITOT 1.1* 0.5   ALKPHOS 74 72   AST 18 15   ALT 10 8*   ANIONGAP 7* 6*   EGFRNONAA 47.2* 53.0*   , LDH: No results for input(s): LDHCSF, BFSOURCE in the last 48 hours. and LFTs:   Recent Labs  Lab 10/16/17  0853 10/17/17  0514   ALT 10 8*   AST 18 15   ALKPHOS 74 72   BILITOT 1.1* 0.5   PROT 8.0 7.4   ALBUMIN 2.5* 2.3*

## 2017-10-17 NOTE — PLAN OF CARE
Problem: Patient Care Overview  Goal: Plan of Care Review  Outcome: Ongoing (interventions implemented as appropriate)  Patient remains free from falls and injury this shift. Bed in low, locked position with call bell in reach. Family at bedside. Patient encouraged to call for assistance when getting out of bed. Patient verbalized understanding. Pt on IV cefepime. Pt afebrile throughout shift. 1 u blood given w premeds, no reaction. All belongings within reach will continue to monitor.

## 2017-10-17 NOTE — PLAN OF CARE
Problem: Physical Therapy Goal  Goal: Physical Therapy Goal  Goals to be met by: 17     Patient will increase functional independence with mobility by performin. Sit to stand transfer with [place]  2. Bed to chair transfer with [place]   3. Gait  x 300 feet with [place] with no LOB while completing start/stop activities and head turns     Outcome: Ongoing (interventions implemented as appropriate)  Upon initial PT evaluation, pt presents with gait instability and imbalance. Pt would benefit from skilled PT services to address these deficits and improve return to PLOF. Anticipate d/c to home.    Eliza Dumont DPT, PT  10/17/2017

## 2017-10-17 NOTE — PLAN OF CARE
Problem: Occupational Therapy Goal  Goal: Occupational Therapy Goal  Goals to be met by:  2 Weeks (10/31/2017)    Patient will increase functional independence with ADLs by performin. Supine to sit with [place].  2. Sit to Stand transfers with [place].   3. Toilet transfer to toilet with [place].  4. Grooming while standing with [place].  5. UE Dressing with [place].  6. Pt will perform functional reaching ax 1 set x 15 reps in all planes per UE standing with Sup and less than 3 LOBs.      OT maia completed and goals set.  DAMIEN Sarmiento  10/17/2017

## 2017-10-17 NOTE — ASSESSMENT & PLAN NOTE
"- CTA from [facility] (image above) with "masslike consolidation in the left upper lobe, suspicious for pneumonia in appropriate clinical setting ". Otherwise, there is concern for neoplasm  - CT chest 10/17 with evidence more supporting PNA  - as above  "

## 2017-10-17 NOTE — ASSESSMENT & PLAN NOTE
- likely source is PNA, ANC <500  - IV cefepime renally dose  - aspergillus antigen and fungitell - pending  - fungal ppx

## 2017-10-17 NOTE — PROGRESS NOTES
[facility]  Hematology  Bone Marrow Transplant  Progress Note    Patient Name: Shara Rose  Admission Date: 10/15/2017  Hospital Length of Stay: 2 days  Code Status: Full Code    Subjective:     Interval History:     Afebrile. BP elevated. cx remain negative CT chest with evidence of PNA. 1 u prbcs today.    Objective:     Vital Signs (Most Recent):  Temp: 98.2 °F (36.8 °C) (10/17/17 0741)  Pulse: 71 (10/17/17 0741)  Resp: 18 (10/17/17 0741)  BP: (!) 170/74 (10/17/17 0741)  SpO2: 99 % (10/17/17 0741) Vital Signs (24h Range):  Temp:  [97.4 °F (36.3 °C)-99 °F (37.2 °C)] 98.2 °F (36.8 °C)  Pulse:  [68-80] 71  Resp:  [16-18] 18  SpO2:  [92 %-100 %] 99 %  BP: (150-172)/(65-74) 170/74     Weight: 55.4 kg (122 lb 2.2 oz)  Body mass index is 20.32 kg/m².  Body surface area is 1.59 meters squared.    ECOG SCORE         [unfilled]    Intake/Output - Last 3 Shifts       10/15 0700 - 10/16 0659 10/16 0700 - 10/17 0659 10/17 0700 - 10/18 0659    P.O. 640 50     Blood 300      IV Piggyback 100 50     Total Intake(mL/kg) 1040 (18.8) 100 (1.8)     Urine (mL/kg/hr) 1000 (0.8) 250 (0.2) 350 (1.5)    Stool 0 (0)      Total Output 1000 250 350    Net +40 -150 -350           Urine Occurrence 1 x      Stool Occurrence 1 x            Physical Exam   Constitutional: She is oriented to person, place, and time. She appears well-developed and well-nourished. No distress.   HENT:   Head: Normocephalic and atraumatic.   Eyes: EOM are normal. Pupils are equal, round, and reactive to light.   Neck: Normal range of motion. No thyromegaly present.   Cardiovascular: Normal rate and regular rhythm.    No murmur heard.  Pulmonary/Chest: Effort normal. No respiratory distress.   Bibasilar crackles   Abdominal: Soft. She exhibits no distension. There is no tenderness.   Musculoskeletal: Normal range of motion. She exhibits no edema.   Neurological: She is alert and oriented to person, place, and time. No cranial nerve deficit.  "  Skin: Skin is warm and dry. No rash noted. No erythema.   Psychiatric: She has a normal mood and affect. Her behavior is normal.       Significant Labs:   CBC:   Recent Labs  Lab 10/16/17  0853 10/17/17  0514   WBC 1.00* 1.08*   HGB 7.1* 6.6*   HCT 20.7* 19.6*   PLT 38*  --    , CMP:   Recent Labs  Lab 10/16/17  0853 10/17/17  0514    138   K 3.9 3.8    108   CO2 24 24   GLU 94 98   BUN 15 13   CREATININE 1.1 1.0   CALCIUM 9.2 8.7   PROT 8.0 7.4   ALBUMIN 2.5* 2.3*   BILITOT 1.1* 0.5   ALKPHOS 74 72   AST 18 15   ALT 10 8*   ANIONGAP 7* 6*   EGFRNONAA 47.2* 53.0*   , LDH: No results for input(s): LDHCSF, BFSOURCE in the last 48 hours. and LFTs:   Recent Labs  Lab 10/16/17  0853 10/17/17  0514   ALT 10 8*   AST 18 15   ALKPHOS 74 72   BILITOT 1.1* 0.5   PROT 8.0 7.4   ALBUMIN 2.5* 2.3*     Assessment/Plan:     * Neutropenic fever    - likely source is PNA, ANC <500  - IV cefepime renally dose  - aspergillus antigen and fungitell - pending  - fungal ppx        MDS (myelodysplastic syndrome)    -Referred in  July 2017 for a Hematology consultation for further evaluation of anemia. 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 Not a stem cell transplant candidate due to age.   -Started on Vidaza on 8/22 and received it 9/26.  - acyclovir and fluconazole ppx        PNA (pneumonia)    - see "neutropenic fever"        Lung consolidation    - CTA from [facility] (image above) with "masslike consolidation in the left upper lobe, suspicious for pneumonia in appropriate clinical setting ". Otherwise, there is concern for neoplasm  - CT chest 10/17 with evidence more supporting PNA  - as above        Pancytopenia    - likely 2/2 chemo and MDS  - chronic neutropenia with ANC <500  - transfuse for hbg < 7 and plts <10        Stage 2 chronic kidney disease    - Cr of 1.1-1.2, baseline  - monitor renal function and renally dose meds        Hypertension "    - restart valsartan (home med) 80mg daily        Debility    - PT/OT        Hypothyroidism    - cont home synthroid  - pending TSH            VTE Risk Mitigation         Ordered     Medium Risk of VTE  Once      10/15/17 0234     Place sequential compression device  Until discontinued      10/15/17 0234          Disposition: clinically improving today significantly since admit. Will likely de-escalate to PO abx tomorrow. Anticipating d/c soon    Coby Dove MD  Bone Marrow Transplant  [facility]

## 2017-10-18 LAB
1,3 BETA GLUCAN SPEC-MCNC: 31 PG/ML
ABO + RH BLD: NORMAL
ALBUMIN SERPL BCP-MCNC: 2.5 G/DL
ALP SERPL-CCNC: 78 U/L
ALT SERPL W/O P-5'-P-CCNC: 6 U/L
ANION GAP SERPL CALC-SCNC: 6 MMOL/L
AST SERPL-CCNC: 14 U/L
BASOPHILS # BLD AUTO: 0 K/UL
BASOPHILS NFR BLD: 0 %
BILIRUB SERPL-MCNC: 1 MG/DL
BLD GP AB SCN CELLS X3 SERPL QL: NORMAL
BUN SERPL-MCNC: 12 MG/DL
CALCIUM SERPL-MCNC: 9.3 MG/DL
CHLORIDE SERPL-SCNC: 108 MMOL/L
CO2 SERPL-SCNC: 25 MMOL/L
CREAT SERPL-MCNC: 1 MG/DL
DIFFERENTIAL METHOD: ABNORMAL
EOSINOPHIL # BLD AUTO: 0.1 K/UL
EOSINOPHIL NFR BLD: 10.4 %
ERYTHROCYTE [DISTWIDTH] IN BLOOD BY AUTOMATED COUNT: 14.2 %
EST. GFR  (AFRICAN AMERICAN): >60 ML/MIN/1.73 M^2
EST. GFR  (NON AFRICAN AMERICAN): 53 ML/MIN/1.73 M^2
GLUCOSE SERPL-MCNC: 93 MG/DL
HCT VFR BLD AUTO: 21.3 %
HGB BLD-MCNC: 7.2 G/DL
IMM GRANULOCYTES # BLD AUTO: 0 K/UL
IMM GRANULOCYTES NFR BLD AUTO: 0 %
LYMPHOCYTES # BLD AUTO: 0.7 K/UL
LYMPHOCYTES NFR BLD: 65.1 %
MCH RBC QN AUTO: 29.4 PG
MCHC RBC AUTO-ENTMCNC: 33.8 G/DL
MCV RBC AUTO: 87 FL
MONOCYTES # BLD AUTO: 0 K/UL
MONOCYTES NFR BLD: 2.8 %
NEUTROPHILS # BLD AUTO: 0.2 K/UL
NEUTROPHILS NFR BLD: 21.7 %
NRBC BLD-RTO: 0 /100 WBC
PLATELET # BLD AUTO: 24 K/UL
PMV BLD AUTO: 12.2 FL
POTASSIUM SERPL-SCNC: 3.5 MMOL/L
PROT SERPL-MCNC: 7.8 G/DL
RBC # BLD AUTO: 2.45 M/UL
SODIUM SERPL-SCNC: 139 MMOL/L
WBC # BLD AUTO: 1.06 K/UL

## 2017-10-18 PROCEDURE — 25000003 PHARM REV CODE 250: Performed by: INTERNAL MEDICINE

## 2017-10-18 PROCEDURE — 27201040 HC RC 50 FILTER

## 2017-10-18 PROCEDURE — 97535 SELF CARE MNGMENT TRAINING: CPT

## 2017-10-18 PROCEDURE — 63600175 PHARM REV CODE 636 W HCPCS: Performed by: HOSPITALIST

## 2017-10-18 PROCEDURE — 25000003 PHARM REV CODE 250: Performed by: HOSPITALIST

## 2017-10-18 PROCEDURE — 86920 COMPATIBILITY TEST SPIN: CPT

## 2017-10-18 PROCEDURE — 97530 THERAPEUTIC ACTIVITIES: CPT

## 2017-10-18 PROCEDURE — 80053 COMPREHEN METABOLIC PANEL: CPT

## 2017-10-18 PROCEDURE — 86900 BLOOD TYPING SEROLOGIC ABO: CPT

## 2017-10-18 PROCEDURE — 36415 COLL VENOUS BLD VENIPUNCTURE: CPT

## 2017-10-18 PROCEDURE — 85025 COMPLETE CBC W/AUTO DIFF WBC: CPT

## 2017-10-18 PROCEDURE — 99232 SBSQ HOSP IP/OBS MODERATE 35: CPT | Mod: ,,, | Performed by: INTERNAL MEDICINE

## 2017-10-18 PROCEDURE — 86901 BLOOD TYPING SEROLOGIC RH(D): CPT

## 2017-10-18 PROCEDURE — 20600001 HC STEP DOWN PRIVATE ROOM

## 2017-10-18 RX ORDER — VERAPAMIL HYDROCHLORIDE 120 MG/1
120 TABLET, FILM COATED, EXTENDED RELEASE ORAL NIGHTLY
Status: DISCONTINUED | OUTPATIENT
Start: 2017-10-18 | End: 2017-10-19 | Stop reason: HOSPADM

## 2017-10-18 RX ORDER — HYDROCODONE BITARTRATE AND ACETAMINOPHEN 500; 5 MG/1; MG/1
TABLET ORAL
Status: DISCONTINUED | OUTPATIENT
Start: 2017-10-18 | End: 2017-10-18

## 2017-10-18 RX ADMIN — ACYCLOVIR 400 MG: 200 CAPSULE ORAL at 08:10

## 2017-10-18 RX ADMIN — LEVOTHYROXINE SODIUM 25 MCG: 25 TABLET ORAL at 06:10

## 2017-10-18 RX ADMIN — CEFEPIME HYDROCHLORIDE 2 G: 2 INJECTION, SOLUTION INTRAVENOUS at 02:10

## 2017-10-18 RX ADMIN — FLUCONAZOLE 200 MG: 200 TABLET ORAL at 08:10

## 2017-10-18 RX ADMIN — VALSARTAN 80 MG: 40 TABLET ORAL at 08:10

## 2017-10-18 RX ADMIN — HYDROCODONE BITARTRATE AND ACETAMINOPHEN 1 TABLET: 5; 325 TABLET ORAL at 08:10

## 2017-10-18 RX ADMIN — VERAPAMIL HYDROCHLORIDE 120 MG: 120 TABLET, FILM COATED, EXTENDED RELEASE ORAL at 08:10

## 2017-10-18 RX ADMIN — CEFEPIME HYDROCHLORIDE 2 G: 2 INJECTION, SOLUTION INTRAVENOUS at 01:10

## 2017-10-18 NOTE — PROGRESS NOTES
[facility]  Hematology  Bone Marrow Transplant  Progress Note    Patient Name: Shara Rose  Admission Date: 10/15/2017  Hospital Length of Stay: 3 days  Code Status: Full Code    Subjective:     Interval History: Pt reports feeling well today.  Up an out of bed.  Sob improved.  Denies ha, cp, abd pain, n/v.    Objective:     Vital Signs (Most Recent):  Temp: 97.7 °F (36.5 °C) (10/18/17 1104)  Pulse: 62 (10/18/17 1104)  Resp: 20 (10/18/17 1104)  BP: (!) 152/70 (10/18/17 1104)  SpO2: (!) 94 % (10/18/17 1104) Vital Signs (24h Range):  Temp:  [97.7 °F (36.5 °C)-99.8 °F (37.7 °C)] 97.7 °F (36.5 °C)  Pulse:  [61-76] 62  Resp:  [16-20] 20  SpO2:  [94 %-100 %] 94 %  BP: (150-192)/(68-84) 152/70     Weight: 56.6 kg (124 lb 12.5 oz)  Body mass index is 20.76 kg/m².  Body surface area is 1.61 meters squared.    ECOG SCORE         [unfilled]    Intake/Output - Last 3 Shifts       10/16 0700 - 10/17 0659 10/17 0700 - 10/18 0659 10/18 0700 - 10/19 0659    P.O. 50 125 240    Blood  251.3     IV Piggyback 50      Total Intake(mL/kg) 100 (1.8) 376.3 (6.6) 240 (4.2)    Urine (mL/kg/hr) 250 (0.2) 350 (0.3)     Stool       Total Output 250 350      Net -150 +26.3 +240           Urine Occurrence   2 x    Stool Occurrence  0 x 0 x          Physical Exam   Constitutional: She is oriented to person, place, and time. She appears well-developed and well-nourished. No distress.   HENT:   Head: Normocephalic and atraumatic.   Eyes: EOM are normal. Pupils are equal, round, and reactive to light.   Neck: Normal range of motion. No thyromegaly present.   Cardiovascular: Normal rate and regular rhythm.    No murmur heard.  Pulmonary/Chest: Effort normal. No respiratory distress.   Bibasilar crackles   Abdominal: Soft. She exhibits no distension. There is no tenderness.   Musculoskeletal: Normal range of motion. She exhibits no edema.   Neurological: She is alert and oriented to person, place, and time. No cranial nerve  "deficit.   Skin: Skin is warm and dry. No rash noted. No erythema.   Psychiatric: She has a normal mood and affect. Her behavior is normal.       Significant Labs:   CBC:   Recent Labs  Lab 10/17/17  0514 10/18/17  0538   WBC 1.08* 1.06*   HGB 6.6* 7.2*   HCT 19.6* 21.3*   PLT 29* 24*    and CMP:   Recent Labs  Lab 10/17/17  0514 10/18/17  0538    139   K 3.8 3.5    108   CO2 24 25   GLU 98 93   BUN 13 12   CREATININE 1.0 1.0   CALCIUM 8.7 9.3   PROT 7.4 7.8   ALBUMIN 2.3* 2.5*   BILITOT 0.5 1.0   ALKPHOS 72 78   AST 15 14   ALT 8* 6*   ANIONGAP 6* 6*   EGFRNONAA 53.0* 53.0*       Diagnostic Results:  None    Assessment/Plan:     * Neutropenic fever    - likely source is PNA, decrease ANC  - IV cefepime renally dose  - aspergillus antigen anegative.  fungitell - pending  - fungal ppx  -if continuing to do well, will transition to oral abx tomorrow and plan discharge.         MDS (myelodysplastic syndrome)    -Referred in  July 2017 for a Hematology consultation for further evaluation of anemia. 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 Not a stem cell transplant candidate due to age.   -Started on Vidaza on 8/22 and received it 9/26.  - acyclovir and fluconazole ppx        Debility    - PT/OT        Lung consolidation    - CTA from [facility] (image above) with "masslike consolidation in the left upper lobe, suspicious for pneumonia in appropriate clinical setting ". Otherwise, there is concern for neoplasm  - CT chest 10/17 with evidence more supporting PNA  - as above        PNA (pneumonia)    - see "neutropenic fever"        Stage 2 chronic kidney disease    - Cr near baseline  - monitor renal function and renally dose meds        Hypothyroidism    - cont home synthroid  - TSH wnl        Hypertension    - cont valsartan (home med) 80mg daily        Pancytopenia    - likely 2/2 chemo and MDS  - transfuse for hbg < 7 and plts <10      "       VTE Risk Mitigation         Ordered     Medium Risk of VTE  Once      10/15/17 0234     Place sequential compression device  Until discontinued      10/15/17 0234          Disposition: Planned discharge tomorrow     Ye Soni MD  Bone Marrow Transplant  [facility]

## 2017-10-18 NOTE — ASSESSMENT & PLAN NOTE
"- CTA from [facility] (image above) with "masslike consolidation in the left upper lobe, suspicious for pneumonia in appropriate clinical setting ". Otherwise, there is concern for neoplasm  - CT chest 10/17 with evidence more supporting PNA  - as above  "

## 2017-10-18 NOTE — ASSESSMENT & PLAN NOTE
- likely source is PNA, decrease ANC  - IV cefepime renally dose  - aspergillus antigen anegative.  fungitell - pending  - fungal ppx  -if continuing to do well, will transition to oral abx tomorrow and plan discharge.

## 2017-10-18 NOTE — PLAN OF CARE
Problem: Patient Care Overview  Goal: Plan of Care Review  Outcome: Ongoing (interventions implemented as appropriate)  Patient AAOx4, VSS, afebrile, and without injury. Fall precautions maintained. Patient instructed on how to contact the nurse. Patient received IV cefepime today. Patient received PRN pain medication this morning for a headache; medicine provided relief. Patient is on regular/neutropenic diet with good appetite. Patient without questions or complaints at this time, will continue to monitor.

## 2017-10-18 NOTE — SUBJECTIVE & OBJECTIVE
Subjective:     Interval History: Pt reports feeling well today.  Up an out of bed.  Sob improved.  Denies ha, cp, abd pain, n/v.    Objective:     Vital Signs (Most Recent):  Temp: 97.7 °F (36.5 °C) (10/18/17 1104)  Pulse: 62 (10/18/17 1104)  Resp: 20 (10/18/17 1104)  BP: (!) 152/70 (10/18/17 1104)  SpO2: (!) 94 % (10/18/17 1104) Vital Signs (24h Range):  Temp:  [97.7 °F (36.5 °C)-99.8 °F (37.7 °C)] 97.7 °F (36.5 °C)  Pulse:  [61-76] 62  Resp:  [16-20] 20  SpO2:  [94 %-100 %] 94 %  BP: (150-192)/(68-84) 152/70     Weight: 56.6 kg (124 lb 12.5 oz)  Body mass index is 20.76 kg/m².  Body surface area is 1.61 meters squared.    ECOG SCORE         [unfilled]    Intake/Output - Last 3 Shifts       10/16 0700 - 10/17 0659 10/17 0700 - 10/18 0659 10/18 0700 - 10/19 0659    P.O. 50 125 240    Blood  251.3     IV Piggyback 50      Total Intake(mL/kg) 100 (1.8) 376.3 (6.6) 240 (4.2)    Urine (mL/kg/hr) 250 (0.2) 350 (0.3)     Stool       Total Output 250 350      Net -150 +26.3 +240           Urine Occurrence   2 x    Stool Occurrence  0 x 0 x          Physical Exam   Constitutional: She is oriented to person, place, and time. She appears well-developed and well-nourished. No distress.   HENT:   Head: Normocephalic and atraumatic.   Eyes: EOM are normal. Pupils are equal, round, and reactive to light.   Neck: Normal range of motion. No thyromegaly present.   Cardiovascular: Normal rate and regular rhythm.    No murmur heard.  Pulmonary/Chest: Effort normal. No respiratory distress.   Bibasilar crackles   Abdominal: Soft. She exhibits no distension. There is no tenderness.   Musculoskeletal: Normal range of motion. She exhibits no edema.   Neurological: She is alert and oriented to person, place, and time. No cranial nerve deficit.   Skin: Skin is warm and dry. No rash noted. No erythema.   Psychiatric: She has a normal mood and affect. Her behavior is normal.       Significant Labs:   CBC:   Recent Labs  Lab 10/17/17  9627  10/18/17  0538   WBC 1.08* 1.06*   HGB 6.6* 7.2*   HCT 19.6* 21.3*   PLT 29* 24*    and CMP:   Recent Labs  Lab 10/17/17  0514 10/18/17  0538    139   K 3.8 3.5    108   CO2 24 25   GLU 98 93   BUN 13 12   CREATININE 1.0 1.0   CALCIUM 8.7 9.3   PROT 7.4 7.8   ALBUMIN 2.3* 2.5*   BILITOT 0.5 1.0   ALKPHOS 72 78   AST 15 14   ALT 8* 6*   ANIONGAP 6* 6*   EGFRNONAA 53.0* 53.0*       Diagnostic Results:  None

## 2017-10-18 NOTE — PLAN OF CARE
Problem: Occupational Therapy Goal  Goal: Occupational Therapy Goal  Goals to be met by:  2 Weeks (10/31/2017)    Patient will increase functional independence with ADLs by performin. Supine to sit with [place].  2. Sit to Stand transfers with [place]. MET 10/18  3. Toilet transfer to toilet with [place].  4. Grooming while standing with [place].   5. UE Dressing with [place].  6. Pt will perform functional reaching ax 1 set x 15 reps in all planes per UE standing with Sup and less than 3 LOBs.       1 goal met. Other Goals remain appropriate.  DAMIEN Sarmiento  10/18/2017

## 2017-10-18 NOTE — PT/OT/SLP PROGRESS
"Occupational Therapy  Treatment    Shara Rose   MRN: [id number]   Admitting Diagnosis: Neutropenic fever    OT Date of Treatment: 10/18/17   OT Start Time: 1322  OT Stop Time: 1345  OT Total Time (min): 23 min    Billable Minutes:  Self Care/Home Management 13 and Therapeutic Activity 10    General Precautions: Standard, fall  Orthopedic Precautions:    Braces:      Do you have any cultural, spiritual, Adventist conflicts, given your current situation?: no    Subjective:  Communicated with RN prior to session.  "I am going to go to the bathroom." "I have always been unsteady on my feet." "I usually walk a lot faster than this. I am not a slow walker."  Pain/Comfort  Pain Rating 1: 0/10  Pain Addressed 1: Pre-medicate for activity  Pain Rating Post-Intervention 1: 0/10    Objective:        Functional Mobility:  Bed Mobility:   Supine>Sit: Mod (I) with HOB elevated   Scooting/Bridging: (I) to EOB    Transfers:   Sit>Stand: (I) from EOB   Stand>Sit: Mod (I) with arm rest at chair   Toilet: Mod (I) with grab bar for support    Pt performed functional mobility ~200ft with Sup<>SBA and no AD. Pt slightly unsteady throughout and experienced LOB x3 during functional mobility within room and landa but able to correct.      Activities of Daily Living:  Feeding: [place] manage utensils, containers and cups to eat lunch seated in chair.  LE Dressing: [place] doff/don socks seated in chair.  Grooming: Supervision to wash hands, brush teeth and wash face while standing at sink utilizing counter for support throughout tasks.  Toileting: [place] for carmen care/hygiene and clothing management from toilet      Balance:  Static Sitting:           good  Dynamic Sitting:      good  Static Standing:       good  Dynamic Standing:  fair+      Therapeutic Activities and Exercises:  Pt educated on safety with daily tasks OOB, and importance of participating in daily ax. Pt whiteboard updated.      AM-PAC 6 CLICK ADL   How " "much help from another person does this patient currently need?   1 = Unable, Total/Dependent Assistance  2 = A lot, Maximum/Moderate Assistance  3 = A little, Minimum/Contact Guard/Supervision  4 = None, Modified [place]/Independent    Putting on and taking off regular lower body clothing? : 4  Bathing (including washing, rinsing, drying)?: 3  Toileting, which includes using toilet, bedpan, or urinal? : 4  Putting on and taking off regular upper body clothing?: 3  Taking care of personal grooming such as brushing teeth?: 4  Eating meals?: 4  Total Score: 22     AM-PAC Raw Score CMS "G-Code Modifier Level of Impairment Assistance   6 % Total / Unable   7 - 8 CM 80 - 100% Maximal Assist   9-13 CL 60 - 80% Moderate Assist   14 - 19 CK 40 - 60% Moderate Assist   20 - 22 CJ 20 - 40% Minimal Assist   23 CI 1-20% SBA / CGA   24 CH 0% Independent/ Mod I       Patient left up in chair with all lines intact, call button in reach and RN] notified    ASSESSMENT:  Shara Rose is a 82 y.o. female with a medical diagnosis of Neutropenic fever. Pt tolerated session well and put forth good effort to participate. Pt presented with decreased (I), endurance, stability and safety for ADLs, self-care and functional mobility. Pt slightly unsteady with OOB ax and functional mobility this date. Pt will benefit from further OT in order to maximize (I) and safety for functional tasks.      Rehab identified problem list/impairments: Rehab identified problem list/impairments: weakness, impaired functional mobilty, gait instability, impaired endurance, impaired balance, impaired self care skills, decreased safety awareness    Rehab potential is good.    Activity tolerance: Good    Discharge recommendations: Discharge Facility/Level Of Care Needs: home     Barriers to discharge: Barriers to Discharge: Decreased caregiver support    Equipment recommendations: none     GOALS:    Occupational Therapy Goals        Problem: " Occupational Therapy Goal    Goal Priority Disciplines Outcome Interventions   Occupational Therapy Goal     OT, PT/OT     Description:  Goals to be met by:  2 Weeks (10/31/2017)    Patient will increase functional independence with ADLs by performin. Supine to sit with [place].  2. Sit to Stand transfers with [place]. MET 10/18  3. Toilet transfer to toilet with [place].  4. Grooming while standing with [place].   5. UE Dressing with [place].  6. Pt will perform functional reaching ax 1 set x 15 reps in all planes per UE standing with Sup and less than 3 LOBs.                         Plan:  Patient to be seen 2 x/week to address the above listed problems via self-care/home management, community/work re-entry, therapeutic activities, therapeutic exercises  Plan of Care expires: 17  Plan of Care reviewed with: patient         DAMIEN Sarmiento  10/18/2017

## 2017-10-18 NOTE — PROGRESS NOTES
10/17/17 1938   Vital Signs   Temp 98.6 °F (37 °C)   Temp src Oral   Pulse 68   Heart Rate Source Monitor   Resp 18   SpO2 100 %   O2 Device (Oxygen Therapy) room air   BP (!) 192/84   BP Location Left arm   BP Method Automatic   Patient Position Sitting   Notified Dr. Padilla of Bp, no new orders at this time.

## 2017-10-19 VITALS
WEIGHT: 124.75 LBS | RESPIRATION RATE: 16 BRPM | HEART RATE: 58 BPM | SYSTOLIC BLOOD PRESSURE: 162 MMHG | OXYGEN SATURATION: 100 % | HEIGHT: 65 IN | BODY MASS INDEX: 20.79 KG/M2 | DIASTOLIC BLOOD PRESSURE: 78 MMHG | TEMPERATURE: 98 F

## 2017-10-19 DIAGNOSIS — D46.9 MDS (MYELODYSPLASTIC SYNDROME): Primary | ICD-10-CM

## 2017-10-19 LAB
ALBUMIN SERPL BCP-MCNC: 2.4 G/DL
ALP SERPL-CCNC: 82 U/L
ALT SERPL W/O P-5'-P-CCNC: 6 U/L
ANION GAP SERPL CALC-SCNC: 8 MMOL/L
ANISOCYTOSIS BLD QL SMEAR: SLIGHT
AST SERPL-CCNC: 13 U/L
BASOPHILS # BLD AUTO: 0.01 K/UL
BASOPHILS NFR BLD: 0.8 %
BILIRUB SERPL-MCNC: 0.5 MG/DL
BLD PROD TYP BPU: NORMAL
BLOOD UNIT EXPIRATION DATE: NORMAL
BLOOD UNIT TYPE CODE: 8400
BLOOD UNIT TYPE: NORMAL
BUN SERPL-MCNC: 13 MG/DL
CALCIUM SERPL-MCNC: 9.6 MG/DL
CHLORIDE SERPL-SCNC: 108 MMOL/L
CO2 SERPL-SCNC: 25 MMOL/L
CODING SYSTEM: NORMAL
CREAT SERPL-MCNC: 1 MG/DL
DACRYOCYTES BLD QL SMEAR: ABNORMAL
DIFFERENTIAL METHOD: ABNORMAL
DISPENSE STATUS: NORMAL
EOSINOPHIL # BLD AUTO: 0.1 K/UL
EOSINOPHIL NFR BLD: 9.4 %
ERYTHROCYTE [DISTWIDTH] IN BLOOD BY AUTOMATED COUNT: 14.3 %
EST. GFR  (AFRICAN AMERICAN): >60 ML/MIN/1.73 M^2
EST. GFR  (NON AFRICAN AMERICAN): 53 ML/MIN/1.73 M^2
GLUCOSE SERPL-MCNC: 89 MG/DL
HCT VFR BLD AUTO: 22.2 %
HGB BLD-MCNC: 7.4 G/DL
HYPOCHROMIA BLD QL SMEAR: ABNORMAL
IMM GRANULOCYTES # BLD AUTO: 0 K/UL
IMM GRANULOCYTES NFR BLD AUTO: 0 %
LYMPHOCYTES # BLD AUTO: 0.9 K/UL
LYMPHOCYTES NFR BLD: 73.2 %
MCH RBC QN AUTO: 29.2 PG
MCHC RBC AUTO-ENTMCNC: 33.3 G/DL
MCV RBC AUTO: 88 FL
MONOCYTES # BLD AUTO: 0 K/UL
MONOCYTES NFR BLD: 3.1 %
NEUTROPHILS # BLD AUTO: 0.2 K/UL
NEUTROPHILS NFR BLD: 13.5 %
NRBC BLD-RTO: 0 /100 WBC
NUM UNITS TRANS WBC-POOR PLATPHERESIS: NORMAL
OVALOCYTES BLD QL SMEAR: ABNORMAL
PLATELET # BLD AUTO: 16 K/UL
PLATELET BLD QL SMEAR: ABNORMAL
PMV BLD AUTO: 12 FL
POIKILOCYTOSIS BLD QL SMEAR: SLIGHT
POLYCHROMASIA BLD QL SMEAR: ABNORMAL
POTASSIUM SERPL-SCNC: 3.5 MMOL/L
PROT SERPL-MCNC: 7.9 G/DL
RBC # BLD AUTO: 2.53 M/UL
SCHISTOCYTES BLD QL SMEAR: PRESENT
SODIUM SERPL-SCNC: 141 MMOL/L
WBC # BLD AUTO: 1.27 K/UL

## 2017-10-19 PROCEDURE — 25000003 PHARM REV CODE 250: Performed by: HOSPITALIST

## 2017-10-19 PROCEDURE — 80053 COMPREHEN METABOLIC PANEL: CPT

## 2017-10-19 PROCEDURE — P9037 PLATE PHERES LEUKOREDU IRRAD: HCPCS

## 2017-10-19 PROCEDURE — 36430 TRANSFUSION BLD/BLD COMPNT: CPT

## 2017-10-19 PROCEDURE — 85025 COMPLETE CBC W/AUTO DIFF WBC: CPT

## 2017-10-19 PROCEDURE — 86644 CMV ANTIBODY: CPT

## 2017-10-19 PROCEDURE — P9040 RBC LEUKOREDUCED IRRADIATED: HCPCS

## 2017-10-19 PROCEDURE — 97116 GAIT TRAINING THERAPY: CPT

## 2017-10-19 PROCEDURE — 63600175 PHARM REV CODE 636 W HCPCS: Performed by: HOSPITALIST

## 2017-10-19 PROCEDURE — 99238 HOSP IP/OBS DSCHRG MGMT 30/<: CPT | Mod: ,,, | Performed by: INTERNAL MEDICINE

## 2017-10-19 PROCEDURE — 25000003 PHARM REV CODE 250: Performed by: INTERNAL MEDICINE

## 2017-10-19 PROCEDURE — 36415 COLL VENOUS BLD VENIPUNCTURE: CPT

## 2017-10-19 RX ORDER — HYDROCODONE BITARTRATE AND ACETAMINOPHEN 500; 5 MG/1; MG/1
TABLET ORAL
Status: DISCONTINUED | OUTPATIENT
Start: 2017-10-19 | End: 2017-10-19 | Stop reason: HOSPADM

## 2017-10-19 RX ORDER — DIPHENHYDRAMINE HCL 25 MG
25 CAPSULE ORAL
Status: DISCONTINUED | OUTPATIENT
Start: 2017-10-19 | End: 2017-10-19 | Stop reason: HOSPADM

## 2017-10-19 RX ORDER — FLUCONAZOLE 200 MG/1
200 TABLET ORAL DAILY
Qty: 30 TABLET | Refills: 0 | Status: SHIPPED | OUTPATIENT
Start: 2017-10-20 | End: 2017-11-19

## 2017-10-19 RX ORDER — CIPROFLOXACIN 500 MG/1
500 TABLET ORAL 2 TIMES DAILY
Qty: 60 TABLET | Refills: 0 | Status: SHIPPED | OUTPATIENT
Start: 2017-10-24 | End: 2018-03-26

## 2017-10-19 RX ORDER — ACYCLOVIR 200 MG/1
400 CAPSULE ORAL 2 TIMES DAILY
Qty: 120 CAPSULE | Refills: 11 | Status: SHIPPED | OUTPATIENT
Start: 2017-10-19 | End: 2018-01-23 | Stop reason: SDUPTHER

## 2017-10-19 RX ORDER — LEVOFLOXACIN 750 MG/1
750 TABLET ORAL EVERY OTHER DAY
Qty: 3 TABLET | Refills: 0 | Status: SHIPPED | OUTPATIENT
Start: 2017-10-19 | End: 2017-10-25

## 2017-10-19 RX ORDER — ACETAMINOPHEN 325 MG/1
650 TABLET ORAL
Status: DISCONTINUED | OUTPATIENT
Start: 2017-10-19 | End: 2017-10-19 | Stop reason: HOSPADM

## 2017-10-19 RX ADMIN — CEFEPIME HYDROCHLORIDE 2 G: 2 INJECTION, SOLUTION INTRAVENOUS at 01:10

## 2017-10-19 RX ADMIN — DIPHENHYDRAMINE HYDROCHLORIDE 25 MG: 25 CAPSULE ORAL at 12:10

## 2017-10-19 RX ADMIN — LEVOTHYROXINE SODIUM 25 MCG: 25 TABLET ORAL at 05:10

## 2017-10-19 RX ADMIN — ACYCLOVIR 400 MG: 200 CAPSULE ORAL at 08:10

## 2017-10-19 RX ADMIN — VALSARTAN 80 MG: 40 TABLET ORAL at 08:10

## 2017-10-19 RX ADMIN — FLUCONAZOLE 200 MG: 200 TABLET ORAL at 08:10

## 2017-10-19 RX ADMIN — HYDROCODONE BITARTRATE AND ACETAMINOPHEN 1 TABLET: 5; 325 TABLET ORAL at 12:10

## 2017-10-19 RX ADMIN — ACETAMINOPHEN 650 MG: 325 TABLET ORAL at 12:10

## 2017-10-19 NOTE — PT/OT/SLP PROGRESS
Physical Therapy  Treatment    Shara Rose   MRN: [id number]   Admitting Diagnosis: Neutropenic fever    PT Received On: 10/19/17  PT Start Time: 1302     PT Stop Time: 1313    PT Total Time (min): 11 min       Billable Minutes:  Gait [id number]    Treatment Type: Treatment  PT/PTA: PT             General Precautions: Standard, fall  Orthopedic Precautions: N/A   Braces: N/A    Do you have any cultural, spiritual, Voodoo conflicts, given your current situation?: None stated     Subjective:  Communicated with nurse prior to session.  Pt states she is waiting to get platlets (PT spoke with nurse who states pt has time to walk with PT)  Pain/Comfort  Pain Rating 1: 0/10  Pain Rating Post-Intervention 1: 0/10    Objective:   Patient found with:  (none)    Functional Mobility:  Bed Mobility:   Supine to Sit: Independent  Sit to Supine: Independent    Transfers:  Sit <> Stand Assistance: Independent  Sit <> Stand Assistive Device: No Assistive Device    Gait:   Gait Distance: 400ft with no AD with mild instability noted with pt reaching for the wall rail for support.   Assistance 1: Supervision  Gait Assistive Device: No device  Gait Deviation(s): decreased sharron, decreased step length, decreased stride length, forward lean    Balance:   Static Sit: FAIR+: Able to take MINIMAL challenges from all directions  Dynamic Sit: FAIR+: Maintains balance through MINIMAL excursions of active trunk motion  Static Stand: FAIR+: Takes MINIMAL challenges from all directions  Dynamic stand: GOOD: Needs SUPERVISION only during gait and able to self right with moderate      AM-PAC 6 CLICK MOBILITY  How much help from another person does this patient currently need?   1 = Unable, Total/Dependent Assistance  2 = A lot, Maximum/Moderate Assistance  3 = A little, Minimum/Contact Guard/Supervision  4 = None, Modified [place]/Independent    Turning over in bed (including adjusting bedclothes, sheets and blankets)?: 4  Sitting down  on and standing up from a chair with arms (e.g., wheelchair, bedside commode, etc.): 4  Moving from lying on back to sitting on the side of the bed?: 4  Moving to and from a bed to a chair (including a wheelchair)?: 4  Need to walk in hospital room?: 3  Climbing 3-5 steps with a railing?: 3  Total Score: 22    AM-PAC Raw Score CMS G-Code Modifier Level of Impairment Assistance   6 % Total / Unable   7 - 9 CM 80 - 100% Maximal Assist   10 - 14 CL 60 - 80% Moderate Assist   15 - 19 CK 40 - 60% Moderate Assist   20 - 22 CJ 20 - 40% Minimal Assist   23 CI 1-20% SBA / CGA   24 CH 0% Independent/ Mod I     Patient left supine with all lines intact, call button in reach and nurse notified.    Assessment:  Shara Rose is a 82 y.o. female with a medical diagnosis of Neutropenic fever and presents with mild instability with gait with pt reaching for the wall rail..    Rehab identified problem list/impairments: Rehab identified problem list/impairments: weakness, impaired functional mobilty, gait instability, impaired endurance, impaired balance    Rehab potential is good.    Activity tolerance: Good    Discharge recommendations: Discharge Facility/Level Of Care Needs: home     Barriers to discharge: Barriers to Discharge: Decreased caregiver support (pt helps care for her )    Equipment recommendations: Equipment Needed After Discharge: none     GOALS:    Physical Therapy Goals        Problem: Physical Therapy Goal    Goal Priority Disciplines Outcome Goal Variances Interventions   Physical Therapy Goal     PT/OT, PT Ongoing (interventions implemented as appropriate)     Description:  Goals to be met by: 17     Patient will increase functional independence with mobility by performin. Sit to stand transfer with [place]  2. Bed to chair transfer with [place]   3. Gait  x 300 feet with Supervision with no LOB while completing start/stop activities and head turns                      PLAN:    Patient to be seen 2 x/week  to address the above listed problems via gait training, therapeutic activities, therapeutic exercises  Plan of Care expires: 11/16/17  Plan of Care reviewed with: patient         Sri REGINALDO Solorzano, PT  10/19/2017

## 2017-10-19 NOTE — PLAN OF CARE
MDR's with Dr Wang.  Patient has remained afebrile.  Plan to d/c home today with family support.  No HH/DME needs.  Patient states that she has ride home.  CM reviewed f/u with patient.  IMM signed and the patient agrees with the d/c summary.    Future Appointments  Date Time Provider Department Center   10/23/2017 10:00 AM LAB, HEMONC CANCER BLDG [facility] LAB HO [facility]   10/23/2017 11:20 AM Juan Wang MD [facility] BM TELLEZ [facility]   10/23/2017 1:00 PM [facility] CHEMO [facility] CHEMO [facility]        10/19/17 1303   Final Note   Assessment Type Final Discharge Note   Discharge Disposition Home   What phone number can be called within the next 1-3 days to see how you are doing after discharge? ([phone])   Hospital Follow Up  Appt(s) scheduled? Yes   Right Care Referral Info   Post Acute Recommendation No Care

## 2017-10-19 NOTE — PROGRESS NOTES
Per MD pt is medically stable for Dc. No SW needs identified. Pt DC home with family support. Based on all available information this is a safe and appropriate DC plan.

## 2017-10-19 NOTE — PLAN OF CARE
Problem: Patient Care Overview  Goal: Plan of Care Review  Outcome: Ongoing (interventions implemented as appropriate)  Patient AAOx4, VSS but with hypertensive blood pressures up to the 170s systolic; however, when repeated manual in the 160s. Patient is afebrile, and without injury. Patient received one unit of platelets today and is receiving one unit of blood--tolerating both well. Patient's IV cefepime was discontinued by the MD as she transitions to go home on PO antibiotics. Patient is without distress on room air. Patient is preparing for discharge once the PRBCs finish. Patient is without questions or complaints at this time, will continue to monitor.

## 2017-10-19 NOTE — PLAN OF CARE
Problem: Physical Therapy Goal  Goal: Physical Therapy Goal  Goals to be met by: 17     Patient will increase functional independence with mobility by performin. Sit to stand transfer with [place]  2. Bed to chair transfer with [place]   3. Gait  x 300 feet with Supervision with no LOB while completing start/stop activities and head turns      Outcome: Ongoing (interventions implemented as appropriate)  Pt's goals remain appropriate and pt will continue to benefit from skilled PT services to work towards improved functional mobility including:  gait.   Sri Solorzano, PT  10/19/2017

## 2017-10-20 LAB
BACTERIA BLD CULT: NORMAL
BACTERIA BLD CULT: NORMAL

## 2017-10-20 NOTE — ASSESSMENT & PLAN NOTE
- likely source is PNA, decrease ANC  - IV cefepime renally dose- transitioned to levaquin  - aspergillus antigen anegative.  fungitell - pending  - fungal ppx

## 2017-10-20 NOTE — DISCHARGE SUMMARY
[facility]  Hematology  Bone Marrow Transplant  Discharge Summary      Patient Name: Shara Rose  MRN: [id number]  Admission Date: 10/15/2017  Hospital Length of Stay: 4 days  Discharge Date and Time: 10/19/2017  8:02 PM  Attending Physician: No att. providers found   Discharging Provider: Ye Soni MD  Primary Care Provider: Alana Carter MD    HPI: No notes on file    * No surgery found *     HPI: Patient is a 83 yo female with hx of HTN, sickle cell trait and MDS who was transferred from OSH for further evaluation and treatment of neutropenic fever possibly secondary to PNA. Patient states she has felt weak and unsteady on her feet for the last few days. She also states she has had right sided chest pain that started 3 days ago that is worse with inspiration. Reports associated cough.      At OSH patient had CXR that showed PNA. EKG negative for ischemia and troponin negative. Labs consistent with pancytopenia with ANC of 295.      Hospital Course: No notes on file    Repeat CT scan showed PNA.  Pt tolerated cefepime and was discharged home to completed total of 10 days with Levaquin.   Fungitell pending at discharge.   -supportive care with transfusions on the day of discharge with follow up in clinic for labs and additional transfusion as needed.     Significant Diagnostic Studies: Labs:   CMP   Recent Labs  Lab 10/19/17  0531      K 3.5      CO2 25   GLU 89   BUN 13   CREATININE 1.0   CALCIUM 9.6   PROT 7.9   ALBUMIN 2.4*   BILITOT 0.5   ALKPHOS 82   AST 13   ALT 6*   ANIONGAP 8   ESTGFRAFRICA >60.0   EGFRNONAA 53.0*    and CBC   Recent Labs  Lab 10/19/17  0531   WBC 1.27*   HGB 7.4*   HCT 22.2*   PLT 16*       Pending Diagnostic Studies:     None        Final Active Diagnoses:    Diagnosis Date Noted POA    PRINCIPAL PROBLEM:  Neutropenic fever [D70.9, R50.81] 10/14/2017 Yes    MDS (myelodysplastic syndrome) [D46.9] 08/14/2017 Yes    Debility [R53.81] 10/16/2017  Yes    PNA (pneumonia) [J18.9] 10/15/2017 Yes    Lung consolidation [J18.1] 10/15/2017 Yes    Stage 2 chronic kidney disease [N18.2]  Yes    Hypertension [I10]  Yes    Hypothyroidism [E03.9]  Yes    Pancytopenia [D61.818] 08/14/2014 Yes      Problems Resolved During this Admission:    Diagnosis Date Noted Date Resolved POA      Discharged Condition: fair    Disposition: Home or Self Care    Follow Up:  Follow-up Information     [facility] On 10/23/2017.    Specialty:  Lab  Why:  Labs- 10:00am  Contact information:  [address] [facility] [id number] [phone]  Additional information:  [facility] 3rd Floor           Juan Wang MD On 10/23/2017.    Specialty:  Hematology and Oncology  Why:  follow up- 11:20am  Contact information:  [address] [facility] [address]  [phone]             [facility] On 10/23/2017.    Specialty:  Chemotherapy  Why:  Blood if needed- 1:00pm  Contact information:  [address] [facility] [id number] [phone]  Additional information:  [facility], 5th Floor               Patient Instructions:     Diet general     Activity as tolerated     Call MD for:  temperature >100.4     Call MD for:  persistent nausea and vomiting or diarrhea     Call MD for:  severe uncontrolled pain     Call MD for:  difficulty breathing or increased cough     Call MD for:  redness, tenderness, or signs of infection (pain, swelling, redness, odor or green/yellow discharge around incision site)     Call MD for:  worsening rash     Call MD for:  increased confusion or weakness       Medications:  Reconciled Home Medications:   Discharge Medication List as of 10/19/2017  5:24 PM      START taking these medications    Details   acyclovir (ZOVIRAX) 200 MG capsule Take 2 capsules (400 mg total) by mouth 2 (two) times daily., Starting Thu 10/19/2017, Until Fri 10/19/2018, Normal      ciprofloxacin  HCl (CIPRO) 500 MG tablet Take 1 tablet (500 mg total) by mouth 2 (two) times daily., Starting Tue 10/24/2017, Normal      fluconazole (DIFLUCAN) 200 MG Tab Take 1 tablet (200 mg total) by mouth once daily., Starting Fri 10/20/2017, Until Sun 11/19/2017, Normal      levoFLOXacin (LEVAQUIN) 750 MG tablet Take 1 tablet (750 mg total) by mouth every other day., Starting Thu 10/19/2017, Until Wed 10/25/2017, Normal         CONTINUE these medications which have NOT CHANGED    Details   AZACITIDINE (VIDAZA INJ) Inject as directed., Historical Med      clotrimazole-betamethasone 1-0.05% (LOTRISONE) cream Apply topically 2 (two) times daily., Starting 11/16/2015, Until Discontinued, Normal      levothyroxine (SYNTHROID) 25 MCG tablet TAKE 1 TABLET EVERY DAY, Normal      meclizine (ANTIVERT) 25 mg tablet TK 1 T PO BID FOR 10 DAYS PRF DIZZINESS, Historical Med      ondansetron (ZOFRAN) 8 MG tablet Take 1 tablet (8 mg total) by mouth every 12 (twelve) hours as needed for Nausea., Starting Tue 8/22/2017, Until Wed 8/22/2018, Normal      valsartan (DIOVAN) 80 MG tablet TAKE 1 TABLET ONE TIME DAILY, Normal      verapamil (CALAN-SR) 240 MG CR tablet TAKE 1 TABLET EVERY DAY, Normal             Ye Soni MD  Bone Marrow Transplant  [facility]

## 2017-10-20 NOTE — PROGRESS NOTES
Physical Therapy Discharge Summary    Shara Rose  MRN: [id number]   Neutropenic fever   Patient Discharged from acute Physical Therapy on 10/19/17.  Please refer to prior PT noted date on 10/19/17 for functional status.     Assessment:   Patient appropriate for care in another setting.  GOALS:    Physical Therapy Goals     Not on file          Multidisciplinary Problems (Resolved)        Problem: Physical Therapy Goal    Goal Priority Disciplines Outcome Goal Variances Interventions   Physical Therapy Goal   (Resolved)     PT/OT, PT Outcome(s) achieved     Description:  Goals to be met by: 17     Patient will increase functional independence with mobility by performin. Sit to stand transfer with [place]-met  2. Bed to chair transfer with [place]   3. Gait  x 300 feet with Supervision with no LOB while completing start/stop activities and head turns                      Reasons for Discontinuation of Therapy Services  Transfer to alternate level of care.      Plan:  Patient Discharged to: Home with family assist.

## 2017-10-20 NOTE — NURSING
ROAD TEST:    Oxygen: room air    Ambulates: ambulates without assistance    Tolerating: regular diet    Elimination: voids without difficulty    ADLs: independent    Teaching: An informational handout was provided to the patient which included instructions that addressed activity level, diet, discharge medications, follow-up appointments, weight monitoring and what to do if symptoms worsen.  Discussed all discharge instructions and medication education with the patient. Patient verbalizes understanding of all information given. Patient states she has no further questions. Patient leaving with family via wheelchair.

## 2017-10-20 NOTE — ASSESSMENT & PLAN NOTE
"- CTA from [facility] (image above) with "masslike consolidation in the left upper lobe, suspicious for pneumonia in appropriate clinical setting ". Otherwise, there is concern for neoplasm  - CT chest 10/17 with evidence more supporting PNA  - as above  "

## 2017-10-21 LAB
BACTERIA BLD CULT: NORMAL
BACTERIA BLD CULT: NORMAL

## 2017-10-22 LAB
BLD PROD TYP BPU: NORMAL
BLD PROD TYP BPU: NORMAL
BLOOD UNIT EXPIRATION DATE: NORMAL
BLOOD UNIT EXPIRATION DATE: NORMAL
BLOOD UNIT TYPE CODE: 5100
BLOOD UNIT TYPE CODE: 5100
BLOOD UNIT TYPE: NORMAL
BLOOD UNIT TYPE: NORMAL
CODING SYSTEM: NORMAL
CODING SYSTEM: NORMAL
DISPENSE STATUS: NORMAL
DISPENSE STATUS: NORMAL
NUM UNITS TRANS PACKED RBC: NORMAL
NUM UNITS TRANS PACKED RBC: NORMAL

## 2017-10-23 ENCOUNTER — OFFICE VISIT (OUTPATIENT)
Dept: HEMATOLOGY/ONCOLOGY | Facility: CLINIC | Age: 82
End: 2017-10-23
Payer: MEDICARE

## 2017-10-23 ENCOUNTER — LAB VISIT (OUTPATIENT)
Dept: LAB | Facility: HOSPITAL | Age: 82
End: 2017-10-23
Attending: INTERNAL MEDICINE
Payer: MEDICARE

## 2017-10-23 VITALS
OXYGEN SATURATION: 100 % | HEART RATE: 67 BPM | SYSTOLIC BLOOD PRESSURE: 154 MMHG | BODY MASS INDEX: 20.46 KG/M2 | DIASTOLIC BLOOD PRESSURE: 66 MMHG | HEIGHT: 65 IN | TEMPERATURE: 98 F | WEIGHT: 122.81 LBS

## 2017-10-23 DIAGNOSIS — D61.818 PANCYTOPENIA: ICD-10-CM

## 2017-10-23 DIAGNOSIS — D47.2 MONOCLONAL GAMMOPATHY OF UNDETERMINED SIGNIFICANCE: ICD-10-CM

## 2017-10-23 DIAGNOSIS — J18.9 PNEUMONIA DUE TO INFECTIOUS ORGANISM, UNSPECIFIED LATERALITY, UNSPECIFIED PART OF LUNG: ICD-10-CM

## 2017-10-23 DIAGNOSIS — D46.9 MDS (MYELODYSPLASTIC SYNDROME): ICD-10-CM

## 2017-10-23 DIAGNOSIS — D46.9 MYELODYSPLASTIC SYNDROME: ICD-10-CM

## 2017-10-23 DIAGNOSIS — D46.9 MYELODYSPLASTIC SYNDROME: Primary | ICD-10-CM

## 2017-10-23 LAB
ABO + RH BLD: NORMAL
ALBUMIN SERPL BCP-MCNC: 2.8 G/DL
ALP SERPL-CCNC: 94 U/L
ALT SERPL W/O P-5'-P-CCNC: 9 U/L
ANION GAP SERPL CALC-SCNC: 6 MMOL/L
ANISOCYTOSIS BLD QL SMEAR: SLIGHT
AST SERPL-CCNC: 20 U/L
BASOPHILS # BLD AUTO: 0.01 K/UL
BASOPHILS NFR BLD: 0.8 %
BILIRUB SERPL-MCNC: 0.6 MG/DL
BLD GP AB SCN CELLS X3 SERPL QL: NORMAL
BUN SERPL-MCNC: 14 MG/DL
CALCIUM SERPL-MCNC: 9.8 MG/DL
CHLORIDE SERPL-SCNC: 107 MMOL/L
CO2 SERPL-SCNC: 30 MMOL/L
CREAT SERPL-MCNC: 1.2 MG/DL
DIFFERENTIAL METHOD: ABNORMAL
EOSINOPHIL # BLD AUTO: 0.1 K/UL
EOSINOPHIL NFR BLD: 5.5 %
ERYTHROCYTE [DISTWIDTH] IN BLOOD BY AUTOMATED COUNT: 14.1 %
EST. GFR  (AFRICAN AMERICAN): 49 ML/MIN/1.73 M^2
EST. GFR  (NON AFRICAN AMERICAN): 42.5 ML/MIN/1.73 M^2
GLUCOSE SERPL-MCNC: 84 MG/DL
HCT VFR BLD AUTO: 27.3 %
HGB BLD-MCNC: 9.2 G/DL
HYPOCHROMIA BLD QL SMEAR: ABNORMAL
IMM GRANULOCYTES # BLD AUTO: 0 K/UL
IMM GRANULOCYTES NFR BLD AUTO: 0 %
LYMPHOCYTES # BLD AUTO: 0.9 K/UL
LYMPHOCYTES NFR BLD: 71.9 %
MAGNESIUM SERPL-MCNC: 1.7 MG/DL
MCH RBC QN AUTO: 29.3 PG
MCHC RBC AUTO-ENTMCNC: 33.7 G/DL
MCV RBC AUTO: 87 FL
MONOCYTES # BLD AUTO: 0.1 K/UL
MONOCYTES NFR BLD: 3.9 %
NEUTROPHILS # BLD AUTO: 0.2 K/UL
NEUTROPHILS NFR BLD: 17.9 %
NRBC BLD-RTO: 0 /100 WBC
PHOSPHATE SERPL-MCNC: 2.9 MG/DL
PLATELET # BLD AUTO: 12 K/UL
PLATELET BLD QL SMEAR: ABNORMAL
PMV BLD AUTO: 11.7 FL
POIKILOCYTOSIS BLD QL SMEAR: SLIGHT
POLYCHROMASIA BLD QL SMEAR: ABNORMAL
POTASSIUM SERPL-SCNC: 3.5 MMOL/L
PROT SERPL-MCNC: 8.8 G/DL
RBC # BLD AUTO: 3.14 M/UL
SODIUM SERPL-SCNC: 143 MMOL/L
TARGETS BLD QL SMEAR: ABNORMAL
WBC # BLD AUTO: 1.28 K/UL

## 2017-10-23 PROCEDURE — 86900 BLOOD TYPING SEROLOGIC ABO: CPT

## 2017-10-23 PROCEDURE — 80053 COMPREHEN METABOLIC PANEL: CPT

## 2017-10-23 PROCEDURE — 86850 RBC ANTIBODY SCREEN: CPT

## 2017-10-23 PROCEDURE — 36415 COLL VENOUS BLD VENIPUNCTURE: CPT

## 2017-10-23 PROCEDURE — 85025 COMPLETE CBC W/AUTO DIFF WBC: CPT

## 2017-10-23 PROCEDURE — 83735 ASSAY OF MAGNESIUM: CPT

## 2017-10-23 PROCEDURE — 99215 OFFICE O/P EST HI 40 MIN: CPT | Mod: S$GLB,,, | Performed by: INTERNAL MEDICINE

## 2017-10-23 PROCEDURE — 84100 ASSAY OF PHOSPHORUS: CPT

## 2017-10-23 PROCEDURE — 99999 PR PBB SHADOW E&M-EST. PATIENT-LVL III: CPT | Mod: PBBFAC,,, | Performed by: INTERNAL MEDICINE

## 2017-10-23 NOTE — PROGRESS NOTES
SECTION OF HEMATOLOGY AND BONE MARROW TRANSPLANT  Return Patient Visit   10/24/2017    CHIEF COMPLAINT:   No chief complaint on file.      HISTORY OF PRESENT ILLNESS:   Shara Rose is a 82 y.o. female who is referred to me July 2017 for a Hematology consultation for further evaluation of anemia. Patient's past medical history includes Sickle Cell Trait, Hypothyroidism, Chronic Kidney Disease.  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  Decision made to proceed with palliative vidaza.  She is now s/p 2 cycles.   She required admission from 10/14-10/19 for neutropenic fever attributed to CAP.  She was discharged on po levaquin and is doing well since d/c. Afebrile.  Decision made to delay next cycle of vidaza by one week to allow completion of antibiotic course.    She comes to clinic with friend. Fatigued but otherwise without complaints.      PAST MEDICAL HISTORY:   Past Medical History:   Diagnosis Date    Allergy     Anemia     Arthritis     Cataract     CKD (chronic kidney disease)     Gout, unspecified     Hypertension     Hypothyroidism     MDS (myelodysplastic syndrome) 8/14/2017    Menopause     PNA (pneumonia) 10/15/2017    Sickle cell trait     Trigger finger        PAST SURGICAL HISTORY:   Past Surgical History:   Procedure Laterality Date    APPENDECTOMY      HEMORRHOID SURGERY      HYSTERECTOMY         PAST SOCIAL HISTORY:   reports that she quit smoking about 45 years ago. She has a 0.25 pack-year smoking history. She has never used smokeless tobacco. She reports that she does not drink alcohol or use drugs.    FAMILY HISTORY:  Family History   Problem Relation Age of Onset    Hypertension Mother     Peripheral vascular disease Father     Heart disease Father     Cataracts Father     Cancer Son     Diabetes Neg Hx     Amblyopia Neg Hx     Blindness Neg Hx     Glaucoma Neg Hx     Macular degeneration Neg Hx   "   Retinal detachment Neg Hx     Strabismus Neg Hx        CURRENT MEDICATIONS:   Current Outpatient Prescriptions   Medication Sig    acyclovir (ZOVIRAX) 200 MG capsule Take 2 capsules (400 mg total) by mouth 2 (two) times daily.    AZACITIDINE (VIDAZA INJ) Inject as directed.    ciprofloxacin HCl (CIPRO) 500 MG tablet Take 1 tablet (500 mg total) by mouth 2 (two) times daily.    clotrimazole-betamethasone 1-0.05% (LOTRISONE) cream Apply topically 2 (two) times daily.    fluconazole (DIFLUCAN) 200 MG Tab Take 1 tablet (200 mg total) by mouth once daily.    levoFLOXacin (LEVAQUIN) 750 MG tablet Take 1 tablet (750 mg total) by mouth every other day.    levothyroxine (SYNTHROID) 25 MCG tablet TAKE 1 TABLET EVERY DAY    meclizine (ANTIVERT) 25 mg tablet TK 1 T PO BID FOR 10 DAYS PRF DIZZINESS    ondansetron (ZOFRAN) 8 MG tablet Take 1 tablet (8 mg total) by mouth every 12 (twelve) hours as needed for Nausea.    valsartan (DIOVAN) 80 MG tablet TAKE 1 TABLET ONE TIME DAILY    verapamil (CALAN-SR) 240 MG CR tablet TAKE 1 TABLET EVERY DAY     No current facility-administered medications for this visit.      ALLERGIES:   Review of patient's allergies indicates:   Allergen Reactions    Sulfa (sulfonamide antibiotics) Itching and Rash           REVIEW OF SYSTEMS:   Review of Systems - General ROS: negative  Psychological ROS: negative  Ophthalmic ROS: negative  Allergy and Immunology ROS: negative  Breast ROS: negative  Respiratory ROS: negative  Cardiovascular ROS: negative  Gastrointestinal ROS: negative  Genito-Urinary ROS: negative  Musculoskeletal ROS: negative  Neurological ROS: negative  Dermatological ROS: negative    PHYSICAL EXAM:   Vitals:    10/23/17 1503 10/23/17 1547   BP: (!) 168/72 (!) 154/66   Pulse: 67    Temp: 98.2 °F (36.8 °C)    SpO2: 100%    Weight: 55.7 kg (122 lb 12.7 oz)    Height: 5' 5" (1.651 m)    PainSc: 0-No pain      General - well developed, well nourished, no apparent " distress  HEENT - oropharynx clear  Chest and Lung - clear to auscultation bilaterally   Cardiovascular - RRR with no MGR, normal S1 and S2  Abdomen-  soft, nontender, no palpable hepatomegaly or splenomegaly  Lymph - no palpable lymphadenopathy  Heme - no bruising, petechiae, pallor  Skin - no rashes or lesions  Psych - appropriate mood and affect      ECOG Performance Status: (foot note - ECOG PS provided by Eastern Cooperative Oncology Group) 1 - Symptomatic but completely ambulatory    Karnofsky Performance Score:  80%- Normal Activity with Effort: Some Symptoms of Disease  DATA:   Lab Results   Component Value Date    WBC 1.28 (LL) 10/23/2017    HGB 9.2 (L) 10/23/2017    HCT 27.3 (L) 10/23/2017    MCV 87 10/23/2017    PLT 12 (LL) 10/23/2017     Gran #   Date Value Ref Range Status   10/23/2017 0.2 (L) 1.8 - 7.7 K/uL Final     Gran%   Date Value Ref Range Status   10/23/2017 17.9 (L) 38.0 - 73.0 % Final     Lymph #   Date Value Ref Range Status   10/23/2017 0.9 (L) 1.0 - 4.8 K/uL Final     Lymph%   Date Value Ref Range Status   10/23/2017 71.9 (H) 18.0 - 48.0 % Final     CMP  Sodium   Date Value Ref Range Status   10/23/2017 143 136 - 145 mmol/L Final     Potassium   Date Value Ref Range Status   10/23/2017 3.5 3.5 - 5.1 mmol/L Final     Chloride   Date Value Ref Range Status   10/23/2017 107 95 - 110 mmol/L Final     CO2   Date Value Ref Range Status   10/23/2017 30 (H) 23 - 29 mmol/L Final     Glucose   Date Value Ref Range Status   10/23/2017 84 70 - 110 mg/dL Final     BUN, Bld   Date Value Ref Range Status   10/23/2017 14 8 - 23 mg/dL Final     Creatinine   Date Value Ref Range Status   10/23/2017 1.2 0.5 - 1.4 mg/dL Final     Calcium   Date Value Ref Range Status   10/23/2017 9.8 8.7 - 10.5 mg/dL Final     Total Protein   Date Value Ref Range Status   10/23/2017 8.8 (H) 6.0 - 8.4 g/dL Final     Albumin   Date Value Ref Range Status   10/23/2017 2.8 (L) 3.5 - 5.2 g/dL Final     Total Bilirubin   Date Value  Ref Range Status   10/23/2017 0.6 0.1 - 1.0 mg/dL Final     Comment:     For infants and newborns, interpretation of results should be based  on gestational age, weight and in agreement with clinical  observations.  Premature Infant recommended reference ranges:  Up to 24 hours.............<8.0 mg/dL  Up to 48 hours............<12.0 mg/dL  3-5 days..................<15.0 mg/dL  6-29 days.................<15.0 mg/dL       Alkaline Phosphatase   Date Value Ref Range Status   10/23/2017 94 55 - 135 U/L Final     AST   Date Value Ref Range Status   10/23/2017 20 10 - 40 U/L Final     ALT   Date Value Ref Range Status   10/23/2017 9 (L) 10 - 44 U/L Final     Anion Gap   Date Value Ref Range Status   10/23/2017 6 (L) 8 - 16 mmol/L Final     eGFR if    Date Value Ref Range Status   10/23/2017 49.0 (A) >60 mL/min/1.73 m^2 Final     eGFR if non    Date Value Ref Range Status   10/23/2017 42.5 (A) >60 mL/min/1.73 m^2 Final     Comment:     Calculation used to obtain the estimated glomerular filtration  rate (eGFR) is the CKD-EPI equation. Since race is unknown   in our information system, the eGFR values for   -American and Non--American patients are given   for each creatinine result.       LD   Date Value Ref Range Status   08/03/2017 184 110 - 260 U/L Final     Comment:     Results are increased in hemolyzed samples.     Lab Results   Component Value Date    IRON 122 08/03/2017    TIBC 209 (L) 08/03/2017    FERRITIN 576 (H) 08/03/2017     Lab Results   Component Value Date    OEDSGXMA20 362 08/03/2017     Lab Results   Component Value Date    FOLATE 35.2 (H) 08/03/2017   Pathologist Interpretation TAHIR   Pathologist Interpretation TAHIR   Collected: 08/03/17 1522   Resulting lab: [facility]   Value: REVIEWED   Comment: Electronically reviewed and signed by:   Rhianna Mao MD   Signed on 08/04/17 at 13:32   There is a very faint IgA kappa monoclonal  band in beta. - SPEP negative     HEMOGLOBIN ELECTROPHORESIS,HGB A2 UGO.   Order: [id number]   Status:  Final result   Visible to patient:  No (Not Released) Next appt:  None Dx:  Anemia     Ref Range & Units 3yr ago 6yr ago    Hgb A2 Quant 1.5 - 3.8 % 2.4 2.2CM    Hemoglobin Bands   Hb A , Hb S and Hb A2 textCM    Hemoglobin Electrophoresis Interp   See comment    Comments: Hemoglobins A and S are present, measuring approximately 62% and 37%   respectively.  This pattern is compatible with the patient's history   of sickle cell trait, provided the patient has no recent history of   red cell transfusion.  Correlate clinically.   Interpreted by Rhianna Mao M.D.                  Bone Marrow biopsy - 8/7/17  SPECIMEN  1) Bone marrow clot, left iliac crest.  2) Bone marrow core biopsy, left iliac crest.  3) Bone Marrow Aspirate (Slides)  FINAL PATHOLOGIC DIAGNOSIS  LEFT ILIAC CREST BONE MARROW ASPIRATE SMEAR, CLOT SECTION, AND CORE BIOPSY:  -- HYPERCELLULAR MARROW FOR AGE (70%) WITH ERYTHROID HYPERPLASIA, RING SIDEROBLASTS  AND ATYPICAL MEGAKARYOCYTIC PROLIFERATION, HIGHLY SUGGESTIVE OF A MYELODYSPLASTIC  SYNDROME.  -- INCREASED IRON STORAGE.  -- SEE COMMENT.  COMMENT:  CBC data (8/3/2017): WBC 2.31 (granulocyte 22.9%, lymphocyte 55%, monocyte 5.2%, eosinophil 16.9%), RBC  2.66, HGB 8.5, HCT 24.7, MCV 93, MCHC 34.4, PLT 24.  Peripheral blood smear is not submitted for review.  Flow cytometric analysis of bone marrow shows populations of polyclonal B lymphocytes and T lymphocytes that  are immunophenotypically unremarkable. The blast gate is not increased.  Immunohistochemical stains are performed on the biopsy core for greater sensitivity and further architectural  assessment with adequate controls. CD34 highlights rare immature mononuclear cells. The early erythroid  precursors are positive for E-cadherin. CD61 highlights markedly increased megakaryocytes with frequent small  forms. The small lymphoid  aggregates are composed of a mixture of CD3- positive T-cells and CD20-positive  B-cells.  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  Correlation with cytogenetics is critical. Close clinical follow-up is required.  Diagnosed by: Gio Prado  (Electronically Signed: 2017-08-10 15:02:16)  Microscopic Examination  BONE MARROW ASPIRATE DIFFERENTIAL:  Blasts----------------------------------------------1%  Promyelocytes---------------------------------0%  Myelocytes--------------------------------------3%  Metamyelocytes------------------------------ 5%  Bands----------------------------------------------4%  PMN Neutrophils------------------------------6%  Eosinophils------------------------------------------2%  Basophils---------------------------------------0%  Monocytes------------------------------------1%  Lymphocytes-------------------------------------31%  Plasma cells------------------------------------------1%  Erythroid precursors--------------------------47%  BONE MARROW ASPIRATE SMEAR:  The smears contain cellular spicules. The myeloid to erythroid ratio is markedly decreased, approximately 0.5:1.  Some erythroid precursors display megaloblastoid change and a shift towards immaturity. Blasts are not increased.  Megakaryocytes are increased and display frequent atypical nuclear features including hypolobation and small forms  as well as occasional forms with  nuclei. Stainable iron is increased. Ring sideroblasts are seen,  approximately 30-35% of the erythroid precursors.  BONE MARROW CLOT SECTION:  The clot sections contain small spicules with cellular composition that is similar to the biopsy core. Stainable iron is  present.  BONE MARROW CORE BIOPSY:  Cortical and medullary bones are present. The cellularity is approximately  70%. The myeloid to erythroid ratio is  markedly decreased. Megakaryocytes are increased and form small clusters in areas. Small lymphoid aggregates  composed of small mature lymphocytes are seen. Stainable iron is present.  ASSESSMENT AND PLAN:   Encounter Diagnoses   Name Primary?   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 ; Int 2 IPSS; Very high risk R-IPSS  -not a stem cell transplant candidate due to age/comorbidities  -discussed terminal nature of diagnosis and recommendations for hypomethylating agent therapy  -discussed risks of disease associated with bone marrow failure and transformation to AML  -can consider addition of GASTON if no response in anemia to vidaza   -pancytopenia due to MDS; suspect anemia augmented buy underlying sickle trait ;  transfuse for plt >10, hgb >7; no indication for transfusion today   -reviewed neutropenic and bleeding precautions with patient  -prn zofran for home use for CINV  -plan for weekly labs and prn transfusions  -for MGUS; anemia likely due to MDS; no hypercalcemia, Cr at baseline; will discuss skeletal survey at next appt but lack of clonal plasma cells in bone marrow make underlying myeloma highly unlikely    2)ID  -completing course of levaquin for CAP on 10/25  -transition back to cipro ppx  -contineu acyc, fluc ppx     -continue all other chronic medications per pcp     Follow Up: -cbc, cmp, type and screen, and chair for 7 days of vidaza injections on 10/30/17    Ankit Wang MD  Hematology/Oncology/Bone Marrow Transplant

## 2017-10-26 NOTE — PHYSICIAN QUERY
"PT Name: Shara Rose  MR #: [id number]    Physician Query Form - Pneumonia Clarification     CDS/: Julia Renteria Rn              Contact information: [phone]  10/30 Answered in care plan MD note 10/15 JT  This form is a permanent document in the medical record.    Query Date:  October 26, 2017    By submitting this query, we are merely seeking further clarification of documentation. Please utilize your independent clinical judgment when addressing the question(s) below.    The Medical record contains the following:   Indicators   Supporting Clinical Findings Location in Medical Record   x "Pneumonia" documented Treatment of neutropenic fever possibly secondary to PNA.     Pneumonia 10/19 DC summary     x Chest X-Ray: Abnormal focal opacity in left lung, possible mass versus a dense pneumonic consolidation.    10/15 CXR   x PaO2    PaCO2     O2 sat O2 sats 92% 10/15 Flowsheet   x Cultures  Aspergillus antigen anegative.    Fungitell - pending   - fungal ppx  10/18 Bone Marrow transplant   x Treatment  Ciprofloxican 500mg Po twice daily  Cefepime 2Gm IVPB     10/25 MAR  10/15- 10/19 MAR    Supplemental O2     x Other Repeat CT scan showed PNA.      Pt tolerated cefepime and was discharged home to completed total of 10 days with Levaquin.     Fungitell pending at discharge     10/26 DC summary       Provider, please specify type of pneumonia.    [  ] Bacterial Pneumonia (Specify organism): ______________________    [  ] Bacterial, Gram Negative organism Pneumonia    [  ] Viral Pneumonia (Specify virus): _______________________    [  ] Fungal Pneumonia (Specify organism): _______________________    [  ] Other type of pneumonia (please specify): ______________________________________    [  ] Clinically undetermined    Please document in your progress notes daily for the duration of treatment, until resolved, and include in your discharge summary.    .                                                        "

## 2017-10-30 ENCOUNTER — TELEPHONE (OUTPATIENT)
Dept: HEMATOLOGY/ONCOLOGY | Facility: CLINIC | Age: 82
End: 2017-10-30

## 2017-10-30 ENCOUNTER — INFUSION (OUTPATIENT)
Dept: INFUSION THERAPY | Facility: HOSPITAL | Age: 82
End: 2017-10-30
Attending: INTERNAL MEDICINE
Payer: MEDICARE

## 2017-10-30 VITALS
SYSTOLIC BLOOD PRESSURE: 165 MMHG | DIASTOLIC BLOOD PRESSURE: 72 MMHG | RESPIRATION RATE: 18 BRPM | HEART RATE: 58 BPM | TEMPERATURE: 98 F

## 2017-10-30 DIAGNOSIS — D69.6 THROMBOCYTOPENIA: Primary | ICD-10-CM

## 2017-10-30 DIAGNOSIS — D46.9 MDS (MYELODYSPLASTIC SYNDROME): ICD-10-CM

## 2017-10-30 DIAGNOSIS — D69.6 THROMBOCYTOPENIA: ICD-10-CM

## 2017-10-30 DIAGNOSIS — D46.Z MDS (MYELODYSPLASTIC SYNDROME), HIGH GRADE: Primary | ICD-10-CM

## 2017-10-30 LAB
BLD PROD TYP BPU: NORMAL
BLOOD UNIT EXPIRATION DATE: NORMAL
BLOOD UNIT TYPE CODE: 600
BLOOD UNIT TYPE: NORMAL
CODING SYSTEM: NORMAL
DISPENSE STATUS: NORMAL
NUM UNITS TRANS WBC-POOR PLATPHERESIS: NORMAL

## 2017-10-30 PROCEDURE — 25000003 PHARM REV CODE 250: Performed by: NURSE PRACTITIONER

## 2017-10-30 PROCEDURE — P9037 PLATE PHERES LEUKOREDU IRRAD: HCPCS

## 2017-10-30 PROCEDURE — 36430 TRANSFUSION BLD/BLD COMPNT: CPT

## 2017-10-30 PROCEDURE — 63600175 PHARM REV CODE 636 W HCPCS: Mod: JW | Performed by: INTERNAL MEDICINE

## 2017-10-30 PROCEDURE — 96401 CHEMO ANTI-NEOPL SQ/IM: CPT

## 2017-10-30 RX ORDER — ACETAMINOPHEN 325 MG/1
650 TABLET ORAL
Status: CANCELLED | OUTPATIENT
Start: 2017-10-30

## 2017-10-30 RX ORDER — AZACITIDINE 100 MG/1
40 INJECTION, POWDER, LYOPHILIZED, FOR SOLUTION INTRAVENOUS; SUBCUTANEOUS
Status: CANCELLED | OUTPATIENT
Start: 2017-10-31

## 2017-10-30 RX ORDER — DIPHENHYDRAMINE HCL 25 MG
25 CAPSULE ORAL
Status: CANCELLED | OUTPATIENT
Start: 2017-10-30

## 2017-10-30 RX ORDER — AZACITIDINE 100 MG/1
40 INJECTION, POWDER, LYOPHILIZED, FOR SOLUTION INTRAVENOUS; SUBCUTANEOUS
Status: CANCELLED | OUTPATIENT
Start: 2017-11-07

## 2017-10-30 RX ORDER — AZACITIDINE 100 MG/1
40 INJECTION, POWDER, LYOPHILIZED, FOR SOLUTION INTRAVENOUS; SUBCUTANEOUS
Status: CANCELLED | OUTPATIENT
Start: 2017-11-06

## 2017-10-30 RX ORDER — DIPHENHYDRAMINE HCL 25 MG
25 CAPSULE ORAL
Status: COMPLETED | OUTPATIENT
Start: 2017-10-30 | End: 2017-10-30

## 2017-10-30 RX ORDER — HYDROCODONE BITARTRATE AND ACETAMINOPHEN 500; 5 MG/1; MG/1
TABLET ORAL ONCE
Status: CANCELLED | OUTPATIENT
Start: 2017-10-30 | End: 2017-10-30

## 2017-10-30 RX ORDER — ACETAMINOPHEN 325 MG/1
650 TABLET ORAL
Status: COMPLETED | OUTPATIENT
Start: 2017-10-30 | End: 2017-10-30

## 2017-10-30 RX ORDER — AZACITIDINE 100 MG/1
40 INJECTION, POWDER, LYOPHILIZED, FOR SOLUTION INTRAVENOUS; SUBCUTANEOUS
Status: CANCELLED | OUTPATIENT
Start: 2017-10-30

## 2017-10-30 RX ORDER — AZACITIDINE 100 MG/1
40 INJECTION, POWDER, LYOPHILIZED, FOR SOLUTION INTRAVENOUS; SUBCUTANEOUS
Status: CANCELLED | OUTPATIENT
Start: 2017-11-01

## 2017-10-30 RX ORDER — HYDROCODONE BITARTRATE AND ACETAMINOPHEN 500; 5 MG/1; MG/1
TABLET ORAL ONCE
Status: DISCONTINUED | OUTPATIENT
Start: 2017-10-30 | End: 2017-10-30 | Stop reason: HOSPADM

## 2017-10-30 RX ORDER — AZACITIDINE 100 MG/1
40 INJECTION, POWDER, LYOPHILIZED, FOR SOLUTION INTRAVENOUS; SUBCUTANEOUS
Status: CANCELLED | OUTPATIENT
Start: 2017-11-02

## 2017-10-30 RX ORDER — AZACITIDINE 100 MG/1
40 INJECTION, POWDER, LYOPHILIZED, FOR SOLUTION INTRAVENOUS; SUBCUTANEOUS
Status: CANCELLED | OUTPATIENT
Start: 2017-11-03

## 2017-10-30 RX ORDER — DIPHENHYDRAMINE HYDROCHLORIDE 50 MG/ML
25 INJECTION INTRAMUSCULAR; INTRAVENOUS
Status: CANCELLED | OUTPATIENT
Start: 2017-10-30

## 2017-10-30 RX ADMIN — AZACITIDINE 65 MG: 100 INJECTION, POWDER, LYOPHILIZED, FOR SOLUTION INTRAVENOUS; SUBCUTANEOUS at 12:10

## 2017-10-30 RX ADMIN — ACETAMINOPHEN 650 MG: 325 TABLET ORAL at 12:10

## 2017-10-30 RX ADMIN — DIPHENHYDRAMINE HYDROCHLORIDE 25 MG: 25 CAPSULE ORAL at 12:10

## 2017-10-30 NOTE — TELEPHONE ENCOUNTER
----- Message from Juan Wang MD sent at 10/30/2017 11:28 AM CDT -----  Please schedule patient for weekly Monday cbc, cmp, type and screen later morning/early afternoon for next 4 mondays starting on 11/6  -after labs should have MD appt and chair for vidaza injection for 7 days on 11/27

## 2017-10-30 NOTE — PLAN OF CARE
Problem: Patient Care Overview  Goal: Individualization & Mutuality  Outcome: Ongoing (interventions implemented as appropriate)  1200-Labs , hx, and medications reviewed, patient had lab work this morning for vidaza injection, will also be receiving platelets today. Assessment completed. Discussed plan of care with patient. Patient in agreement. Chair reclined and warm blanket and snack offered.

## 2017-10-30 NOTE — PLAN OF CARE
Problem: Patient Care Overview  Goal: Plan of Care Review  Patient tolerated treatment well, received platelet transfusion without issue and vidaza injection to abdomen. Discharged without complaints or S/S of adverse event. AVS given.  Instructed to call provider for any questions or concerns.

## 2017-10-31 ENCOUNTER — INFUSION (OUTPATIENT)
Dept: INFUSION THERAPY | Facility: HOSPITAL | Age: 82
End: 2017-10-31
Attending: INTERNAL MEDICINE
Payer: MEDICARE

## 2017-10-31 VITALS
HEART RATE: 74 BPM | RESPIRATION RATE: 20 BRPM | TEMPERATURE: 98 F | DIASTOLIC BLOOD PRESSURE: 78 MMHG | SYSTOLIC BLOOD PRESSURE: 168 MMHG

## 2017-10-31 DIAGNOSIS — D46.9 MDS (MYELODYSPLASTIC SYNDROME): Primary | ICD-10-CM

## 2017-10-31 PROCEDURE — 63600175 PHARM REV CODE 636 W HCPCS: Performed by: INTERNAL MEDICINE

## 2017-10-31 PROCEDURE — 96401 CHEMO ANTI-NEOPL SQ/IM: CPT

## 2017-10-31 RX ORDER — AZACITIDINE 100 MG/1
40 INJECTION, POWDER, LYOPHILIZED, FOR SOLUTION INTRAVENOUS; SUBCUTANEOUS
Status: COMPLETED | OUTPATIENT
Start: 2017-10-31 | End: 2017-10-31

## 2017-10-31 RX ADMIN — AZACITIDINE 65 MG: 100 INJECTION, POWDER, LYOPHILIZED, FOR SOLUTION INTRAVENOUS; SUBCUTANEOUS at 11:10

## 2017-10-31 NOTE — NURSING
Pt received Vidaza SQ with no complications. Pt hypertensive, but states she has not taken BP meds yet today and will take them when she arrives home. Pt instructed to call MD with any problems. Pt discharged home.

## 2017-11-01 ENCOUNTER — INFUSION (OUTPATIENT)
Dept: INFUSION THERAPY | Facility: HOSPITAL | Age: 82
End: 2017-11-01
Attending: INTERNAL MEDICINE
Payer: MEDICARE

## 2017-11-01 VITALS — DIASTOLIC BLOOD PRESSURE: 68 MMHG | HEART RATE: 61 BPM | SYSTOLIC BLOOD PRESSURE: 161 MMHG

## 2017-11-01 DIAGNOSIS — D46.9 MDS (MYELODYSPLASTIC SYNDROME): Primary | ICD-10-CM

## 2017-11-01 PROCEDURE — 63600175 PHARM REV CODE 636 W HCPCS: Mod: JW | Performed by: INTERNAL MEDICINE

## 2017-11-01 PROCEDURE — 96401 CHEMO ANTI-NEOPL SQ/IM: CPT

## 2017-11-01 RX ORDER — AZACITIDINE 100 MG/1
40 INJECTION, POWDER, LYOPHILIZED, FOR SOLUTION INTRAVENOUS; SUBCUTANEOUS
Status: COMPLETED | OUTPATIENT
Start: 2017-11-01 | End: 2017-11-01

## 2017-11-01 RX ADMIN — AZACITIDINE 65 MG: 100 INJECTION, POWDER, LYOPHILIZED, FOR SOLUTION INTRAVENOUS; SUBCUTANEOUS at 11:11

## 2017-11-01 NOTE — NURSING
"Patient here for day 3-yumiko-complains of "weak legs"-states not eating well-advised to eat small frequent meals and try liquid protein supplements-tolerated injection well.  "

## 2017-11-02 ENCOUNTER — INFUSION (OUTPATIENT)
Dept: INFUSION THERAPY | Facility: HOSPITAL | Age: 82
End: 2017-11-02
Attending: INTERNAL MEDICINE
Payer: MEDICARE

## 2017-11-02 VITALS — DIASTOLIC BLOOD PRESSURE: 63 MMHG | RESPIRATION RATE: 18 BRPM | SYSTOLIC BLOOD PRESSURE: 137 MMHG | HEART RATE: 58 BPM

## 2017-11-02 DIAGNOSIS — D46.9 MDS (MYELODYSPLASTIC SYNDROME): Primary | ICD-10-CM

## 2017-11-02 PROCEDURE — 63600175 PHARM REV CODE 636 W HCPCS: Performed by: INTERNAL MEDICINE

## 2017-11-02 PROCEDURE — 96401 CHEMO ANTI-NEOPL SQ/IM: CPT

## 2017-11-02 RX ORDER — AZACITIDINE 100 MG/1
40 INJECTION, POWDER, LYOPHILIZED, FOR SOLUTION INTRAVENOUS; SUBCUTANEOUS
Status: COMPLETED | OUTPATIENT
Start: 2017-11-02 | End: 2017-11-02

## 2017-11-02 RX ADMIN — AZACITIDINE 65 MG: 100 INJECTION, POWDER, LYOPHILIZED, FOR SOLUTION INTRAVENOUS; SUBCUTANEOUS at 01:11

## 2017-11-03 ENCOUNTER — INFUSION (OUTPATIENT)
Dept: INFUSION THERAPY | Facility: HOSPITAL | Age: 82
End: 2017-11-03
Attending: INTERNAL MEDICINE
Payer: MEDICARE

## 2017-11-03 DIAGNOSIS — D46.9 MDS (MYELODYSPLASTIC SYNDROME): Primary | ICD-10-CM

## 2017-11-03 PROCEDURE — 63600175 PHARM REV CODE 636 W HCPCS: Performed by: INTERNAL MEDICINE

## 2017-11-03 PROCEDURE — 96401 CHEMO ANTI-NEOPL SQ/IM: CPT

## 2017-11-03 RX ORDER — AZACITIDINE 100 MG/1
40 INJECTION, POWDER, LYOPHILIZED, FOR SOLUTION INTRAVENOUS; SUBCUTANEOUS
Status: COMPLETED | OUTPATIENT
Start: 2017-11-03 | End: 2017-11-03

## 2017-11-03 RX ADMIN — AZACITIDINE 65 MG: 100 INJECTION, POWDER, LYOPHILIZED, FOR SOLUTION INTRAVENOUS; SUBCUTANEOUS at 01:11

## 2017-11-03 NOTE — NURSING
Received Vidaza sq without difficulty.  Requested SW number for Dr. Wang which was provided by the RN.  RTC as scheduled.  NAD noted upon discharge.

## 2017-11-06 ENCOUNTER — INFUSION (OUTPATIENT)
Dept: INFUSION THERAPY | Facility: HOSPITAL | Age: 82
End: 2017-11-06
Attending: INTERNAL MEDICINE
Payer: MEDICARE

## 2017-11-06 ENCOUNTER — TELEPHONE (OUTPATIENT)
Dept: HEMATOLOGY/ONCOLOGY | Facility: CLINIC | Age: 82
End: 2017-11-06

## 2017-11-06 VITALS — DIASTOLIC BLOOD PRESSURE: 66 MMHG | SYSTOLIC BLOOD PRESSURE: 145 MMHG | RESPIRATION RATE: 18 BRPM | HEART RATE: 65 BPM

## 2017-11-06 VITALS
HEART RATE: 54 BPM | SYSTOLIC BLOOD PRESSURE: 163 MMHG | DIASTOLIC BLOOD PRESSURE: 70 MMHG | TEMPERATURE: 98 F | RESPIRATION RATE: 16 BRPM

## 2017-11-06 DIAGNOSIS — D46.9 MDS (MYELODYSPLASTIC SYNDROME): Primary | ICD-10-CM

## 2017-11-06 DIAGNOSIS — D61.818 PANCYTOPENIA: ICD-10-CM

## 2017-11-06 DIAGNOSIS — D46.9 MDS (MYELODYSPLASTIC SYNDROME): ICD-10-CM

## 2017-11-06 LAB
BLD PROD TYP BPU: NORMAL
BLOOD UNIT EXPIRATION DATE: NORMAL
BLOOD UNIT TYPE CODE: 6200
BLOOD UNIT TYPE: NORMAL
CODING SYSTEM: NORMAL
DISPENSE STATUS: NORMAL
NUM UNITS TRANS WBC-POOR PLATPHERESIS: NORMAL

## 2017-11-06 PROCEDURE — P9037 PLATE PHERES LEUKOREDU IRRAD: HCPCS

## 2017-11-06 PROCEDURE — 63600175 PHARM REV CODE 636 W HCPCS: Performed by: INTERNAL MEDICINE

## 2017-11-06 PROCEDURE — 96401 CHEMO ANTI-NEOPL SQ/IM: CPT

## 2017-11-06 PROCEDURE — 25000003 PHARM REV CODE 250: Performed by: NURSE PRACTITIONER

## 2017-11-06 PROCEDURE — 36430 TRANSFUSION BLD/BLD COMPNT: CPT

## 2017-11-06 RX ORDER — HYDROCODONE BITARTRATE AND ACETAMINOPHEN 500; 5 MG/1; MG/1
TABLET ORAL ONCE
Status: COMPLETED | OUTPATIENT
Start: 2017-11-06 | End: 2017-11-06

## 2017-11-06 RX ORDER — ACETAMINOPHEN 325 MG/1
650 TABLET ORAL
Status: COMPLETED | OUTPATIENT
Start: 2017-11-06 | End: 2017-11-06

## 2017-11-06 RX ORDER — AZACITIDINE 100 MG/1
40 INJECTION, POWDER, LYOPHILIZED, FOR SOLUTION INTRAVENOUS; SUBCUTANEOUS
Status: COMPLETED | OUTPATIENT
Start: 2017-11-06 | End: 2017-11-06

## 2017-11-06 RX ORDER — DIPHENHYDRAMINE HCL 25 MG
25 CAPSULE ORAL
Status: COMPLETED | OUTPATIENT
Start: 2017-11-06 | End: 2017-11-06

## 2017-11-06 RX ORDER — DIPHENHYDRAMINE HCL 25 MG
25 CAPSULE ORAL
Status: CANCELLED | OUTPATIENT
Start: 2017-11-06

## 2017-11-06 RX ORDER — ACETAMINOPHEN 325 MG/1
650 TABLET ORAL
Status: CANCELLED | OUTPATIENT
Start: 2017-11-06

## 2017-11-06 RX ORDER — HYDROCODONE BITARTRATE AND ACETAMINOPHEN 500; 5 MG/1; MG/1
TABLET ORAL ONCE
Status: CANCELLED | OUTPATIENT
Start: 2017-11-06 | End: 2017-11-06

## 2017-11-06 RX ADMIN — SODIUM CHLORIDE: 0.9 INJECTION, SOLUTION INTRAVENOUS at 03:11

## 2017-11-06 RX ADMIN — AZACITIDINE 65 MG: 100 INJECTION, POWDER, LYOPHILIZED, FOR SOLUTION INTRAVENOUS; SUBCUTANEOUS at 01:11

## 2017-11-06 RX ADMIN — DIPHENHYDRAMINE HYDROCHLORIDE 25 MG: 25 CAPSULE ORAL at 03:11

## 2017-11-06 RX ADMIN — ACETAMINOPHEN 650 MG: 325 TABLET ORAL at 03:11

## 2017-11-06 NOTE — NURSING
Vidaza injection given without incidence, tolerated well.  Discharged to next appt to receive platelets today at 3, verbalized understanding.

## 2017-11-06 NOTE — PLAN OF CARE
Problem: Chemotherapy Effects (Adult)  Goal: Signs and Symptoms of Listed Potential Problems Will be Absent, Minimized or Managed (Chemotherapy Effects)  Signs and symptoms of listed potential problems will be absent, minimized or managed by discharge/transition of care (reference Chemotherapy Effects (Adult) CPG).   Outcome: Ongoing (interventions implemented as appropriate)  Pt here for one unit plts.  Assessment complete and labs reviewed. VSS.  Chair reclined and blanket offered.  No needs expressed at this time.  Will continue to monitor.

## 2017-11-07 ENCOUNTER — INFUSION (OUTPATIENT)
Dept: INFUSION THERAPY | Facility: HOSPITAL | Age: 82
End: 2017-11-07
Attending: INTERNAL MEDICINE
Payer: MEDICARE

## 2017-11-07 ENCOUNTER — OFFICE VISIT (OUTPATIENT)
Dept: HEMATOLOGY/ONCOLOGY | Facility: CLINIC | Age: 82
End: 2017-11-07
Payer: MEDICARE

## 2017-11-07 VITALS — SYSTOLIC BLOOD PRESSURE: 135 MMHG | DIASTOLIC BLOOD PRESSURE: 59 MMHG | HEART RATE: 77 BPM | RESPIRATION RATE: 18 BRPM

## 2017-11-07 DIAGNOSIS — D46.9 MDS (MYELODYSPLASTIC SYNDROME): Primary | ICD-10-CM

## 2017-11-07 DIAGNOSIS — T45.1X5A CINV (CHEMOTHERAPY-INDUCED NAUSEA AND VOMITING): ICD-10-CM

## 2017-11-07 DIAGNOSIS — D61.818 PANCYTOPENIA: ICD-10-CM

## 2017-11-07 DIAGNOSIS — D46.Z MDS (MYELODYSPLASTIC SYNDROME), HIGH GRADE: Primary | ICD-10-CM

## 2017-11-07 DIAGNOSIS — L03.90 CELLULITIS, UNSPECIFIED CELLULITIS SITE: ICD-10-CM

## 2017-11-07 DIAGNOSIS — R11.2 CINV (CHEMOTHERAPY-INDUCED NAUSEA AND VOMITING): ICD-10-CM

## 2017-11-07 DIAGNOSIS — L03.116 CELLULITIS OF LEFT LOWER EXTREMITY: Primary | ICD-10-CM

## 2017-11-07 PROCEDURE — 63600175 PHARM REV CODE 636 W HCPCS: Performed by: INTERNAL MEDICINE

## 2017-11-07 PROCEDURE — 99213 OFFICE O/P EST LOW 20 MIN: CPT | Mod: S$GLB,,, | Performed by: INTERNAL MEDICINE

## 2017-11-07 PROCEDURE — 96401 CHEMO ANTI-NEOPL SQ/IM: CPT

## 2017-11-07 RX ORDER — ONDANSETRON HYDROCHLORIDE 8 MG/1
8 TABLET, FILM COATED ORAL EVERY 12 HOURS PRN
Qty: 30 TABLET | Refills: 2 | Status: SHIPPED | OUTPATIENT
Start: 2017-11-07 | End: 2018-11-07

## 2017-11-07 RX ORDER — AZACITIDINE 100 MG/1
40 INJECTION, POWDER, LYOPHILIZED, FOR SOLUTION INTRAVENOUS; SUBCUTANEOUS
Status: COMPLETED | OUTPATIENT
Start: 2017-11-07 | End: 2017-11-07

## 2017-11-07 RX ORDER — CLINDAMYCIN HYDROCHLORIDE 300 MG/1
300 CAPSULE ORAL 3 TIMES DAILY
Qty: 30 CAPSULE | Refills: 0 | Status: ON HOLD | OUTPATIENT
Start: 2017-11-07 | End: 2017-11-16 | Stop reason: ALTCHOICE

## 2017-11-07 RX ADMIN — AZACITIDINE 65 MG: 100 INJECTION, POWDER, LYOPHILIZED, FOR SOLUTION INTRAVENOUS; SUBCUTANEOUS at 11:11

## 2017-11-07 NOTE — PROGRESS NOTES
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 Endorsed painful, red, left foot.   Requested urgent care appt in clinic.   On exam top of foot with erythema and tenderness c/w with cellulitis; nonflunctuant.  . VSS.  Nontoxic appearing.  Prescribed clindamycin 300mg TID x 10 days. Continue acyc, fluc, cipro ppx.  Instructed to  antibiotic and begin today.  Instructed to call clinic if erythema, pain, swelling worsen.   Keep previously scheduled weekly labs and fu with me on 11/27 for her next cycle of vidaza.    Plan for post cycle 4 bone marrow biopsy to assess response.  Ankit Wang MD  Hematology & Stem Cell Transplant

## 2017-11-13 ENCOUNTER — TELEPHONE (OUTPATIENT)
Dept: HEMATOLOGY/ONCOLOGY | Facility: CLINIC | Age: 82
End: 2017-11-13

## 2017-11-13 ENCOUNTER — OFFICE VISIT (OUTPATIENT)
Dept: HEMATOLOGY/ONCOLOGY | Facility: CLINIC | Age: 82
DRG: 603 | End: 2017-11-13
Payer: MEDICARE

## 2017-11-13 ENCOUNTER — HOSPITAL ENCOUNTER (INPATIENT)
Facility: HOSPITAL | Age: 82
LOS: 3 days | Discharge: HOME | DRG: 603 | End: 2017-11-16
Attending: INTERNAL MEDICINE | Admitting: INTERNAL MEDICINE
Payer: MEDICARE

## 2017-11-13 DIAGNOSIS — D46.Z MDS (MYELODYSPLASTIC SYNDROME), HIGH GRADE: ICD-10-CM

## 2017-11-13 DIAGNOSIS — L03.90 CELLULITIS: ICD-10-CM

## 2017-11-13 DIAGNOSIS — D70.9 NEUTROPENIA, UNSPECIFIED TYPE: ICD-10-CM

## 2017-11-13 DIAGNOSIS — L03.90 CELLULITIS, UNSPECIFIED CELLULITIS SITE: ICD-10-CM

## 2017-11-13 DIAGNOSIS — D70.9 NEUTROPENIA, UNSPECIFIED TYPE: Primary | ICD-10-CM

## 2017-11-13 LAB
BLD PROD TYP BPU: NORMAL
BLD PROD TYP BPU: NORMAL
BLOOD UNIT EXPIRATION DATE: NORMAL
BLOOD UNIT EXPIRATION DATE: NORMAL
BLOOD UNIT TYPE CODE: 5100
BLOOD UNIT TYPE CODE: 5100
BLOOD UNIT TYPE: NORMAL
BLOOD UNIT TYPE: NORMAL
CODING SYSTEM: NORMAL
CODING SYSTEM: NORMAL
DISPENSE STATUS: NORMAL
DISPENSE STATUS: NORMAL
ESTIMATED AVG GLUCOSE: 131 MG/DL
HBA1C MFR BLD HPLC: 6.2 %
NUM UNITS TRANS PACKED RBC: NORMAL
NUM UNITS TRANS WBC-POOR PLATPHERESIS: NORMAL

## 2017-11-13 PROCEDURE — 99223 1ST HOSP IP/OBS HIGH 75: CPT | Mod: AI,,, | Performed by: INTERNAL MEDICINE

## 2017-11-13 PROCEDURE — 63600175 PHARM REV CODE 636 W HCPCS: Performed by: INTERNAL MEDICINE

## 2017-11-13 PROCEDURE — 25000003 PHARM REV CODE 250: Performed by: INTERNAL MEDICINE

## 2017-11-13 PROCEDURE — P9037 PLATE PHERES LEUKOREDU IRRAD: HCPCS

## 2017-11-13 PROCEDURE — 27201040 HC RC 50 FILTER

## 2017-11-13 PROCEDURE — P9038 RBC IRRADIATED: HCPCS

## 2017-11-13 PROCEDURE — 99213 OFFICE O/P EST LOW 20 MIN: CPT | Mod: S$GLB,,, | Performed by: INTERNAL MEDICINE

## 2017-11-13 PROCEDURE — 36430 TRANSFUSION BLD/BLD COMPNT: CPT

## 2017-11-13 PROCEDURE — 36415 COLL VENOUS BLD VENIPUNCTURE: CPT

## 2017-11-13 PROCEDURE — 83036 HEMOGLOBIN GLYCOSYLATED A1C: CPT

## 2017-11-13 PROCEDURE — 86644 CMV ANTIBODY: CPT

## 2017-11-13 PROCEDURE — 20600001 HC STEP DOWN PRIVATE ROOM

## 2017-11-13 PROCEDURE — 86920 COMPATIBILITY TEST SPIN: CPT

## 2017-11-13 PROCEDURE — 87040 BLOOD CULTURE FOR BACTERIA: CPT | Mod: 59

## 2017-11-13 RX ORDER — VERAPAMIL HYDROCHLORIDE 240 MG/1
240 TABLET, FILM COATED, EXTENDED RELEASE ORAL NIGHTLY
Status: DISCONTINUED | OUTPATIENT
Start: 2017-11-13 | End: 2017-11-16 | Stop reason: HOSPADM

## 2017-11-13 RX ORDER — SODIUM CHLORIDE 0.9 % (FLUSH) 0.9 %
5 SYRINGE (ML) INJECTION
Status: DISCONTINUED | OUTPATIENT
Start: 2017-11-13 | End: 2017-11-16 | Stop reason: HOSPADM

## 2017-11-13 RX ORDER — ACETAMINOPHEN 325 MG/1
650 TABLET ORAL EVERY 6 HOURS PRN
Status: DISCONTINUED | OUTPATIENT
Start: 2017-11-13 | End: 2017-11-14

## 2017-11-13 RX ORDER — HYDROCODONE BITARTRATE AND ACETAMINOPHEN 500; 5 MG/1; MG/1
TABLET ORAL
Status: DISCONTINUED | OUTPATIENT
Start: 2017-11-13 | End: 2017-11-14

## 2017-11-13 RX ORDER — FLUCONAZOLE 200 MG/1
200 TABLET ORAL DAILY
Status: DISCONTINUED | OUTPATIENT
Start: 2017-11-13 | End: 2017-11-16 | Stop reason: HOSPADM

## 2017-11-13 RX ORDER — GLUCAGON 1 MG
1 KIT INJECTION
Status: DISCONTINUED | OUTPATIENT
Start: 2017-11-13 | End: 2017-11-16 | Stop reason: HOSPADM

## 2017-11-13 RX ORDER — DIPHENHYDRAMINE HCL 25 MG
25 CAPSULE ORAL EVERY 6 HOURS PRN
Status: DISCONTINUED | OUTPATIENT
Start: 2017-11-13 | End: 2017-11-16 | Stop reason: HOSPADM

## 2017-11-13 RX ORDER — DIPHENHYDRAMINE HYDROCHLORIDE 50 MG/ML
25 INJECTION INTRAMUSCULAR; INTRAVENOUS
Status: DISCONTINUED | OUTPATIENT
Start: 2017-11-13 | End: 2017-11-13

## 2017-11-13 RX ORDER — ONDANSETRON 8 MG/1
8 TABLET, ORALLY DISINTEGRATING ORAL EVERY 8 HOURS PRN
Status: DISCONTINUED | OUTPATIENT
Start: 2017-11-13 | End: 2017-11-16 | Stop reason: HOSPADM

## 2017-11-13 RX ORDER — VALSARTAN 40 MG/1
80 TABLET ORAL DAILY
Status: DISCONTINUED | OUTPATIENT
Start: 2017-11-13 | End: 2017-11-14

## 2017-11-13 RX ORDER — ACETAMINOPHEN 325 MG/1
650 TABLET ORAL
Status: DISCONTINUED | OUTPATIENT
Start: 2017-11-13 | End: 2017-11-13

## 2017-11-13 RX ORDER — AMOXICILLIN 250 MG
1 CAPSULE ORAL DAILY PRN
Status: DISCONTINUED | OUTPATIENT
Start: 2017-11-13 | End: 2017-11-16 | Stop reason: HOSPADM

## 2017-11-13 RX ORDER — ACYCLOVIR 200 MG/1
400 CAPSULE ORAL 2 TIMES DAILY
Status: DISCONTINUED | OUTPATIENT
Start: 2017-11-13 | End: 2017-11-16 | Stop reason: HOSPADM

## 2017-11-13 RX ORDER — HYDROCODONE BITARTRATE AND ACETAMINOPHEN 500; 5 MG/1; MG/1
TABLET ORAL ONCE
Status: DISCONTINUED | OUTPATIENT
Start: 2017-11-13 | End: 2017-11-14

## 2017-11-13 RX ORDER — LEVOTHYROXINE SODIUM 25 UG/1
25 TABLET ORAL
Status: DISCONTINUED | OUTPATIENT
Start: 2017-11-14 | End: 2017-11-16 | Stop reason: HOSPADM

## 2017-11-13 RX ORDER — HYDROCODONE BITARTRATE AND ACETAMINOPHEN 5; 325 MG/1; MG/1
1 TABLET ORAL EVERY 6 HOURS PRN
Status: DISCONTINUED | OUTPATIENT
Start: 2017-11-13 | End: 2017-11-16 | Stop reason: HOSPADM

## 2017-11-13 RX ORDER — VERAPAMIL HYDROCHLORIDE 120 MG/1
120 TABLET, FILM COATED, EXTENDED RELEASE ORAL NIGHTLY
Status: DISCONTINUED | OUTPATIENT
Start: 2017-11-13 | End: 2017-11-13

## 2017-11-13 RX ORDER — IBUPROFEN 200 MG
24 TABLET ORAL
Status: DISCONTINUED | OUTPATIENT
Start: 2017-11-13 | End: 2017-11-16 | Stop reason: HOSPADM

## 2017-11-13 RX ORDER — IBUPROFEN 200 MG
16 TABLET ORAL
Status: DISCONTINUED | OUTPATIENT
Start: 2017-11-13 | End: 2017-11-16 | Stop reason: HOSPADM

## 2017-11-13 RX ADMIN — DIPHENHYDRAMINE HYDROCHLORIDE 25 MG: 25 CAPSULE ORAL at 02:11

## 2017-11-13 RX ADMIN — VANCOMYCIN HYDROCHLORIDE 750 MG: 750 INJECTION, POWDER, LYOPHILIZED, FOR SOLUTION INTRAVENOUS at 03:11

## 2017-11-13 RX ADMIN — ACETAMINOPHEN 650 MG: 325 TABLET ORAL at 02:11

## 2017-11-13 RX ADMIN — VERAPAMIL HYDROCHLORIDE 240 MG: 240 TABLET, FILM COATED, EXTENDED RELEASE ORAL at 08:11

## 2017-11-13 RX ADMIN — ACYCLOVIR 400 MG: 200 CAPSULE ORAL at 08:11

## 2017-11-13 RX ADMIN — HYDROCODONE BITARTRATE AND ACETAMINOPHEN 1 TABLET: 5; 325 TABLET ORAL at 08:11

## 2017-11-13 NOTE — PROGRESS NOTES
Presents for FU for right foot cellulitis.  Worsening erythemia, swelling , and now extension to big toe compared to last week.  Has been compliant with po clindamycin.  VSS, non toxic appearing. Labs reviewed and remains pancytopenic/neutropenia.    Given progressive cellulitis in setting of neutropenia, admit for IV vancomycin and monitoring.  Transfuse 1 unit prbc and 1 unit plt inpatient.    Ankit Wang MD  Hematology & Stem Cell Transplant

## 2017-11-13 NOTE — ASSESSMENT & PLAN NOTE
· 81 yo f w/ MDS who presented worsening cellulitis of the right foot, despite oral antibiotics  · Blood cultures x 2 obtained  · Started on IV vancomycin w/ trough before 4th dose  · Monitor borders daily

## 2017-11-13 NOTE — ASSESSMENT & PLAN NOTE
· Unclear etiology. Likely HTN.   · Avoid nephrotoxic medications and IV contrast.   · Control BP.

## 2017-11-13 NOTE — H&P
[facility]  Hematology  Bone Marrow Transplant  H&P    Subjective:     Principal Problem: Cellulitis    HPI: Shara Rose is a 82 y.o. female w/ HTN, CKD, hypothyroidism, sickle cell trait and MDS who presents to the hospital as a direct admit for cellulitis (failed outpatient treatment). Initially diagnosed on 11/7 and prescribed clindamycin 300 mg TID. At that time she was complaining of pain, swelling and erythema. Denies any episodes of either fevers or chills. Does not recall any prior trauma, insect bite or any recent travel. No drainage or fluid collections have been noted. Has full range of her ankle, but has recently complained of worsening pain, swelling and erythema extending to her left toe. Has been compliant w/ PO clindamycin. Last received azacitidine on 11/7. , hgb 6.8 and plts 2 on her CBC w/ diff today.     Oncology Treatment Plan:   OP AZACITADINE 7-DAY (SUB-Q)      Oncology history:   Referred in July 2017 for a Hematology consultation for further evaluation of anemia. 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 Not a stem cell transplant candidate due to age. Started on Vidaza on 8/22. Finished cycle 3 last week. Plan for post cycle 4 bone marrow biopsy to assess response  She required admission from 10/14-10/19 for neutropenic fever attributed to CAP.     Patient information was obtained from patient and past medical records.     Oncology History: Contained in HPI     Prescriptions Prior to Admission   Medication Sig Dispense Refill Last Dose    acyclovir (ZOVIRAX) 200 MG capsule Take 2 capsules (400 mg total) by mouth 2 (two) times daily. 120 capsule 11 Taking    AZACITIDINE (VIDAZA INJ) Inject as directed.   Taking    ciprofloxacin HCl (CIPRO) 500 MG tablet Take 1 tablet (500 mg total) by mouth 2 (two) times daily. 60 tablet 0 Taking    clindamycin (CLEOCIN) 300 MG capsule Take 1 capsule (300 mg total) by  mouth 3 (three) times daily. 30 capsule 0     clotrimazole-betamethasone 1-0.05% (LOTRISONE) cream Apply topically 2 (two) times daily. 45 g 3 Taking    fluconazole (DIFLUCAN) 200 MG Tab Take 1 tablet (200 mg total) by mouth once daily. 30 tablet 0 Taking    levothyroxine (SYNTHROID) 25 MCG tablet TAKE 1 TABLET EVERY DAY 90 tablet 4 Taking    meclizine (ANTIVERT) 25 mg tablet TK 1 T PO BID FOR 10 DAYS PRF DIZZINESS  1 Taking    ondansetron (ZOFRAN) 8 MG tablet Take 1 tablet (8 mg total) by mouth every 12 (twelve) hours as needed for Nausea. 30 tablet 2     valsartan (DIOVAN) 80 MG tablet TAKE 1 TABLET ONE TIME DAILY 90 tablet 3 Taking    verapamil (CALAN-SR) 240 MG CR tablet TAKE 1 TABLET EVERY DAY 90 tablet 3 Taking       Sulfa (sulfonamide antibiotics)     Past Medical History:   Diagnosis Date    Allergy     Anemia     Arthritis     Cataract     CKD (chronic kidney disease)     Gout, unspecified     Hypertension     Hypothyroidism     MDS (myelodysplastic syndrome) 8/14/2017    Menopause     PNA (pneumonia) 10/15/2017    Sickle cell trait     Trigger finger      Past Surgical History:   Procedure Laterality Date    APPENDECTOMY      HEMORRHOID SURGERY      HYSTERECTOMY       Family History     Problem Relation (Age of Onset)    Cancer Son    Cataracts Father    Heart disease Father    Hypertension Mother    Peripheral vascular disease Father        Social History Main Topics    Smoking status: Former Smoker     Packs/day: 0.25     Years: 1.00     Quit date: 10/19/1972    Smokeless tobacco: Never Used    Alcohol use No    Drug use: No    Sexual activity: No       Review of Systems   Constitutional: Negative for activity change, appetite change, chills, fatigue, fever and unexpected weight change.   HENT: Negative for congestion.    Eyes: Negative for visual disturbance.   Respiratory: Negative for shortness of breath.    Cardiovascular: Negative for chest pain and leg swelling.    Gastrointestinal: Negative for abdominal pain, diarrhea and nausea.   Endocrine: Negative for cold intolerance and heat intolerance.   Genitourinary: Negative for dysuria and hematuria.   Musculoskeletal: Negative for arthralgias and myalgias.   Skin: Positive for rash.   Neurological: Negative for headaches.   Hematological: Bruises/bleeds easily.   Psychiatric/Behavioral: Negative for dysphoric mood and sleep disturbance.     Objective:     Vital Signs (Most Recent):  Temp: 97.5 °F (36.4 °C) (11/13/17 1314)  Pulse: 81 (11/13/17 1404)  Resp: 18 (11/13/17 1314)  BP: (!) 161/71 (11/13/17 1404)  SpO2: 100 % (11/13/17 1314) Vital Signs (24h Range):  Temp:  [97.5 °F (36.4 °C)] 97.5 °F (36.4 °C)  Pulse:  [81-84] 81  Resp:  [18] 18  SpO2:  [100 %] 100 %  BP: (161-197)/(71-81) 161/71     Weight: 54.7 kg (120 lb 9.5 oz)  Body mass index is 19.76 kg/m².  Body surface area is 1.59 meters squared.    ECOG SCORE         [unfilled]    Lines/Drains/Airways     Peripheral Intravenous Line                 Peripheral IV - Single Lumen 11/13/17 1359 Left Antecubital less than 1 day                Physical Exam   Constitutional: She is oriented to person, place, and time. She appears well-developed and well-nourished.   HENT:   Head: Normocephalic and atraumatic.   Eyes: Conjunctivae and EOM are normal. Pupils are equal, round, and reactive to light.   Neck: Normal range of motion. Neck supple.   Cardiovascular: Normal rate, regular rhythm, normal heart sounds and intact distal pulses.    Pulmonary/Chest: Effort normal and breath sounds normal.   Abdominal: Soft. Bowel sounds are normal. She exhibits no distension. There is no tenderness.   Musculoskeletal: Normal range of motion. She exhibits no edema.   Neurological: She is alert and oriented to person, place, and time.   Skin: Skin is warm and dry. Capillary refill takes less than 2 seconds. Rash noted. There is erythema.   Erythema, swelling and tenderness noted on the dorsal  aspect of the right foot, extended to the great toe.   Psychiatric: She has a normal mood and affect. Her behavior is normal. Judgment and thought content normal.           Significant Labs:   CBC:   Recent Labs  Lab 11/13/17  1153   WBC 1.11*   HGB 6.8*   HCT 19.9*   PLT 2*    and CMP:   Recent Labs  Lab 11/13/17  1118      K 3.9      CO2 26      BUN 11   CREATININE 1.1   CALCIUM 9.7   PROT 8.5*   ALBUMIN 2.9*   BILITOT 0.4   ALKPHOS 94   AST 17   ALT 7*   ANIONGAP 5*   EGFRNONAA 47.2*     Assessment/Plan:     * Cellulitis    · 83 yo f w/ MDS who presented worsening cellulitis of the right foot, despite oral antibiotics  · Blood cultures x 2 obtained  · Started on IV vancomycin w/ trough before 4th dose  · Monitor borders daily        MDS (myelodysplastic syndrome)    · Recent diagnosis w/ bone cam in 8/3. 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  · Not a stem cell transplant candidate due to age.  · Not a stem cell transplant candidate due to age. Started on Vidaza on 8/22. Finished cycle 3 last week. Plan for post cycle 4 bone marrow biopsy to assess response        Stage 2 chronic kidney disease    · Unclear etiology. Likely HTN.   · Avoid nephrotoxic medications and IV contrast.   · Control BP.        Hypothyroidism    · Levothyroxine 25 mcg daily. Last TSH wnl.         Hypertension    · Resumed both valsartan 80 mg and verapamil 240 mg nightly.         Pancytopenia      Recent Labs  Lab 11/13/17  1153   WBC 1.11*   RBC 2.30*   HGB 6.8*   HCT 19.9*   PLT 2*   MCV 87   MCH 29.6   MCHC 34.2 ·      · Requires both pRBCs and platelets today  · Monitor w/ daily CBCs and transfuse for plts <10k and hgb <7            VTE Risk Mitigation         Ordered     Medium Risk of VTE  Once      11/13/17 1549     Reason for no Mechanical VTE Prophylaxis  Once      11/13/17 1549          Disposition: Pending improvement in cellulitis.     Juan Daniel Hsu, DO  Bone Marrow  Transplant  Hematology  [facility]

## 2017-11-13 NOTE — PLAN OF CARE
Problem: Patient Care Overview  Goal: Plan of Care Review  Outcome: Ongoing (interventions implemented as appropriate)  Pt remains free from injury. Pt calls when needing assistance getting out of bed. Pt reports pain to R foot with movement, interventions offered, non needed. Commode placed at bedside. Pt received one unit of platelets. Pt started on IV antibiotics. Pt to receive one unit of blood after antibiotic.  No acute events during the day. Will continue to monitor

## 2017-11-13 NOTE — SUBJECTIVE & OBJECTIVE
Patient information was obtained from patient and past medical records.     Oncology History: Contained in HPI     Prescriptions Prior to Admission   Medication Sig Dispense Refill Last Dose    acyclovir (ZOVIRAX) 200 MG capsule Take 2 capsules (400 mg total) by mouth 2 (two) times daily. 120 capsule 11 Taking    AZACITIDINE (VIDAZA INJ) Inject as directed.   Taking    ciprofloxacin HCl (CIPRO) 500 MG tablet Take 1 tablet (500 mg total) by mouth 2 (two) times daily. 60 tablet 0 Taking    clindamycin (CLEOCIN) 300 MG capsule Take 1 capsule (300 mg total) by mouth 3 (three) times daily. 30 capsule 0     clotrimazole-betamethasone 1-0.05% (LOTRISONE) cream Apply topically 2 (two) times daily. 45 g 3 Taking    fluconazole (DIFLUCAN) 200 MG Tab Take 1 tablet (200 mg total) by mouth once daily. 30 tablet 0 Taking    levothyroxine (SYNTHROID) 25 MCG tablet TAKE 1 TABLET EVERY DAY 90 tablet 4 Taking    meclizine (ANTIVERT) 25 mg tablet TK 1 T PO BID FOR 10 DAYS PRF DIZZINESS  1 Taking    ondansetron (ZOFRAN) 8 MG tablet Take 1 tablet (8 mg total) by mouth every 12 (twelve) hours as needed for Nausea. 30 tablet 2     valsartan (DIOVAN) 80 MG tablet TAKE 1 TABLET ONE TIME DAILY 90 tablet 3 Taking    verapamil (CALAN-SR) 240 MG CR tablet TAKE 1 TABLET EVERY DAY 90 tablet 3 Taking       Sulfa (sulfonamide antibiotics)     Past Medical History:   Diagnosis Date    Allergy     Anemia     Arthritis     Cataract     CKD (chronic kidney disease)     Gout, unspecified     Hypertension     Hypothyroidism     MDS (myelodysplastic syndrome) 8/14/2017    Menopause     PNA (pneumonia) 10/15/2017    Sickle cell trait     Trigger finger      Past Surgical History:   Procedure Laterality Date    APPENDECTOMY      HEMORRHOID SURGERY      HYSTERECTOMY       Family History     Problem Relation (Age of Onset)    Cancer Son    Cataracts Father    Heart disease Father    Hypertension Mother    Peripheral vascular disease  Father        Social History Main Topics    Smoking status: Former Smoker     Packs/day: 0.25     Years: 1.00     Quit date: 10/19/1972    Smokeless tobacco: Never Used    Alcohol use No    Drug use: No    Sexual activity: No       Review of Systems   Constitutional: Negative for activity change, appetite change, chills, fatigue, fever and unexpected weight change.   HENT: Negative for congestion.    Eyes: Negative for visual disturbance.   Respiratory: Negative for shortness of breath.    Cardiovascular: Negative for chest pain and leg swelling.   Gastrointestinal: Negative for abdominal pain, diarrhea and nausea.   Endocrine: Negative for cold intolerance and heat intolerance.   Genitourinary: Negative for dysuria and hematuria.   Musculoskeletal: Negative for arthralgias and myalgias.   Skin: Positive for rash.   Neurological: Negative for headaches.   Hematological: Bruises/bleeds easily.   Psychiatric/Behavioral: Negative for dysphoric mood and sleep disturbance.     Objective:     Vital Signs (Most Recent):  Temp: 97.5 °F (36.4 °C) (11/13/17 1314)  Pulse: 81 (11/13/17 1404)  Resp: 18 (11/13/17 1314)  BP: (!) 161/71 (11/13/17 1404)  SpO2: 100 % (11/13/17 1314) Vital Signs (24h Range):  Temp:  [97.5 °F (36.4 °C)] 97.5 °F (36.4 °C)  Pulse:  [81-84] 81  Resp:  [18] 18  SpO2:  [100 %] 100 %  BP: (161-197)/(71-81) 161/71     Weight: 54.7 kg (120 lb 9.5 oz)  Body mass index is 19.76 kg/m².  Body surface area is 1.59 meters squared.    ECOG SCORE         [unfilled]    Lines/Drains/Airways     Peripheral Intravenous Line                 Peripheral IV - Single Lumen 11/13/17 1359 Left Antecubital less than 1 day                Physical Exam   Constitutional: She is oriented to person, place, and time. She appears well-developed and well-nourished.   HENT:   Head: Normocephalic and atraumatic.   Eyes: Conjunctivae and EOM are normal. Pupils are equal, round, and reactive to light.   Neck: Normal range of motion. Neck  supple.   Cardiovascular: Normal rate, regular rhythm, normal heart sounds and intact distal pulses.    Pulmonary/Chest: Effort normal and breath sounds normal.   Abdominal: Soft. Bowel sounds are normal. She exhibits no distension. There is no tenderness.   Musculoskeletal: Normal range of motion. She exhibits no edema.   Neurological: She is alert and oriented to person, place, and time.   Skin: Skin is warm and dry. Capillary refill takes less than 2 seconds. Rash noted. There is erythema.   Erythema, swelling and tenderness noted on the dorsal aspect of the right foot, extended to the great toe.   Psychiatric: She has a normal mood and affect. Her behavior is normal. Judgment and thought content normal.           Significant Labs:   CBC:   Recent Labs  Lab 11/13/17  1153   WBC 1.11*   HGB 6.8*   HCT 19.9*   PLT 2*    and CMP:   Recent Labs  Lab 11/13/17  1118      K 3.9      CO2 26      BUN 11   CREATININE 1.1   CALCIUM 9.7   PROT 8.5*   ALBUMIN 2.9*   BILITOT 0.4   ALKPHOS 94   AST 17   ALT 7*   ANIONGAP 5*   EGFRNONAA 47.2*

## 2017-11-13 NOTE — ASSESSMENT & PLAN NOTE
Recent Labs  Lab 11/13/17  1153   WBC 1.11*   RBC 2.30*   HGB 6.8*   HCT 19.9*   PLT 2*   MCV 87   MCH 29.6   MCHC 34.2   ·    · Requires both pRBCs and platelets today  · Monitor w/ daily CBCs and transfuse for plts <10k and hgb <7

## 2017-11-13 NOTE — HPI
Shara Rose is a 82 y.o. female w/ HTN, CKD, hypothyroidism, sickle cell trait and MDS who presented to the hospital as a direct admit for cellulitis (failed outpatient treatment). Initially diagnosed on 11/7 and prescribed clindamycin 300 mg TID. At that time she was complaining of pain, swelling and erythema. Denied any episodes of either fevers or chills. Did not recall any prior trauma, insect bite or any recent travel. No drainage or fluid collections have been noted. Had full range of her ankle, but had recently complained of worsening pain, swelling and erythema extending to her left toe. Had been compliant w/ PO clindamycin. Last received azacitidine on 11/7. , hgb 6.8 and plts 2 on her CBC w/ diff on admission.    Oncology Treatment Plan:   OP AZACITADINE 7-DAY (SUB-Q)      Oncology history:   Referred in July 2017 for a Hematology consultation for further evaluation of anemia. 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 Not a stem cell transplant candidate due to age. Started on Vidaza on 8/22. Finished cycle 3 last week. Plan for post cycle 4 bone marrow biopsy to assess response  She required admission from 10/14-10/19 for neutropenic fever attributed to CAP.

## 2017-11-13 NOTE — ASSESSMENT & PLAN NOTE
· Recent diagnosis w/ bone cam in 8/3. 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  · Not a stem cell transplant candidate due to age.  · Not a stem cell transplant candidate due to age. Started on Vidaza on 8/22. Finished cycle 3 last week. Plan for post cycle 4 bone marrow biopsy to assess response

## 2017-11-13 NOTE — HOSPITAL COURSE
11/13/2017 Direct admit from BMT clinic for failed outpatient therapy of cellulitis. Blood cultures x 2 obtained. Started on IV vancomycin. Given 1 unit of pRBCs and plts.   11/14/17: Afebrile. Hypertensive with SBP in 190s- increased valasartan dose. PRN hydralazine. Pancytopenic not requiring infusions today. Complains of R foot pain and patient stating she cannot walk today due to pain. PT/OT orders to start tomorrow because patient refusing today.   11/15/17: Afebrile. Blood cultures NGTD. Vanc discontinued and switched to cipro PPX and doxycycline x7day course for possible skin infection. Pancytopenic not requiring transfusions. R foot xray 11/14/17 shows mild arthritic changes at 1st MTP joint and possible gout with small calcifications surrounding MTP joint and mild soft tissue swelling around this joint. Pt states her pain is only located in the R MTP joint and great toe. Pt states pain is improving and similar to previous gout flare pain. Colchicine x1 given yesterday. Uric acid down to 5.5 today. Will start prednisone taper over 7 days. NSAID scheduled. Likely DC tomorrow. Will need allopurinol at discharge.   11/16/2017: Remained afebrile. Infectious work-up unremarkable. D/c today w/ prednisone taper and doxycycline. Antibiotic for 7 days. Pain improving.

## 2017-11-14 PROBLEM — M10.9 ACUTE GOUT INVOLVING TOE OF RIGHT FOOT: Status: ACTIVE | Noted: 2017-11-14

## 2017-11-14 LAB
ALBUMIN SERPL BCP-MCNC: 2.6 G/DL
ALP SERPL-CCNC: 88 U/L
ALT SERPL W/O P-5'-P-CCNC: <5 U/L
ANION GAP SERPL CALC-SCNC: 6 MMOL/L
ANISOCYTOSIS BLD QL SMEAR: SLIGHT
AST SERPL-CCNC: 14 U/L
BASOPHILS # BLD AUTO: 0 K/UL
BASOPHILS NFR BLD: 0 %
BILIRUB SERPL-MCNC: 0.6 MG/DL
BUN SERPL-MCNC: 15 MG/DL
CALCIUM SERPL-MCNC: 9.2 MG/DL
CHLORIDE SERPL-SCNC: 110 MMOL/L
CO2 SERPL-SCNC: 27 MMOL/L
CREAT SERPL-MCNC: 1 MG/DL
DIFFERENTIAL METHOD: ABNORMAL
EOSINOPHIL # BLD AUTO: 0 K/UL
EOSINOPHIL NFR BLD: 2.3 %
ERYTHROCYTE [DISTWIDTH] IN BLOOD BY AUTOMATED COUNT: 14 %
EST. GFR  (AFRICAN AMERICAN): >60 ML/MIN/1.73 M^2
EST. GFR  (NON AFRICAN AMERICAN): 53 ML/MIN/1.73 M^2
GLUCOSE SERPL-MCNC: 90 MG/DL
HCT VFR BLD AUTO: 22.2 %
HGB BLD-MCNC: 7.5 G/DL
HYPOCHROMIA BLD QL SMEAR: ABNORMAL
IMM GRANULOCYTES # BLD AUTO: 0.01 K/UL
IMM GRANULOCYTES NFR BLD AUTO: 0.8 %
LYMPHOCYTES # BLD AUTO: 1 K/UL
LYMPHOCYTES NFR BLD: 73.8 %
MAGNESIUM SERPL-MCNC: 2 MG/DL
MCH RBC QN AUTO: 29 PG
MCHC RBC AUTO-ENTMCNC: 33.8 G/DL
MCV RBC AUTO: 86 FL
MONOCYTES # BLD AUTO: 0 K/UL
MONOCYTES NFR BLD: 1.5 %
NEUTROPHILS # BLD AUTO: 0.3 K/UL
NEUTROPHILS NFR BLD: 21.6 %
NRBC BLD-RTO: 0 /100 WBC
OVALOCYTES BLD QL SMEAR: ABNORMAL
PHOSPHATE SERPL-MCNC: 3.6 MG/DL
PLATELET # BLD AUTO: 61 K/UL
PMV BLD AUTO: 11.8 FL
POIKILOCYTOSIS BLD QL SMEAR: SLIGHT
POLYCHROMASIA BLD QL SMEAR: ABNORMAL
POTASSIUM SERPL-SCNC: 3.8 MMOL/L
PROT SERPL-MCNC: 7.6 G/DL
RBC # BLD AUTO: 2.59 M/UL
SODIUM SERPL-SCNC: 143 MMOL/L
URATE SERPL-MCNC: 6.4 MG/DL
WBC # BLD AUTO: 1.3 K/UL

## 2017-11-14 PROCEDURE — 25000003 PHARM REV CODE 250: Performed by: INTERNAL MEDICINE

## 2017-11-14 PROCEDURE — 63600175 PHARM REV CODE 636 W HCPCS: Performed by: INTERNAL MEDICINE

## 2017-11-14 PROCEDURE — 84100 ASSAY OF PHOSPHORUS: CPT

## 2017-11-14 PROCEDURE — 99233 SBSQ HOSP IP/OBS HIGH 50: CPT | Mod: ,,, | Performed by: INTERNAL MEDICINE

## 2017-11-14 PROCEDURE — 83735 ASSAY OF MAGNESIUM: CPT

## 2017-11-14 PROCEDURE — 85025 COMPLETE CBC W/AUTO DIFF WBC: CPT

## 2017-11-14 PROCEDURE — 20600001 HC STEP DOWN PRIVATE ROOM

## 2017-11-14 PROCEDURE — 36415 COLL VENOUS BLD VENIPUNCTURE: CPT

## 2017-11-14 PROCEDURE — 25000003 PHARM REV CODE 250: Performed by: NURSE PRACTITIONER

## 2017-11-14 PROCEDURE — 84550 ASSAY OF BLOOD/URIC ACID: CPT

## 2017-11-14 PROCEDURE — 80053 COMPREHEN METABOLIC PANEL: CPT

## 2017-11-14 RX ORDER — HYDRALAZINE HYDROCHLORIDE 20 MG/ML
10 INJECTION INTRAMUSCULAR; INTRAVENOUS EVERY 8 HOURS PRN
Status: DISCONTINUED | OUTPATIENT
Start: 2017-11-14 | End: 2017-11-14

## 2017-11-14 RX ORDER — ACETAMINOPHEN 325 MG/1
650 TABLET ORAL EVERY 6 HOURS PRN
Status: DISCONTINUED | OUTPATIENT
Start: 2017-11-14 | End: 2017-11-16 | Stop reason: HOSPADM

## 2017-11-14 RX ORDER — VALSARTAN 160 MG/1
160 TABLET ORAL DAILY
Status: DISCONTINUED | OUTPATIENT
Start: 2017-11-15 | End: 2017-11-16 | Stop reason: HOSPADM

## 2017-11-14 RX ORDER — HYDRALAZINE HYDROCHLORIDE 10 MG/1
10 TABLET, FILM COATED ORAL EVERY 8 HOURS PRN
Status: DISCONTINUED | OUTPATIENT
Start: 2017-11-14 | End: 2017-11-16 | Stop reason: HOSPADM

## 2017-11-14 RX ORDER — ALLOPURINOL 300 MG/1
300 TABLET ORAL DAILY
Status: DISCONTINUED | OUTPATIENT
Start: 2017-11-14 | End: 2017-11-14

## 2017-11-14 RX ORDER — COLCHICINE 0.6 MG/1
0.6 TABLET, FILM COATED ORAL ONCE
Status: COMPLETED | OUTPATIENT
Start: 2017-11-14 | End: 2017-11-14

## 2017-11-14 RX ORDER — ALLOPURINOL 100 MG/1
100 TABLET ORAL DAILY
Status: DISCONTINUED | OUTPATIENT
Start: 2017-11-14 | End: 2017-11-14

## 2017-11-14 RX ORDER — IBUPROFEN 400 MG/1
400 TABLET ORAL EVERY 6 HOURS PRN
Status: DISCONTINUED | OUTPATIENT
Start: 2017-11-14 | End: 2017-11-15

## 2017-11-14 RX ORDER — HYDROMORPHONE HYDROCHLORIDE 1 MG/ML
0.5 INJECTION, SOLUTION INTRAMUSCULAR; INTRAVENOUS; SUBCUTANEOUS
Status: DISCONTINUED | OUTPATIENT
Start: 2017-11-14 | End: 2017-11-15

## 2017-11-14 RX ADMIN — LEVOTHYROXINE SODIUM 25 MCG: 25 TABLET ORAL at 06:11

## 2017-11-14 RX ADMIN — VERAPAMIL HYDROCHLORIDE 240 MG: 240 TABLET, FILM COATED, EXTENDED RELEASE ORAL at 09:11

## 2017-11-14 RX ADMIN — ALLOPURINOL 300 MG: 300 TABLET ORAL at 02:11

## 2017-11-14 RX ADMIN — VANCOMYCIN HYDROCHLORIDE 750 MG: 750 INJECTION, POWDER, LYOPHILIZED, FOR SOLUTION INTRAVENOUS at 03:11

## 2017-11-14 RX ADMIN — HYDROMORPHONE HYDROCHLORIDE 0.5 MG: 1 INJECTION, SOLUTION INTRAMUSCULAR; INTRAVENOUS; SUBCUTANEOUS at 12:11

## 2017-11-14 RX ADMIN — ACYCLOVIR 400 MG: 200 CAPSULE ORAL at 09:11

## 2017-11-14 RX ADMIN — COLCHICINE 0.6 MG: 0.6 TABLET, FILM COATED ORAL at 02:11

## 2017-11-14 RX ADMIN — HYDROMORPHONE HYDROCHLORIDE 0.5 MG: 1 INJECTION, SOLUTION INTRAMUSCULAR; INTRAVENOUS; SUBCUTANEOUS at 10:11

## 2017-11-14 RX ADMIN — HYDROCODONE BITARTRATE AND ACETAMINOPHEN 1 TABLET: 5; 325 TABLET ORAL at 08:11

## 2017-11-14 RX ADMIN — VALSARTAN 80 MG: 40 TABLET ORAL at 08:11

## 2017-11-14 RX ADMIN — HYDRALAZINE HYDROCHLORIDE 10 MG: 10 TABLET, FILM COATED ORAL at 04:11

## 2017-11-14 RX ADMIN — HYDROCODONE BITARTRATE AND ACETAMINOPHEN 1 TABLET: 5; 325 TABLET ORAL at 03:11

## 2017-11-14 RX ADMIN — HYDROCODONE BITARTRATE AND ACETAMINOPHEN 1 TABLET: 5; 325 TABLET ORAL at 09:11

## 2017-11-14 RX ADMIN — FLUCONAZOLE 200 MG: 200 TABLET ORAL at 08:11

## 2017-11-14 RX ADMIN — HYDROMORPHONE HYDROCHLORIDE 0.5 MG: 1 INJECTION, SOLUTION INTRAMUSCULAR; INTRAVENOUS; SUBCUTANEOUS at 03:11

## 2017-11-14 RX ADMIN — HYDROMORPHONE HYDROCHLORIDE 0.5 MG: 1 INJECTION, SOLUTION INTRAMUSCULAR; INTRAVENOUS; SUBCUTANEOUS at 01:11

## 2017-11-14 RX ADMIN — ACYCLOVIR 400 MG: 200 CAPSULE ORAL at 08:11

## 2017-11-14 NOTE — PLAN OF CARE
MDR's with Dr Rajput.  Patient was a direct admit for cellulitis to her right foot which had not improved with PO abx.  IV Vanc started.  Blood cultures drawn and have NGTD.  Patient is afebrile but does c/o of severe pain.  D/c pending improvement in symptoms.  PT/OT eval placed.  Will continue to follow.

## 2017-11-14 NOTE — ASSESSMENT & PLAN NOTE
· Recent diagnosis w/ bone cam in 8/3. 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  · Not a stem cell transplant candidate due to age.  · Started on Vidaza on 8/22. Finished cycle 3 last week. Plan for post cycle 4 bone marrow biopsy to assess response.

## 2017-11-14 NOTE — PROGRESS NOTES
[facility]  Hematology  Bone Marrow Transplant  Progress Note    Patient Name: Shara Rose  Admission Date: 11/13/2017  Hospital Length of Stay: 1 days  Code Status: Full Code    Subjective:     Interval History:   Blood cultures show no growth to date. Pt afebrile. Hypertensive with SBP in 190s- increased valasartan dose. PRN hydralazine ordered. Patient complains of R foot pain and patient stating she cannot walk today due to pain. PT/OT orders to start tomorrow because patient refusing today. Pancytopenic not requiring any infusions today.     Objective:     Vital Signs (Most Recent):  Temp: 98.5 °F (36.9 °C) (11/14/17 0744)  Pulse: 63 (11/14/17 0744)  Resp: 16 (11/14/17 0744)  BP: (!) 158/72 (11/14/17 0744)  SpO2: 96 % (11/14/17 0744) Vital Signs (24h Range):  Temp:  [97.5 °F (36.4 °C)-99.8 °F (37.7 °C)] 98.5 °F (36.9 °C)  Pulse:  [63-84] 63  Resp:  [16-19] 16  SpO2:  [96 %-100 %] 96 %  BP: (158-197)/(71-86) 158/72     Weight: 54.7 kg (120 lb 9.5 oz)  Body mass index is 19.76 kg/m².  Body surface area is 1.59 meters squared.    ECOG SCORE         [unfilled]    Intake/Output - Last 3 Shifts       11/12 0700 - 11/13 0659 11/13 0700 - 11/14 0659 11/14 0700 - 11/15 0659    P.O.  320     Blood  512     Total Intake(mL/kg)  832 (15.2)     Urine (mL/kg/hr)  1150     Stool  0     Total Output   1150      Net   -318             Stool Occurrence  0 x           Physical Exam   Constitutional: She is oriented to person, place, and time. She appears well-developed and well-nourished.   HENT:   Head: Normocephalic and atraumatic.   Right Ear: External ear normal.   Left Ear: External ear normal.   Eyes: Conjunctivae and EOM are normal. Pupils are equal, round, and reactive to light. Right eye exhibits no discharge. Left eye exhibits no discharge.   Neck: Normal range of motion. Neck supple.   Cardiovascular: Normal rate, regular rhythm, normal heart sounds and intact distal pulses.    No murmur  heard.  Pulmonary/Chest: Effort normal and breath sounds normal. No respiratory distress. She has no wheezes.   Abdominal: Soft. Bowel sounds are normal. She exhibits no distension and no mass. There is no tenderness.   Musculoskeletal: Normal range of motion.   Neurological: She is alert and oriented to person, place, and time.   Skin: Skin is warm and dry. There is erythema.   R foot edematous with erythema, warmth, and painful to touch   Psychiatric: She has a normal mood and affect. Her behavior is normal. Judgment and thought content normal.   Nursing note and vitals reviewed.      Significant Labs:   CBC:   Recent Labs  Lab 11/13/17  1153 11/14/17  0451   WBC 1.11* 1.30*   HGB 6.8* 7.5*   HCT 19.9* 22.2*   PLT 2* 61*    and CMP:   Recent Labs  Lab 11/13/17  1118 11/14/17  0451    143   K 3.9 3.8    110   CO2 26 27    90   BUN 11 15   CREATININE 1.1 1.0   CALCIUM 9.7 9.2   PROT 8.5* 7.6   ALBUMIN 2.9* 2.6*   BILITOT 0.4 0.6   ALKPHOS 94 88   AST 17 14   ALT 7* <5*   ANIONGAP 5* 6*   EGFRNONAA 47.2* 53.0*       Diagnostic Results:  I have reviewed all pertinent imaging results/findings within the past 24 hours.    Assessment/Plan:     * Cellulitis    · 81 yo f w/ MDS who presented worsening cellulitis of the right foot, despite oral antibiotics (Clindamycin 300mg TID)  · Blood cultures x 2 obtained 11/13/17- showing NGTD  · Started on IV vancomycin w/ trough before 4th dose  · Afebrile  · Monitor borders daily  · Elevation of extremity helps with pain; continue PRN pain medication  · Foot pain radiating to toe- will check uric acid with history of gout  · SCDs for DVT prophylaxis  · PT/OT ordered to start tomorrow - pt refusing today due to pain         Acute gout involving toe of right foot    - Uric acid elevated  - Symptoms mimic gout pain- pt reports pain is located in great toe joint and toe is sensitive to touch  - Start allopurinol 300mg QD & colchicine 0.6mg x1 (adjusted for renal  function)  - Continue with pain medicine PRN        MDS (myelodysplastic syndrome)    · Recent diagnosis w/ bone cam in 8/3. 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  · Not a stem cell transplant candidate due to age.  · Started on Vidaza on 8/22. Finished cycle 3 last week. Plan for post cycle 4 bone marrow biopsy to assess response.        Stage 2 chronic kidney disease    · Etiology is likely HTN.    · Control BP.  · Creatinine 1.0 & GFR >60 today        Hypothyroidism    · Levothyroxine 25 mcg daily. Last TSH wnl.         Hypertension    - SBP in 190s on valsartan 80 mg and verapamil 240 mg nightly.   - Increased valsartan dose and hydralazine PRN  - Continue to monitor        Pancytopenia    -   - WBC 1.3  - Hgb 7.5  - Plt 61  - Transfuse for plts <10k and hgb <7            VTE Risk Mitigation         Ordered     Place sequential compression device  Until discontinued      11/14/17 0957     Medium Risk of VTE  Once      11/13/17 1549     Reason for no Mechanical VTE Prophylaxis  Once      11/13/17 1549          Disposition: pending 48 hours of negative blood cultures and symptom improvement     Diana Teague, NP  Bone Marrow Transplant  [facility]

## 2017-11-14 NOTE — SUBJECTIVE & OBJECTIVE
Subjective:     Interval History:   Blood cultures show no growth to date. Pt afebrile. Hypertensive with SBP in 190s- increased valasartan dose. PRN hydralazine ordered. Patient complains of R foot pain and patient stating she cannot walk today due to pain. PT/OT orders to start tomorrow because patient refusing today. Pancytopenic not requiring any infusions today.     Objective:     Vital Signs (Most Recent):  Temp: 98.5 °F (36.9 °C) (11/14/17 0744)  Pulse: 63 (11/14/17 0744)  Resp: 16 (11/14/17 0744)  BP: (!) 158/72 (11/14/17 0744)  SpO2: 96 % (11/14/17 0744) Vital Signs (24h Range):  Temp:  [97.5 °F (36.4 °C)-99.8 °F (37.7 °C)] 98.5 °F (36.9 °C)  Pulse:  [63-84] 63  Resp:  [16-19] 16  SpO2:  [96 %-100 %] 96 %  BP: (158-197)/(71-86) 158/72     Weight: 54.7 kg (120 lb 9.5 oz)  Body mass index is 19.76 kg/m².  Body surface area is 1.59 meters squared.    ECOG SCORE         [unfilled]    Intake/Output - Last 3 Shifts       11/12 0700 - 11/13 0659 11/13 0700 - 11/14 0659 11/14 0700 - 11/15 0659    P.O.  320     Blood  512     Total Intake(mL/kg)  832 (15.2)     Urine (mL/kg/hr)  1150     Stool  0     Total Output   1150      Net   -318             Stool Occurrence  0 x           Physical Exam   Constitutional: She is oriented to person, place, and time. She appears well-developed and well-nourished.   HENT:   Head: Normocephalic and atraumatic.   Right Ear: External ear normal.   Left Ear: External ear normal.   Eyes: Conjunctivae and EOM are normal. Pupils are equal, round, and reactive to light. Right eye exhibits no discharge. Left eye exhibits no discharge.   Neck: Normal range of motion. Neck supple.   Cardiovascular: Normal rate, regular rhythm, normal heart sounds and intact distal pulses.    No murmur heard.  Pulmonary/Chest: Effort normal and breath sounds normal. No respiratory distress. She has no wheezes.   Abdominal: Soft. Bowel sounds are normal. She exhibits no distension and no mass. There is no  tenderness.   Musculoskeletal: Normal range of motion.   Neurological: She is alert and oriented to person, place, and time.   Skin: Skin is warm and dry. There is erythema.   R foot edematous with erythema, warmth, and painful to touch   Psychiatric: She has a normal mood and affect. Her behavior is normal. Judgment and thought content normal.   Nursing note and vitals reviewed.      Significant Labs:   CBC:   Recent Labs  Lab 11/13/17  1153 11/14/17  0451   WBC 1.11* 1.30*   HGB 6.8* 7.5*   HCT 19.9* 22.2*   PLT 2* 61*    and CMP:   Recent Labs  Lab 11/13/17  1118 11/14/17  0451    143   K 3.9 3.8    110   CO2 26 27    90   BUN 11 15   CREATININE 1.1 1.0   CALCIUM 9.7 9.2   PROT 8.5* 7.6   ALBUMIN 2.9* 2.6*   BILITOT 0.4 0.6   ALKPHOS 94 88   AST 17 14   ALT 7* <5*   ANIONGAP 5* 6*   EGFRNONAA 47.2* 53.0*       Diagnostic Results:  I have reviewed all pertinent imaging results/findings within the past 24 hours.

## 2017-11-14 NOTE — ASSESSMENT & PLAN NOTE
- Uric acid elevated  - Symptoms mimic gout pain- pt reports pain is located in great toe joint and toe is sensitive to touch  - Start allopurinol 300mg QD & colchicine 0.6mg x1 (adjusted for renal function)  - Continue with pain medicine PRN

## 2017-11-14 NOTE — ASSESSMENT & PLAN NOTE
- SBP in 190s on valsartan 80 mg and verapamil 240 mg nightly.   - Increased valsartan dose and hydralazine PRN  - Continue to monitor

## 2017-11-14 NOTE — ASSESSMENT & PLAN NOTE
· 81 yo f w/ MDS who presented worsening cellulitis of the right foot, despite oral antibiotics (Clindamycin 300mg TID)  · Blood cultures x 2 obtained 11/13/17- showing NGTD  · Started on IV vancomycin w/ trough before 4th dose  · Afebrile  · Monitor borders daily  · Elevation of extremity helps with pain; continue PRN pain medication  · Foot pain radiating to toe- will check uric acid with history of gout  · SCDs for DVT prophylaxis  · PT/OT ordered to start tomorrow - pt refusing today due to pain

## 2017-11-14 NOTE — PLAN OF CARE
Problem: Patient Care Overview  Goal: Plan of Care Review  Outcome: Ongoing (interventions implemented as appropriate)  Pt is resting in bed & is up to the bedside commode with 1 person assist. Complains of increased pain to right foot whenever right foot is in a dependent position. Pt states her right foot feels much better when elevated. VS stable. Pt's personal items and call bell placed within easy reach. Bed is locked, in lowest position, with side rails up x 2. Non-skid socks in place. Pt encouraged pt to call for assistance as needed. Will continue to monitor.

## 2017-11-14 NOTE — ASSESSMENT & PLAN NOTE
- Uric acid elevated to 6.4 (11/15); down to 5.5 today after 1 dose of colchicine (0.6mg x1 adjusted for renal function)  - Symptoms mimic gout pain- pt reports pain is only located in R MTP joint and great toe; highly sensitive to touch   - ibuprofen scheduled  - protonix started today  - steroid taper to start today  - Continue with pain medicine PRN PO; IV dilaudid discontinued   - likely discharge tomorrow; allopurinol to start at discharge

## 2017-11-14 NOTE — ASSESSMENT & PLAN NOTE
· 83 yo f w/ MDS who presented worsening cellulitis of the right foot, despite oral antibiotics (Clindamycin 300mg TID)  · Blood cultures x 2 obtained 11/13/17- showing NGTD  · Afebrile for entire hospital stay  · Elevation of extremity helps with pain; continue PRN pain medication  · Foot pain radiating to toe- checked uric acid with history of gout- elevated to 6.4  · Etiology of R foot inflammation, pain, swelling: gout vs cellulitis  · PT/OT ordered  · R foot xray ordered 11/14/17- mild arthritic changes at 1st MTP joint; possible gout with small calcifications surrounding MTP joint and mild soft tissue swelling in this area  · Vanc discontinued today; cipro PPX started & doxycycline x7 day course for cellulitis treatment

## 2017-11-15 LAB
ALBUMIN SERPL BCP-MCNC: 2.6 G/DL
ALP SERPL-CCNC: 84 U/L
ALT SERPL W/O P-5'-P-CCNC: 6 U/L
ANION GAP SERPL CALC-SCNC: 7 MMOL/L
AST SERPL-CCNC: 14 U/L
BASOPHILS # BLD AUTO: 0 K/UL
BASOPHILS NFR BLD: 0 %
BILIRUB SERPL-MCNC: 0.5 MG/DL
BUN SERPL-MCNC: 12 MG/DL
CALCIUM SERPL-MCNC: 9 MG/DL
CHLORIDE SERPL-SCNC: 106 MMOL/L
CO2 SERPL-SCNC: 25 MMOL/L
CREAT SERPL-MCNC: 0.9 MG/DL
DIFFERENTIAL METHOD: ABNORMAL
EOSINOPHIL # BLD AUTO: 0 K/UL
EOSINOPHIL NFR BLD: 2.3 %
ERYTHROCYTE [DISTWIDTH] IN BLOOD BY AUTOMATED COUNT: 14 %
EST. GFR  (AFRICAN AMERICAN): >60 ML/MIN/1.73 M^2
EST. GFR  (NON AFRICAN AMERICAN): >60 ML/MIN/1.73 M^2
GLUCOSE SERPL-MCNC: 88 MG/DL
HCT VFR BLD AUTO: 22.9 %
HGB BLD-MCNC: 7.7 G/DL
IMM GRANULOCYTES # BLD AUTO: 0.01 K/UL
IMM GRANULOCYTES NFR BLD AUTO: 0.8 %
LYMPHOCYTES # BLD AUTO: 1 K/UL
LYMPHOCYTES NFR BLD: 74.2 %
MAGNESIUM SERPL-MCNC: 1.9 MG/DL
MCH RBC QN AUTO: 28.9 PG
MCHC RBC AUTO-ENTMCNC: 33.6 G/DL
MCV RBC AUTO: 86 FL
MONOCYTES # BLD AUTO: 0 K/UL
MONOCYTES NFR BLD: 1.6 %
NEUTROPHILS # BLD AUTO: 0.3 K/UL
NEUTROPHILS NFR BLD: 21.1 %
NRBC BLD-RTO: 0 /100 WBC
PHOSPHATE SERPL-MCNC: 3.4 MG/DL
PLATELET # BLD AUTO: 49 K/UL
PMV BLD AUTO: 12.1 FL
POTASSIUM SERPL-SCNC: 3.5 MMOL/L
PROT SERPL-MCNC: 7.8 G/DL
RBC # BLD AUTO: 2.66 M/UL
SODIUM SERPL-SCNC: 138 MMOL/L
URATE SERPL-MCNC: 5.5 MG/DL
WBC # BLD AUTO: 1.28 K/UL

## 2017-11-15 PROCEDURE — 25000003 PHARM REV CODE 250: Performed by: NURSE PRACTITIONER

## 2017-11-15 PROCEDURE — 63600175 PHARM REV CODE 636 W HCPCS: Performed by: NURSE PRACTITIONER

## 2017-11-15 PROCEDURE — 97165 OT EVAL LOW COMPLEX 30 MIN: CPT

## 2017-11-15 PROCEDURE — 83735 ASSAY OF MAGNESIUM: CPT

## 2017-11-15 PROCEDURE — 25000003 PHARM REV CODE 250: Performed by: INTERNAL MEDICINE

## 2017-11-15 PROCEDURE — 36415 COLL VENOUS BLD VENIPUNCTURE: CPT

## 2017-11-15 PROCEDURE — 80053 COMPREHEN METABOLIC PANEL: CPT

## 2017-11-15 PROCEDURE — 85025 COMPLETE CBC W/AUTO DIFF WBC: CPT

## 2017-11-15 PROCEDURE — 84100 ASSAY OF PHOSPHORUS: CPT

## 2017-11-15 PROCEDURE — 99233 SBSQ HOSP IP/OBS HIGH 50: CPT | Mod: ,,, | Performed by: INTERNAL MEDICINE

## 2017-11-15 PROCEDURE — 20600001 HC STEP DOWN PRIVATE ROOM

## 2017-11-15 PROCEDURE — 63600175 PHARM REV CODE 636 W HCPCS: Performed by: INTERNAL MEDICINE

## 2017-11-15 PROCEDURE — 84550 ASSAY OF BLOOD/URIC ACID: CPT

## 2017-11-15 RX ORDER — PANTOPRAZOLE SODIUM 40 MG/1
40 TABLET, DELAYED RELEASE ORAL DAILY
Status: DISCONTINUED | OUTPATIENT
Start: 2017-11-15 | End: 2017-11-16 | Stop reason: HOSPADM

## 2017-11-15 RX ORDER — PREDNISONE 20 MG/1
20 TABLET ORAL DAILY
Status: DISCONTINUED | OUTPATIENT
Start: 2017-11-17 | End: 2017-11-16 | Stop reason: HOSPADM

## 2017-11-15 RX ORDER — PREDNISONE 2.5 MG/1
2.5 TABLET ORAL DAILY
Status: DISCONTINUED | OUTPATIENT
Start: 2017-11-21 | End: 2017-11-16 | Stop reason: HOSPADM

## 2017-11-15 RX ORDER — PREDNISONE 10 MG/1
10 TABLET ORAL DAILY
Status: DISCONTINUED | OUTPATIENT
Start: 2017-11-19 | End: 2017-11-16 | Stop reason: HOSPADM

## 2017-11-15 RX ORDER — IBUPROFEN 400 MG/1
400 TABLET ORAL EVERY 6 HOURS
Status: DISCONTINUED | OUTPATIENT
Start: 2017-11-15 | End: 2017-11-16

## 2017-11-15 RX ORDER — DOXYCYCLINE HYCLATE 100 MG
100 TABLET ORAL EVERY 12 HOURS
Status: DISCONTINUED | OUTPATIENT
Start: 2017-11-15 | End: 2017-11-16 | Stop reason: HOSPADM

## 2017-11-15 RX ORDER — CIPROFLOXACIN 250 MG/1
500 TABLET, FILM COATED ORAL EVERY 12 HOURS
Status: DISCONTINUED | OUTPATIENT
Start: 2017-11-15 | End: 2017-11-16 | Stop reason: HOSPADM

## 2017-11-15 RX ORDER — ALLOPURINOL 100 MG/1
100 TABLET ORAL DAILY
Status: DISCONTINUED | OUTPATIENT
Start: 2017-11-15 | End: 2017-11-16 | Stop reason: HOSPADM

## 2017-11-15 RX ORDER — PREDNISONE 5 MG/1
5 TABLET ORAL DAILY
Status: DISCONTINUED | OUTPATIENT
Start: 2017-11-20 | End: 2017-11-16 | Stop reason: HOSPADM

## 2017-11-15 RX ADMIN — HYDROCODONE BITARTRATE AND ACETAMINOPHEN 1 TABLET: 5; 325 TABLET ORAL at 09:11

## 2017-11-15 RX ADMIN — ACYCLOVIR 400 MG: 200 CAPSULE ORAL at 09:11

## 2017-11-15 RX ADMIN — IBUPROFEN 400 MG: 400 TABLET, FILM COATED ORAL at 01:11

## 2017-11-15 RX ADMIN — PREDNISONE 30 MG: 20 TABLET ORAL at 11:11

## 2017-11-15 RX ADMIN — ACYCLOVIR 400 MG: 200 CAPSULE ORAL at 08:11

## 2017-11-15 RX ADMIN — DOXYCYCLINE HYCLATE 100 MG: 100 TABLET, COATED ORAL at 11:11

## 2017-11-15 RX ADMIN — LEVOTHYROXINE SODIUM 25 MCG: 25 TABLET ORAL at 06:11

## 2017-11-15 RX ADMIN — FLUCONAZOLE 200 MG: 200 TABLET ORAL at 08:11

## 2017-11-15 RX ADMIN — CIPROFLOXACIN HYDROCHLORIDE 500 MG: 250 TABLET, FILM COATED ORAL at 09:11

## 2017-11-15 RX ADMIN — DOXYCYCLINE HYCLATE 100 MG: 100 TABLET, COATED ORAL at 09:11

## 2017-11-15 RX ADMIN — ALLOPURINOL 100 MG: 100 TABLET ORAL at 01:11

## 2017-11-15 RX ADMIN — PANTOPRAZOLE SODIUM 40 MG: 40 TABLET, DELAYED RELEASE ORAL at 09:11

## 2017-11-15 RX ADMIN — IBUPROFEN 400 MG: 400 TABLET, FILM COATED ORAL at 08:11

## 2017-11-15 RX ADMIN — VERAPAMIL HYDROCHLORIDE 240 MG: 240 TABLET, FILM COATED, EXTENDED RELEASE ORAL at 09:11

## 2017-11-15 RX ADMIN — HYDROMORPHONE HYDROCHLORIDE 0.5 MG: 1 INJECTION, SOLUTION INTRAMUSCULAR; INTRAVENOUS; SUBCUTANEOUS at 06:11

## 2017-11-15 RX ADMIN — IBUPROFEN 400 MG: 400 TABLET, FILM COATED ORAL at 06:11

## 2017-11-15 RX ADMIN — VALSARTAN 160 MG: 160 TABLET ORAL at 08:11

## 2017-11-15 NOTE — PLAN OF CARE
Problem: Occupational Therapy Goal  Goal: Occupational Therapy Goal  Goals to be met by:  2 Weeks (2017)    Patient will increase functional independence with ADLs by performin. Supine to sit with [place].  2. Sit to Stand transfers with Supervision.   3. Toilet transfer to toilet with Supervision.  4. Grooming while standing at sink with Supervision.  5. UE Dressing with [place].  6. LE Dressing with [place].        OT eval completed and goals set.  DAMIEN Sarmiento  11/15/2017

## 2017-11-15 NOTE — PROGRESS NOTES
[facility]  Hematology  Bone Marrow Transplant  Progress Note    Patient Name: Shara Rose  Admission Date: 11/13/2017  Hospital Length of Stay: 2 days  Code Status: Full Code    Subjective:     Interval History:   Afebrile. Blood cultures NGTD.  Pancytopenic not requiring transfusions. R foot xray 11/14/17 shows mild arthritic changes at 1st MTP joint and possible gout with small calcifications surrounding MTP joint and mild soft tissue swelling around this joint. Pt states her pain is only located in the R MTP joint and great toe. Pt states pain is improving and similar to previous gout flare pain. Colchicine x1 given yesterday. Uric acid down to 5.5 today. Will start prednisone taper over 7 days. NSAID scheduled. Vancomycin discontinued. Cipro PPx and doxycycline x7 day course for cellulitis. Possible DC tomorrow. Will need allopurinol at discharge.     Objective:     Vital Signs (Most Recent):  Temp: 97.9 °F (36.6 °C) (11/15/17 0733)  Pulse: 70 (11/15/17 0733)  Resp: 20 (11/15/17 0733)  BP: (!) 197/82 (11/15/17 0733)  SpO2: 99 % (11/15/17 0733) Vital Signs (24h Range):  Temp:  [97.2 °F (36.2 °C)-99.3 °F (37.4 °C)] 97.9 °F (36.6 °C)  Pulse:  [63-70] 70  Resp:  [17-20] 20  SpO2:  [94 %-100 %] 99 %  BP: (165-201)/(70-88) 197/82     Weight: 54.7 kg (120 lb 9.5 oz)  Body mass index is 19.76 kg/m².  Body surface area is 1.59 meters squared.    ECOG SCORE         [unfilled]    Intake/Output - Last 3 Shifts       11/13 0700 - 11/14 0659 11/14 0700 - 11/15 0659 11/15 0700 - 11/16 0659    P.O. 320      Blood 512      Total Intake(mL/kg) 832 (15.2)      Urine (mL/kg/hr) 1150 500 (0.4)     Stool 0 0 (0)     Total Output 1150 500      Net -318 -500             Stool Occurrence 0 x 0 x           Physical Exam   Constitutional: She is oriented to person, place, and time. She appears well-developed and well-nourished.   HENT:   Head: Normocephalic and atraumatic.   Right Ear: External ear normal.   Left  Ear: External ear normal.   Eyes: Conjunctivae and EOM are normal. Pupils are equal, round, and reactive to light. Right eye exhibits no discharge. Left eye exhibits no discharge.   Neck: Normal range of motion. Neck supple.   Cardiovascular: Normal rate, regular rhythm, normal heart sounds and intact distal pulses.    No murmur heard.  Pulmonary/Chest: Effort normal and breath sounds normal. No respiratory distress. She has no wheezes.   Abdominal: Soft. Bowel sounds are normal. She exhibits no distension and no mass. There is no tenderness.   Musculoskeletal: Normal range of motion.   Neurological: She is alert and oriented to person, place, and time.   Skin: Skin is warm and dry. No rash noted. There is erythema.   R foot edema, erythema, painful to touch to R great toe and MTP joint, warm to touch    Psychiatric: She has a normal mood and affect. Her behavior is normal. Judgment and thought content normal.   Nursing note and vitals reviewed.      Significant Labs:   CBC:   Recent Labs  Lab 11/13/17  1153 11/14/17  0451 11/15/17  0525   WBC 1.11* 1.30* 1.28*   HGB 6.8* 7.5* 7.7*   HCT 19.9* 22.2* 22.9*   PLT 2* 61* 49*    and CMP:   Recent Labs  Lab 11/13/17  1118 11/14/17  0451 11/15/17  0525    143 138   K 3.9 3.8 3.5    110 106   CO2 26 27 25    90 88   BUN 11 15 12   CREATININE 1.1 1.0 0.9   CALCIUM 9.7 9.2 9.0   PROT 8.5* 7.6 7.8   ALBUMIN 2.9* 2.6* 2.6*   BILITOT 0.4 0.6 0.5   ALKPHOS 94 88 84   AST 17 14 14   ALT 7* <5* 6*   ANIONGAP 5* 6* 7*   EGFRNONAA 47.2* 53.0* >60.0       Diagnostic Results:  I have reviewed all pertinent imaging results/findings within the past 24 hours.    R foot xray (11/14/17): Mild arthritic changes at first MTP joint, possible gout. Small plantar calcaneal spur is present. First MTP joint space is mildly narrowed. Small calcifications are adjacent to it suggesting previous inflammation, including possible gout. There is questionable erosion on the first  metatarsal head. Other joint spaces are satisfactory. Mild soft tissue swelling is present near the first MTP joint also.     Assessment/Plan:     * Cellulitis    · 83 yo f w/ MDS who presented worsening cellulitis of the right foot, despite oral antibiotics (Clindamycin 300mg TID)  · Blood cultures x 2 obtained 11/13/17- showing NGTD  · Afebrile for entire hospital stay  · Elevation of extremity helps with pain; continue PRN pain medication  · Foot pain radiating to toe- checked uric acid with history of gout- elevated to 6.4  · Etiology of R foot inflammation, pain, swelling: gout vs cellulitis  · PT/OT ordered  · R foot xray ordered 11/14/17- mild arthritic changes at 1st MTP joint; possible gout with small calcifications surrounding MTP joint and mild soft tissue swelling in this area  · Vanc discontinued today; cipro PPX started & doxycycline x7 day course for cellulitis treatment         Acute gout involving toe of right foot    - Uric acid elevated to 6.4 (11/15); down to 5.5 today after 1 dose of colchicine (0.6mg x1 adjusted for renal function)  - Symptoms mimic gout pain- pt reports pain is only located in R MTP joint and great toe; highly sensitive to touch   - ibuprofen scheduled  - protonix started today  - steroid taper to start today  - Continue with pain medicine PRN PO; IV dilaudid discontinued   - likely discharge tomorrow; allopurinol to start at discharge        MDS (myelodysplastic syndrome)    · Recent diagnosis w/ bone cam in 8/3. 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  · Not a stem cell transplant candidate due to age.  · Started on Vidaza on 8/22. Finished cycle 3 last week. Plan for post cycle 4 bone marrow biopsy to assess response.  · Appointment scheduled with Dr. Wang for 11/27/17 for Vidaza & labs        Stage 2 chronic kidney disease    · Etiology is likely HTN.    · Control BP.  · Creatinine 0.9 & GFR >60 today        Hypothyroidism     · Levothyroxine 25 mcg daily. Last TSH wnl.         Hypertension    - SBP in 190s on valsartan 80 mg and verapamil 240 mg nightly.   - Increased valsartan dose today and hydralazine PRN  - Continue to monitor        Pancytopenia    -   - WBC 1.28  - Hgb 7.7  - Plt 49  - Transfuse for plts <10k and hgb <7            VTE Risk Mitigation         Ordered     Place sequential compression device  Until discontinued      11/14/17 0957     Medium Risk of VTE  Once      11/13/17 1549     Reason for no Mechanical VTE Prophylaxis  Once      11/13/17 1549          Disposition: pending improvement in symptoms and afebrile x48 hours; will likely discharge tomorrow    Diana Teague NP  Bone Marrow Transplant  [facility]

## 2017-11-15 NOTE — ASSESSMENT & PLAN NOTE
· Recent diagnosis w/ bone cam in 8/3. 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  · Not a stem cell transplant candidate due to age.  · Started on Vidaza on 8/22. Finished cycle 3 last week. Plan for post cycle 4 bone marrow biopsy to assess response.  · Appointment scheduled with Dr. Wang for 11/27/17 for Vidaza & labs

## 2017-11-15 NOTE — ASSESSMENT & PLAN NOTE
- SBP in 190s on valsartan 80 mg and verapamil 240 mg nightly.   - Increased valsartan dose today and hydralazine PRN  - Continue to monitor

## 2017-11-15 NOTE — PT/OT/SLP EVAL
"Occupational Therapy  Evaluation    Shara Rose   MRN: [id number]   Admitting Diagnosis: Cellulitis    OT Date of Treatment: 11/15/17   OT Start Time: 0831  OT Stop Time: 0842  OT Total Time (min): 11 min    Billable Minutes:  Evaluation 11    Diagnosis: Cellulitis       Past Medical History:   Diagnosis Date    Allergy     Anemia     Arthritis     Cataract     CKD (chronic kidney disease)     Gout, unspecified     Hypertension     Hypothyroidism     MDS (myelodysplastic syndrome) 8/14/2017    Menopause     PNA (pneumonia) 10/15/2017    Sickle cell trait     Trigger finger       Past Surgical History:   Procedure Laterality Date    APPENDECTOMY      HEMORRHOID SURGERY      HYSTERECTOMY         Referring physician: YOSEF Teague  Date referred to OT: 11/15/2017    General Precautions: Standard, fall  Orthopedic Precautions:    Braces:      Do you have any cultural, spiritual, Amish conflicts, given your current situation?: no     Patient History:  Living Environment  Living Environment Comment: Pt lives with spouse in 1SH with ramp access. Pt was (I) with ADLs and functional mobility PTA. Pt does not work, drives and will have some A upon DC.  Equipment Currently Used at Home: none    Dominant hand: right    Subjective:  Communicated with RN prior to session.  "I am not standing up." "I cant even have the blanket touch this! (R foot)" "I am cold!"  Chief Complaint: pain in R foot  Patient/Family stated goals: reduce pain in foot, go home    Pain/Comfort  Pain Rating 1: 5/10  Location - Side 1: Right  Location - Orientation 1: generalized  Location 1: foot  Pain Addressed 1: Reposition, Distraction, Nurse notified  Pain Rating Post-Intervention 1: 4/10    Objective:  Patient found with: peripheral IV    Cognitive Exam:  Oriented to: Person, Place, Time and Situation  Follows Commands/attention: Follows multistep  commands  Communication: clear/fluent  Memory:  No Deficits noted  Safety " awareness/insight to disability: intact  Coping skills/emotional control: Appropriate to situation    Visual/perceptual:  Intact    Physical Exam:  Postural examination/scapula alignment: Rounded shoulder and Head forward  Skin integrity: Visible skin intact  Edema: None noted BUE, moderated edema noted to R foot    Sensation:   Intact    Upper Extremity Range of Motion:  Right Upper Extremity: WFL  Left Upper Extremity: WFL    Upper Extremity Strength:  Right Upper Extremity: WFL  Left Upper Extremity: WFL   Strength: WFL    Fine motor coordination:   Intact    Gross motor coordination: WFL    Functional Mobility:  Bed Mobility:   Rolling: Mod (I) with Hand rail   Supine<>Sit: Mod (I) with hand rail and HOB slightly elevated (sit<>long sit)   Scooting/Bridging: Mod (I) in bed alignment and to HOB    Transfers:   Sit>Stand: Na   Stand>Sit: NA   Toilet: NA          Activities of Daily Living:  Feeding: Modified [place] in bed to eat breakfast; pt able to make coffee/ mix coffee with sugar and creamer, and eat breakfast.  UE Dressing: Minimal Assistance don gown like alexandre.  LE Dressing: Supervision don L sock; refused to don R sock due to pain.  Grooming: Stand-by Assistance to wash hands with cloth while in bed with HOB elevated     Balance:  Static Sitting:           good  Dynamic Sitting:      good  Static Standing:       NA  Dynamic Standing:  NA    Therapeutic Activities and Exercises:  Pt educated on safety with daily tasks OOB, and importance of participating in daily ax. Pt whiteboard updated.      AM-PAC 6 CLICK ADL  How much help from another person does this patient currently need?  1 = Unable, Total/Dependent Assistance  2 = A lot, Maximum/Moderate Assistance  3 = A little, Minimum/Contact Guard/Supervision  4 = None, Modified [place]/Independent    Putting on and taking off regular lower body clothing? : 3  Bathing (including washing, rinsing, drying)?: 3  Toileting, which includes using  "toilet, bedpan, or urinal? : 3  Putting on and taking off regular upper body clothing?: 3  Taking care of personal grooming such as brushing teeth?: 3  Eating meals?: 4  Total Score: 19    AM-PAC Raw Score CMS "G-Code Modifier Level of Impairment Assistance   6 % Total / Unable   7 - 9 CM 80 - 100% Maximal Assist   10-14 CL 60 - 80% Moderate Assist   15 - 19 CK 40 - 60% Moderate Assist   20 - 22 CJ 20 - 40% Minimal Assist   23 CI 1-20% SBA / CGA   24 CH 0% Independent/ Mod I       Patient left supine with all lines intact, call button in reach, RN notified and HOB elevated with breakfast tray set up and pt eating    Assessment:  Shara Rose is a 82 y.o. female with a medical diagnosis of Cellulitis. Pt tolerated session well and put forth good effort to participate. Pt refused OOB or EOB ax due to severity of pain in RLE/foot. Pt presented with decreased (I), endurance, stability and safety for ADLs, self-care and functional mobility. Pt will benefit from further OT in order to maximize (I) and safety for functional tasks.      Rehab identified problem list/impairments: Rehab identified problem list/impairments: weakness, impaired functional mobilty, gait instability, impaired endurance, impaired balance, impaired self care skills, pain, decreased lower extremity function    Rehab potential is good.    Activity tolerance: Good    Discharge recommendations: Discharge Facility/Level Of Care Needs: home     Barriers to discharge: Barriers to Discharge: None    Equipment recommendations: shower chair, walker, rolling     GOALS:    Occupational Therapy Goals        Problem: Occupational Therapy Goal    Goal Priority Disciplines Outcome Interventions   Occupational Therapy Goal     OT, PT/OT     Description:  Goals to be met by:  2 Weeks (2017)    Patient will increase functional independence with ADLs by performin. Supine to sit with [place].  2. Sit to Stand transfers with Supervision.   3. " Toilet transfer to toilet with Supervision.  4. Grooming while standing at sink with Supervision.  5. UE Dressing with [place].  6. LE Dressing with [place].                         PLAN:  Patient to be seen 3 x/week to address the above listed problems via self-care/home management, community/work re-entry, therapeutic activities, therapeutic exercises  Plan of Care expires: 12/15/17  Plan of Care reviewed with: patient         DAMIEN Sarmiento  11/15/2017

## 2017-11-15 NOTE — PHYSICIAN QUERY
PT Name: Shara Rose  MR #: [id number]     Physician Query Form - Documentation Clarification      CDS/: Mai West RN       Contact information:[email or web address]    This form is a permanent document in the medical record.     Query Date: November 15, 2017    By submitting this query, we are merely seeking further clarification of documentation. Please utilize your independent clinical judgment when addressing the question(s) below.    The Medical record reflects the following:    Supporting Clinical Findings Location in Medical Record   Acute gout involving toe of right foot  Uric acid elevated    Start allopurinol 300mg QD & colchicine 0.6mg x1 (adjusted for renal function)   - Continue with pain medicine PRN       81 yo f w/ MDS who presented worsening cellulitis of the right foot, despite oral antibiotics      Progress note 11/14                H & P   Uric acid 6.4    Uric Acid 5.5   Labs 11/14    Labs 11/15                                                                            Doctor, Please specify diagnosis or diagnoses associated with above clinical findings.    Provider Use Only    [   ]  Acute gout with Tophus (tophi)  [  x ]  Acute gout without Tophus (tophi)    [   ]  Chronic gout with Tophus (tophi)  [   ]  Chronic gout without Tophus (tophi)                                                                                                                 [  ] Clinically undetermined

## 2017-11-15 NOTE — PLAN OF CARE
Problem: Patient Care Overview  Goal: Plan of Care Review  Outcome: Ongoing (interventions implemented as appropriate)  Pt remains free from injury. Pt calls when needing assistance getting out of bed. Pt remains afebrile and continues on IV antibiotics. Pt had xray of foot. No acute events throughout the day. Will continue to monitor

## 2017-11-15 NOTE — PT/OT/SLP PROGRESS
"Physical Therapy      Shara Rose  MRN: [id number]    Patient not seen today secondary to refusal.  Pt stated that she had "just gotten back into bed" and did not want to get up again.  PT educated patient on the importance of OOB activity, but pt continued to refuse.  Will follow-up tomorrow.    Hong Osorio, PT    "

## 2017-11-16 VITALS
DIASTOLIC BLOOD PRESSURE: 75 MMHG | RESPIRATION RATE: 18 BRPM | WEIGHT: 120.56 LBS | OXYGEN SATURATION: 96 % | BODY MASS INDEX: 19.38 KG/M2 | TEMPERATURE: 98 F | HEIGHT: 66 IN | SYSTOLIC BLOOD PRESSURE: 160 MMHG | HEART RATE: 60 BPM

## 2017-11-16 LAB
ALBUMIN SERPL BCP-MCNC: 2.7 G/DL
ALP SERPL-CCNC: 104 U/L
ALT SERPL W/O P-5'-P-CCNC: 5 U/L
ANION GAP SERPL CALC-SCNC: 7 MMOL/L
ANISOCYTOSIS BLD QL SMEAR: SLIGHT
AST SERPL-CCNC: 13 U/L
BASOPHILS # BLD AUTO: 0 K/UL
BASOPHILS NFR BLD: 0 %
BILIRUB SERPL-MCNC: 0.5 MG/DL
BUN SERPL-MCNC: 19 MG/DL
CALCIUM SERPL-MCNC: 9.8 MG/DL
CHLORIDE SERPL-SCNC: 107 MMOL/L
CO2 SERPL-SCNC: 25 MMOL/L
CREAT SERPL-MCNC: 1.2 MG/DL
DIFFERENTIAL METHOD: ABNORMAL
EOSINOPHIL # BLD AUTO: 0 K/UL
EOSINOPHIL NFR BLD: 0.9 %
ERYTHROCYTE [DISTWIDTH] IN BLOOD BY AUTOMATED COUNT: 13.5 %
EST. GFR  (AFRICAN AMERICAN): 49 ML/MIN/1.73 M^2
EST. GFR  (NON AFRICAN AMERICAN): 42.5 ML/MIN/1.73 M^2
GLUCOSE SERPL-MCNC: 98 MG/DL
HCT VFR BLD AUTO: 23.2 %
HGB BLD-MCNC: 7.9 G/DL
HYPOCHROMIA BLD QL SMEAR: ABNORMAL
IMM GRANULOCYTES # BLD AUTO: 0.01 K/UL
IMM GRANULOCYTES NFR BLD AUTO: 0.9 %
LYMPHOCYTES # BLD AUTO: 0.7 K/UL
LYMPHOCYTES NFR BLD: 64.3 %
MAGNESIUM SERPL-MCNC: 2.4 MG/DL
MCH RBC QN AUTO: 28.8 PG
MCHC RBC AUTO-ENTMCNC: 34.1 G/DL
MCV RBC AUTO: 85 FL
MONOCYTES # BLD AUTO: 0 K/UL
MONOCYTES NFR BLD: 0.9 %
NEUTROPHILS # BLD AUTO: 0.4 K/UL
NEUTROPHILS NFR BLD: 33 %
NRBC BLD-RTO: 0 /100 WBC
OVALOCYTES BLD QL SMEAR: ABNORMAL
PHOSPHATE SERPL-MCNC: 3.8 MG/DL
PLATELET # BLD AUTO: 42 K/UL
PMV BLD AUTO: 11.5 FL
POIKILOCYTOSIS BLD QL SMEAR: SLIGHT
POLYCHROMASIA BLD QL SMEAR: ABNORMAL
POTASSIUM SERPL-SCNC: 3.6 MMOL/L
PROT SERPL-MCNC: 8.1 G/DL
RBC # BLD AUTO: 2.74 M/UL
SODIUM SERPL-SCNC: 139 MMOL/L
WBC # BLD AUTO: 1.12 K/UL

## 2017-11-16 PROCEDURE — 25000003 PHARM REV CODE 250: Performed by: NURSE PRACTITIONER

## 2017-11-16 PROCEDURE — 80053 COMPREHEN METABOLIC PANEL: CPT

## 2017-11-16 PROCEDURE — 99233 SBSQ HOSP IP/OBS HIGH 50: CPT | Mod: ,,, | Performed by: INTERNAL MEDICINE

## 2017-11-16 PROCEDURE — 63600175 PHARM REV CODE 636 W HCPCS: Performed by: NURSE PRACTITIONER

## 2017-11-16 PROCEDURE — 97161 PT EVAL LOW COMPLEX 20 MIN: CPT

## 2017-11-16 PROCEDURE — 83735 ASSAY OF MAGNESIUM: CPT

## 2017-11-16 PROCEDURE — 25000003 PHARM REV CODE 250: Performed by: INTERNAL MEDICINE

## 2017-11-16 PROCEDURE — 85025 COMPLETE CBC W/AUTO DIFF WBC: CPT

## 2017-11-16 PROCEDURE — 36415 COLL VENOUS BLD VENIPUNCTURE: CPT

## 2017-11-16 PROCEDURE — G8979 MOBILITY GOAL STATUS: HCPCS | Mod: CH

## 2017-11-16 PROCEDURE — G8980 MOBILITY D/C STATUS: HCPCS | Mod: CH

## 2017-11-16 PROCEDURE — 84100 ASSAY OF PHOSPHORUS: CPT

## 2017-11-16 PROCEDURE — G8978 MOBILITY CURRENT STATUS: HCPCS | Mod: CH

## 2017-11-16 RX ORDER — DOXYCYCLINE HYCLATE 100 MG
100 TABLET ORAL EVERY 12 HOURS
Qty: 12 TABLET | Refills: 0 | Status: SHIPPED | OUTPATIENT
Start: 2017-11-16 | End: 2017-11-22

## 2017-11-16 RX ORDER — ALLOPURINOL 100 MG/1
100 TABLET ORAL DAILY
Qty: 30 TABLET | Refills: 2 | Status: SHIPPED | OUTPATIENT
Start: 2017-11-17

## 2017-11-16 RX ORDER — VALSARTAN 160 MG/1
160 TABLET ORAL DAILY
Qty: 30 TABLET | Refills: 2 | Status: SHIPPED | OUTPATIENT
Start: 2017-11-16

## 2017-11-16 RX ORDER — PANTOPRAZOLE SODIUM 40 MG/1
40 TABLET, DELAYED RELEASE ORAL DAILY
Qty: 7 TABLET | Refills: 0 | Status: SHIPPED | OUTPATIENT
Start: 2017-11-17 | End: 2018-11-17

## 2017-11-16 RX ORDER — PREDNISONE 5 MG/1
TABLET ORAL
Qty: 11 TABLET | Refills: 0 | Status: ON HOLD | OUTPATIENT
Start: 2017-11-16 | End: 2017-12-12 | Stop reason: HOSPADM

## 2017-11-16 RX ADMIN — FLUCONAZOLE 200 MG: 200 TABLET ORAL at 08:11

## 2017-11-16 RX ADMIN — IBUPROFEN 400 MG: 400 TABLET, FILM COATED ORAL at 12:11

## 2017-11-16 RX ADMIN — PANTOPRAZOLE SODIUM 40 MG: 40 TABLET, DELAYED RELEASE ORAL at 08:11

## 2017-11-16 RX ADMIN — PREDNISONE 25 MG: 5 TABLET ORAL at 08:11

## 2017-11-16 RX ADMIN — IBUPROFEN 400 MG: 400 TABLET, FILM COATED ORAL at 06:11

## 2017-11-16 RX ADMIN — DOXYCYCLINE HYCLATE 100 MG: 100 TABLET, COATED ORAL at 08:11

## 2017-11-16 RX ADMIN — LEVOTHYROXINE SODIUM 25 MCG: 25 TABLET ORAL at 06:11

## 2017-11-16 RX ADMIN — ALLOPURINOL 100 MG: 100 TABLET ORAL at 08:11

## 2017-11-16 RX ADMIN — VALSARTAN 160 MG: 160 TABLET ORAL at 08:11

## 2017-11-16 RX ADMIN — ACYCLOVIR 400 MG: 200 CAPSULE ORAL at 08:11

## 2017-11-16 RX ADMIN — CIPROFLOXACIN HYDROCHLORIDE 500 MG: 250 TABLET, FILM COATED ORAL at 08:11

## 2017-11-16 NOTE — PROGRESS NOTES
[facility]  Hematology  Bone Marrow Transplant  Progress Note    Patient Name: Shara Rose  Admission Date: 11/13/2017  Hospital Length of Stay: 3 days  Code Status: Full Code    Subjective:     Interval History:   Improving pain. Remained afebrile. . Plan for discharge today.     Objective:     Vital Signs (Most Recent):  Temp: 97.8 °F (36.6 °C) (11/16/17 0744)  Pulse: 60 (11/16/17 0744)  Resp: 18 (11/16/17 0744)  BP: (!) 160/75 (11/16/17 0744)  SpO2: 96 % (11/16/17 0744) Vital Signs (24h Range):  Temp:  [97.6 °F (36.4 °C)-98.2 °F (36.8 °C)] 97.8 °F (36.6 °C)  Pulse:  [59-70] 60  Resp:  [17-20] 18  SpO2:  [92 %-100 %] 96 %  BP: (150-191)/(60-82) 160/75     Weight: 54.7 kg (120 lb 9.5 oz)  Body mass index is 19.76 kg/m².  Body surface area is 1.59 meters squared.    ECOG SCORE         [unfilled]    Intake/Output - Last 3 Shifts       11/14 0700 - 11/15 0659 11/15 0700 - 11/16 0659 11/16 0700 - 11/17 0659    P.O.   120    Blood       Total Intake(mL/kg)   120 (2.2)    Urine (mL/kg/hr) 500 (0.4)      Stool 0 (0)      Total Output 500        Net -500   +120           Urine Occurrence   1 x    Stool Occurrence 0 x 0 x           Physical Exam   Constitutional: She is oriented to person, place, and time. She appears well-developed and well-nourished.   HENT:   Head: Normocephalic and atraumatic.   Right Ear: External ear normal.   Left Ear: External ear normal.   Eyes: Conjunctivae and EOM are normal. Pupils are equal, round, and reactive to light. Right eye exhibits no discharge. Left eye exhibits no discharge.   Neck: Normal range of motion. Neck supple.   Cardiovascular: Normal rate, regular rhythm, normal heart sounds and intact distal pulses.    No murmur heard.  Pulmonary/Chest: Effort normal and breath sounds normal. No respiratory distress. She has no wheezes.   Abdominal: Soft. Bowel sounds are normal. She exhibits no distension and no mass. There is no tenderness.   Musculoskeletal:  Normal range of motion.   Neurological: She is alert and oriented to person, place, and time.   Skin: Skin is warm and dry. No rash noted. There is erythema.   R foot edema, erythema, painful to touch to R great toe and MTP joint, warm to touch    Psychiatric: She has a normal mood and affect. Her behavior is normal. Judgment and thought content normal.   Nursing note and vitals reviewed.    Significant Labs:   CBC:     Recent Labs  Lab 11/15/17  0525 11/16/17  0508   WBC 1.28* 1.12*   HGB 7.7* 7.9*   HCT 22.9* 23.2*   PLT 49* 42*    and CMP:     Recent Labs  Lab 11/15/17  0525 11/16/17  0508    139   K 3.5 3.6    107   CO2 25 25   GLU 88 98   BUN 12 19   CREATININE 0.9 1.2   CALCIUM 9.0 9.8   PROT 7.8 8.1   ALBUMIN 2.6* 2.7*   BILITOT 0.5 0.5   ALKPHOS 84 104   AST 14 13   ALT 6* 5*   ANIONGAP 7* 7*   EGFRNONAA >60.0 42.5*       Diagnostic Results:  I have reviewed all pertinent imaging results/findings within the past 24 hours.    Assessment/Plan:     * Cellulitis    · 81 yo f w/ MDS who presented worsening cellulitis of the right foot, despite oral antibiotics (Clindamycin 300mg TID)  · Blood cultures x 2 obtained 11/13/17- showing NGTD  · Afebrile for entire hospital stay  · Elevation of extremity helps with pain; continue PRN pain medication  · Foot pain radiating to toe- checked uric acid with history of gout- elevated to 6.4  · Etiology of R foot inflammation, pain, swelling: gout vs cellulitis  · R foot xray ordered 11/14/17- mild arthritic changes at 1st MTP joint; possible gout with small calcifications surrounding MTP joint and mild soft tissue swelling in this area  · Vanc discontinued yesterday; cipro PPX started & doxycycline x7 day course for cellulitis treatment         Acute gout involving toe of right foot    - Uric acid elevated to 6.4 (11/15). Given a dose of colchicine   - protonix started a steroid taper yesterday.   - scheduled a NSAID while hospitalized, however given renal  function was suggested to take Tylenol at home and avoid NSAIDs.           MDS (myelodysplastic syndrome)    · Recent diagnosis w/ bone cam in 8/3. 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  · Not a stem cell transplant candidate due to age.  · Started on Vidaza on 8/22. Finished cycle 3 last week. Plan for post cycle 4 bone marrow biopsy to assess response.  · Appointment scheduled with Dr. Wang for 11/27/17 for Vidaza & labs        Stage 2 chronic kidney disease    · Etiology is likely HTN.    · Control BP.        Hypothyroidism    · Levothyroxine 25 mcg daily. Last TSH wnl.         Hypertension    - SBP in 190s on valsartan 160 mg and verapamil 240 mg nightly.   - Schedule follow-up appointment w/ PCP.         Pancytopenia    - Transfuse for plts <10k and hgb <7            VTE Risk Mitigation         Ordered     Place sequential compression device  Until discontinued      11/14/17 0957     Medium Risk of VTE  Once      11/13/17 1549     Reason for no Mechanical VTE Prophylaxis  Once      11/13/17 1549          Disposition: Discharge today. Does not need H/H.     Juan Daniel Hsu, DO  Bone Marrow Transplant  [facility]

## 2017-11-16 NOTE — SUBJECTIVE & OBJECTIVE
Subjective:     Interval History:   Improving pain. Remained afebrile. . Plan for discharge today.     Objective:     Vital Signs (Most Recent):  Temp: 97.8 °F (36.6 °C) (11/16/17 0744)  Pulse: 60 (11/16/17 0744)  Resp: 18 (11/16/17 0744)  BP: (!) 160/75 (11/16/17 0744)  SpO2: 96 % (11/16/17 0744) Vital Signs (24h Range):  Temp:  [97.6 °F (36.4 °C)-98.2 °F (36.8 °C)] 97.8 °F (36.6 °C)  Pulse:  [59-70] 60  Resp:  [17-20] 18  SpO2:  [92 %-100 %] 96 %  BP: (150-191)/(60-82) 160/75     Weight: 54.7 kg (120 lb 9.5 oz)  Body mass index is 19.76 kg/m².  Body surface area is 1.59 meters squared.    ECOG SCORE         [unfilled]    Intake/Output - Last 3 Shifts       11/14 0700 - 11/15 0659 11/15 0700 - 11/16 0659 11/16 0700 - 11/17 0659    P.O.   120    Blood       Total Intake(mL/kg)   120 (2.2)    Urine (mL/kg/hr) 500 (0.4)      Stool 0 (0)      Total Output 500        Net -500   +120           Urine Occurrence   1 x    Stool Occurrence 0 x 0 x           Physical Exam   Constitutional: She is oriented to person, place, and time. She appears well-developed and well-nourished.   HENT:   Head: Normocephalic and atraumatic.   Right Ear: External ear normal.   Left Ear: External ear normal.   Eyes: Conjunctivae and EOM are normal. Pupils are equal, round, and reactive to light. Right eye exhibits no discharge. Left eye exhibits no discharge.   Neck: Normal range of motion. Neck supple.   Cardiovascular: Normal rate, regular rhythm, normal heart sounds and intact distal pulses.    No murmur heard.  Pulmonary/Chest: Effort normal and breath sounds normal. No respiratory distress. She has no wheezes.   Abdominal: Soft. Bowel sounds are normal. She exhibits no distension and no mass. There is no tenderness.   Musculoskeletal: Normal range of motion.   Neurological: She is alert and oriented to person, place, and time.   Skin: Skin is warm and dry. No rash noted. There is erythema.   R foot edema, erythema, painful to touch  to R great toe and MTP joint, warm to touch    Psychiatric: She has a normal mood and affect. Her behavior is normal. Judgment and thought content normal.   Nursing note and vitals reviewed.    Significant Labs:   CBC:     Recent Labs  Lab 11/15/17  0525 11/16/17  0508   WBC 1.28* 1.12*   HGB 7.7* 7.9*   HCT 22.9* 23.2*   PLT 49* 42*    and CMP:     Recent Labs  Lab 11/15/17  0525 11/16/17  0508    139   K 3.5 3.6    107   CO2 25 25   GLU 88 98   BUN 12 19   CREATININE 0.9 1.2   CALCIUM 9.0 9.8   PROT 7.8 8.1   ALBUMIN 2.6* 2.7*   BILITOT 0.5 0.5   ALKPHOS 84 104   AST 14 13   ALT 6* 5*   ANIONGAP 7* 7*   EGFRNONAA >60.0 42.5*       Diagnostic Results:  I have reviewed all pertinent imaging results/findings within the past 24 hours.

## 2017-11-16 NOTE — ASSESSMENT & PLAN NOTE
- SBP in 190s on valsartan 160 mg and verapamil 240 mg nightly.   - Schedule follow-up appointment w/ PCP.

## 2017-11-16 NOTE — PT/OT/SLP EVAL
"Physical Therapy  Evaluation/  DISCHARGE    Shara Rose   MRN: [id number]   Admitting Diagnosis: Cellulitis    PT Received On: 11/16/17  PT Start Time: 1158     PT Stop Time: 1207    PT Total Time (min): 9 min       Billable Minutes:  Evaluation 9    Diagnosis: Cellulitis      Past Medical History:   Diagnosis Date    Allergy     Anemia     Arthritis     Cataract     CKD (chronic kidney disease)     Gout, unspecified     Hypertension     Hypothyroidism     MDS (myelodysplastic syndrome) 8/14/2017    Menopause     PNA (pneumonia) 10/15/2017    Sickle cell trait     Trigger finger       Past Surgical History:   Procedure Laterality Date    APPENDECTOMY      HEMORRHOID SURGERY      HYSTERECTOMY         Referring NP: Diana Teague NP  Date referred to PT: 11/15/17    General Precautions: Standard, fall  Orthopedic Precautions: N/A   Braces:  (R foot with boot donned)       Do you have any cultural, spiritual, Yazdanism conflicts, given your current situation?: no    Patient History:  Lives With: other relative(s)  Living Arrangements: house  Home Accessibility: stairs to enter home  Home Layout: Able to live on 1st floor  Number of Stairs to Enter Home: 1  Stair Railings at Home: none  Transportation Available: car, family or friend will provide  Equipment Currently Used at Home: none  DME owned (not currently used): grab bars and transport w/c    Previous Level of Function:  Ambulation Skills: independent  Transfer Skills: independent  ADL Skills: independent  Work/Leisure Activity: independent    Subjective:  Communicated with nursing prior to session.  "I'm going home today."    Chief Complaint: Edema of the R foot not fitting into shoe  Patient goals: To go home    Pain/Comfort  Pain Rating 1: 0/10      Objective:   Patient found with: peripheral IV     Cognitive Exam:  Oriented to: Person, Place, Time and Situation    Follows Commands/attention: Follows multistep  commands  Communication: " clear/fluent  Safety awareness/insight to disability: intact    Physical Exam:  Postural examination/scapula alignment: No postural abnormalities identified    Skin integrity: Visible skin intact  Edema: Mild R foot    Sensation:   Intact  light/touch B LEs    Upper Extremity Range of Motion:  Right Upper Extremity: WFL  Left Upper Extremity: WFL    Upper Extremity Strength:  Right Upper Extremity: WFL  Left Upper Extremity: WFL    Lower Extremity Range of Motion:  Right Lower Extremity: WFL  Left Lower Extremity: WFL    Lower Extremity Strength:  Right Lower Extremity: WFL  Left Lower Extremity: WFL     Fine motor coordination:  Intact  Left hand thumb/finger opposition skills and Right hand thumb/finger opposition skills    Gross motor coordination: WFL    Functional Mobility:  Bed Mobility:  Supine to Sit:  (Pt presented sitting on couch)    Transfers:  Sit <> Stand Assistance: Independent  Sit <> Stand Assistive Device: No Assistive Device  Bed <> Chair Technique: Stand Pivot  Bed <> Chair Assistance: Independent  Bed <> Chair Assistive Device: No Assistive Device    Gait:   Gait Distance: Pt amb 150', with no episode of LOB, no SOB noted  Assistance 1: Independent  Gait Assistive Device: No device  Gait Pattern: reciprocal  Gait Deviation(s): decreased sharron, decreased weight-shifting ability    Balance:   Static Sit: NORMAL: No deviations seen in posture held statically  Dynamic Sit: NORMAL: No deviations seen in posture held dynamically  Static Stand: NORMAL: No deviations seen in posture held statically  Dynamic stand: GOOD+: Independent gait (with or without assistive device)    Therapeutic Activities and Exercises:  Whiteboard updated    AM-PAC 6 CLICK MOBILITY  How much help from another person does this patient currently need?   1 = Unable, Total/Dependent Assistance  2 = A lot, Maximum/Moderate Assistance  3 = A little, Minimum/Contact Guard/Supervision  4 = None, Modified  [place]/Independent    Turning over in bed (including adjusting bedclothes, sheets and blankets)?: 4  Sitting down on and standing up from a chair with arms (e.g., wheelchair, bedside commode, etc.): 4  Moving from lying on back to sitting on the side of the bed?: 4  Moving to and from a bed to a chair (including a wheelchair)?: 4  Need to walk in hospital room?: 4  Climbing 3-5 steps with a railing?: 4  Total Score: 24     AM-PAC Raw Score CMS G-Code Modifier Level of Impairment Assistance   6 % Total / Unable   7 - 9 CM 80 - 100% Maximal Assist   10 - 14 CL 60 - 80% Moderate Assist   15 - 19 CK 40 - 60% Moderate Assist   20 - 22 CJ 20 - 40% Minimal Assist   23 CI 1-20% SBA / CGA   24 CH 0% Independent/ Mod I     Patient left up in chair with all lines intact and call button in reach.    Assessment:   Shara Rose is a 82 y.o. female with a medical diagnosis of Cellulitis.  Pt is to be discharged at this time as pt is medically stable.  Pt is independent with all functional mobility assessed.  No history of falls.  No episodes of LOB or SOB with gait training.  Cont to don boot on the R foot sec to mild edema.      Rehab identified problem list/impairments: Rehab identified problem list/impairments: weakness, edema    Rehab potential is good.    Activity tolerance: Fair    Discharge recommendations: Discharge Facility/Level Of Care Needs: home     Barriers to discharge: Barriers to Discharge: None    Equipment recommendations: Equipment Needed After Discharge: none     GOALS:    Physical Therapy Goals     Not on file                PLAN:    Patient to be seen   to address the above listed problems via    Plan of Care expires:    Plan of Care reviewed with: patient          Leti Fisher, PT  11/16/2017

## 2017-11-16 NOTE — ASSESSMENT & PLAN NOTE
· 83 yo f w/ MDS who presented worsening cellulitis of the right foot, despite oral antibiotics (Clindamycin 300mg TID)  · Blood cultures x 2 obtained 11/13/17- showing NGTD  · Afebrile for entire hospital stay  · Elevation of extremity helps with pain; continue PRN pain medication  · Foot pain radiating to toe- checked uric acid with history of gout- elevated to 6.4  · Etiology of R foot inflammation, pain, swelling: gout vs cellulitis  · R foot xray ordered 11/14/17- mild arthritic changes at 1st MTP joint; possible gout with small calcifications surrounding MTP joint and mild soft tissue swelling in this area  · Vanc discontinued yesterday; cipro PPX started & doxycycline x7 day course for cellulitis treatment

## 2017-11-16 NOTE — ASSESSMENT & PLAN NOTE
- Uric acid elevated to 6.4 (11/15). Given a dose of colchicine   - protonix started a steroid taper yesterday.   - scheduled a NSAID while hospitalized, however given renal function was suggested to take Tylenol at home and avoid NSAIDs.

## 2017-11-16 NOTE — DISCHARGE SUMMARY
[facility]  Hematology  Bone Marrow Transplant  Discharge Summary      Patient Name: Shara Rose  MRN: [id number]  Admission Date: 11/13/2017  Hospital Length of Stay: 3 days  Discharge Date and Time: 11/16/2017 12:48 PM  Attending Physician: No att. providers found   Discharging Provider: Juan Daniel Hsu DO  Primary Care Provider: Alana Carter MD    HPI: Shara Rose is a 82 y.o. female w/ HTN, CKD, hypothyroidism, sickle cell trait and MDS who presented to the hospital as a direct admit for cellulitis (failed outpatient treatment). Initially diagnosed on 11/7 and prescribed clindamycin 300 mg TID. At that time she was complaining of pain, swelling and erythema. Denied any episodes of either fevers or chills. Did not recall any prior trauma, insect bite or any recent travel. No drainage or fluid collections have been noted. Had full range of her ankle, but had recently complained of worsening pain, swelling and erythema extending to her left toe. Had been compliant w/ PO clindamycin. Last received azacitidine on 11/7. , hgb 6.8 and plts 2 on her CBC w/ diff on admission.    Oncology Treatment Plan:   OP AZACITADINE 7-DAY (SUB-Q)      Oncology history:   Referred in July 2017 for a Hematology consultation for further evaluation of anemia. 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 Not a stem cell transplant candidate due to age. Started on Vidaza on 8/22. Finished cycle 3 last week. Plan for post cycle 4 bone marrow biopsy to assess response  She required admission from 10/14-10/19 for neutropenic fever attributed to CAP.     * No surgery found *     Hospital Course: 11/13/2017 Direct admit from BMT clinic for failed outpatient therapy of cellulitis. Blood cultures x 2 obtained. Started on IV vancomycin. Given 1 unit of pRBCs and plts.   11/14/17: Afebrile. Hypertensive with SBP in 190s- increased valasartan dose. PRN  hydralazine. Pancytopenic not requiring infusions today. Complains of R foot pain and patient stating she cannot walk today due to pain. PT/OT orders to start tomorrow because patient refusing today.   11/15/17: Afebrile. Blood cultures NGTD. Vanc discontinued and switched to cipro PPX and doxycycline x7day course for possible skin infection. Pancytopenic not requiring transfusions. R foot xray 11/14/17 shows mild arthritic changes at 1st MTP joint and possible gout with small calcifications surrounding MTP joint and mild soft tissue swelling around this joint. Pt states her pain is only located in the R MTP joint and great toe. Pt states pain is improving and similar to previous gout flare pain. Colchicine x1 given yesterday. Uric acid down to 5.5 today. Will start prednisone taper over 7 days. NSAID scheduled. Likely DC tomorrow. Will need allopurinol at discharge.   11/16/2017: Remained afebrile. Infectious work-up unremarkable. D/c today w/ prednisone taper and doxycycline. Antibiotic for 7 days. Pain improving.         Significant Diagnostic Studies: Labs:   CMP   Recent Labs  Lab 11/15/17  0525 11/16/17  0508    139   K 3.5 3.6    107   CO2 25 25   GLU 88 98   BUN 12 19   CREATININE 0.9 1.2   CALCIUM 9.0 9.8   PROT 7.8 8.1   ALBUMIN 2.6* 2.7*   BILITOT 0.5 0.5   ALKPHOS 84 104   AST 14 13   ALT 6* 5*   ANIONGAP 7* 7*   ESTGFRAFRICA >60.0 49.0*   EGFRNONAA >60.0 42.5*    and CBC   Recent Labs  Lab 11/15/17  0525 11/16/17  0508   WBC 1.28* 1.12*   HGB 7.7* 7.9*   HCT 22.9* 23.2*   PLT 49* 42*       Pending Diagnostic Studies:     None        Final Active Diagnoses:    Diagnosis Date Noted POA    PRINCIPAL PROBLEM:  Cellulitis [L03.90] 11/13/2017 Yes    Acute gout involving toe of right foot [M10.9] 11/14/2017 Yes    MDS (myelodysplastic syndrome) [D46.9] 08/14/2017 Yes    Hypertension [I10]  Yes    Hypothyroidism [E03.9]  Yes    Stage 2 chronic kidney disease [N18.2]  Yes    Pancytopenia  [D61.818] 08/14/2014 Yes      Problems Resolved During this Admission:    Diagnosis Date Noted Date Resolved POA           * Cellulitis     · 83 yo f w/ MDS who presented worsening cellulitis of the right foot, despite oral antibiotics (Clindamycin 300mg TID)  · Blood cultures x 2 obtained 11/13/17- showing NGTD  · Afebrile for entire hospital stay  · Elevation of extremity helps with pain; continue PRN pain medication  · Foot pain radiating to toe- checked uric acid with history of gout- elevated to 6.4  · Etiology of R foot inflammation, pain, swelling: gout vs cellulitis  · R foot xray ordered 11/14/17- mild arthritic changes at 1st MTP joint; possible gout with small calcifications surrounding MTP joint and mild soft tissue swelling in this area  · Vanc discontinued yesterday; cipro PPX started & doxycycline x7 day course for cellulitis treatment        Acute gout involving toe of right foot     - Uric acid elevated to 6.4 (11/15). Given a dose of colchicine   - protonix started a steroid taper yesterday.   - scheduled a NSAID while hospitalized, however given renal function was suggested to take Tylenol at home and avoid NSAIDs.           MDS (myelodysplastic syndrome)     · Recent diagnosis w/ bone cam in 8/3. 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  · Not a stem cell transplant candidate due to age.  · Started on Vidaza on 8/22. Finished cycle 3 last week. Plan for post cycle 4 bone marrow biopsy to assess response.  · Appointment scheduled with Dr. Wang for 11/27/17 for Vidaza & labs       Stage 2 chronic kidney disease     · Etiology is likely HTN.    · Control BP.       Hypothyroidism     · Levothyroxine 25 mcg daily. Last TSH wnl.        Hypertension     - SBP in 190s on valsartan 160 mg and verapamil 240 mg nightly.   - Schedule follow-up appointment w/ PCP.        Pancytopenia     - Transfuse for plts <10k and hgb <7     Discharged Condition: fair    Disposition: Home or  Self Care    Follow Up:  Follow-up Information     [facility] On 11/20/2017.    Specialty:  Lab  Why:  Labs- 11:00am  Contact information:  [address]  [facility] [id number] [phone]  Additional information:  [facility] 3rd Floor           [facility] On 11/27/2017.    Specialty:  Lab  Why:  Labs- 1:40pm  Contact information:  [address]  [facility] [id number] [phone]  Additional information:  [facility] 3rd Floor           Juan Wang MD On 11/27/2017.    Specialty:  Hematology and Oncology  Why:  follow up- 2:40pm  Contact information:  [address]  [facility] [address]  [phone]             Call Alana Carter MD.    Specialty:  Internal Medicine  Why:  High blood pressure  Contact information:  [address] [facility] [address]  [phone]                 Patient Instructions:   No discharge procedures on file.  Medications:  Reconciled Home Medications:   Discharge Medication List as of 11/16/2017 11:44 AM      START taking these medications    Details   allopurinol (ZYLOPRIM) 100 MG tablet Take 1 tablet (100 mg total) by mouth once daily. For gout, Starting Fri 11/17/2017, Normal      doxycycline (VIBRA-TABS) 100 MG tablet Take 1 tablet (100 mg total) by mouth every 12 (twelve) hours., Starting Thu 11/16/2017, Until Wed 11/22/2017, Normal      pantoprazole (PROTONIX) 40 MG tablet Take 1 tablet (40 mg total) by mouth once daily. While on prednisone, Starting Fri 11/17/2017, Until Sat 11/17/2018, Normal      predniSONE (DELTASONE) 5 MG tablet Take 4 tabs by mouth on 11/17, 3 tabs on 11/18, 2 tab on 11/19, 1 tab on 11/20 and half tab on 11/21, Normal         CONTINUE these medications which have CHANGED    Details   valsartan (DIOVAN) 160 MG tablet Take 1 tablet (160 mg total) by mouth once daily., Starting Thu 11/16/2017, Normal         CONTINUE these medications which  have NOT CHANGED    Details   acyclovir (ZOVIRAX) 200 MG capsule Take 2 capsules (400 mg total) by mouth 2 (two) times daily., Starting Thu 10/19/2017, Until Fri 10/19/2018, Normal      AZACITIDINE (VIDAZA INJ) Inject as directed., Historical Med      ciprofloxacin HCl (CIPRO) 500 MG tablet Take 1 tablet (500 mg total) by mouth 2 (two) times daily., Starting Tue 10/24/2017, Normal      clotrimazole-betamethasone 1-0.05% (LOTRISONE) cream Apply topically 2 (two) times daily., Starting 11/16/2015, Until Discontinued, Normal      fluconazole (DIFLUCAN) 200 MG Tab Take 1 tablet (200 mg total) by mouth once daily., Starting Fri 10/20/2017, Until Sun 11/19/2017, Normal      levothyroxine (SYNTHROID) 25 MCG tablet TAKE 1 TABLET EVERY DAY, Normal      meclizine (ANTIVERT) 25 mg tablet TK 1 T PO BID FOR 10 DAYS PRF DIZZINESS, Historical Med      ondansetron (ZOFRAN) 8 MG tablet Take 1 tablet (8 mg total) by mouth every 12 (twelve) hours as needed for Nausea., Starting Tue 11/7/2017, Until Wed 11/7/2018, Normal      verapamil (CALAN-SR) 240 MG CR tablet TAKE 1 TABLET EVERY DAY, Normal             Juan Daniel Hsu DO  Bone Marrow Transplant  [facility]

## 2017-11-16 NOTE — PLAN OF CARE
Problem: Patient Care Overview  Goal: Plan of Care Review  Outcome: Ongoing (interventions implemented as appropriate)  Pt remains free from injury. Pt ambulating independently without pain. Pt remains afebrile and started on PO antibiotics and prednisone. Pain controlled with scheduled pain medication. Will continue to monitor

## 2017-11-16 NOTE — PLAN OF CARE
MDR's with Dr Rajput.  Patient reports improvement in pain.  She has been afebrile since admit with negative cultures.  Planning to d/c home today with family support.  No HH needs.  OT rec's a RW but the patient states that she has one in the home already.  Patient is aware of f/u.  Patient states that she will have a ride home.  Will continue to follow.      Future Appointments  Date Time Provider Department Center   11/20/2017 11:00 AM LAB, [facility]EMON [facility]CER BLDG [facility]   11/27/2017 1:40 PM LAB, HEMONC CANCER BLDG [facility]   11/27/2017 2:40 PM Juan Wang MD [facility]   11/27/2017 3:15 PM INJECTION, [facility]   11/28/2017 11:00 AM INJECTION, [facility]   11/29/2017 11:00 AM INJECTION, [facility]   11/30/2017 11:00 AM INJECTION, [facility]   12/1/2017 11:00 AM INJECTION, [facility]   12/4/2017 11:00 AM INJECTION, [facility]   12/5/2017 11:00 AM INJECTION, [facility]        11/16/17 1110   Final Note   Assessment Type Final Discharge Note   Discharge Disposition Home   What phone number can be called within the next 1-3 days to see how you are doing after discharge? ([phone])   Hospital Follow Up  Appt(s) scheduled? Yes   Discharge plans and expectations educations in teach back method with documentation complete? Yes

## 2017-11-18 LAB
BACTERIA BLD CULT: NORMAL
BACTERIA BLD CULT: NORMAL

## 2017-11-20 ENCOUNTER — TELEPHONE (OUTPATIENT)
Dept: HEMATOLOGY/ONCOLOGY | Facility: CLINIC | Age: 82
End: 2017-11-20

## 2017-11-27 ENCOUNTER — INFUSION (OUTPATIENT)
Dept: INFUSION THERAPY | Facility: HOSPITAL | Age: 82
End: 2017-11-27
Attending: INTERNAL MEDICINE
Payer: MEDICARE

## 2017-11-27 ENCOUNTER — TELEPHONE (OUTPATIENT)
Dept: HEMATOLOGY/ONCOLOGY | Facility: CLINIC | Age: 82
End: 2017-11-27

## 2017-11-27 ENCOUNTER — OFFICE VISIT (OUTPATIENT)
Dept: HEMATOLOGY/ONCOLOGY | Facility: CLINIC | Age: 82
End: 2017-11-27
Payer: MEDICARE

## 2017-11-27 VITALS
HEART RATE: 48 BPM | SYSTOLIC BLOOD PRESSURE: 151 MMHG | TEMPERATURE: 98 F | DIASTOLIC BLOOD PRESSURE: 64 MMHG | RESPIRATION RATE: 20 BRPM

## 2017-11-27 VITALS
DIASTOLIC BLOOD PRESSURE: 74 MMHG | WEIGHT: 121.69 LBS | OXYGEN SATURATION: 92 % | RESPIRATION RATE: 16 BRPM | HEIGHT: 65 IN | BODY MASS INDEX: 20.28 KG/M2 | SYSTOLIC BLOOD PRESSURE: 181 MMHG | HEART RATE: 63 BPM | TEMPERATURE: 97 F

## 2017-11-27 DIAGNOSIS — D46.9 MDS (MYELODYSPLASTIC SYNDROME): ICD-10-CM

## 2017-11-27 DIAGNOSIS — D46.9 MDS (MYELODYSPLASTIC SYNDROME): Primary | ICD-10-CM

## 2017-11-27 LAB
BLD PROD TYP BPU: NORMAL
BLD PROD TYP BPU: NORMAL
BLOOD UNIT EXPIRATION DATE: NORMAL
BLOOD UNIT EXPIRATION DATE: NORMAL
BLOOD UNIT TYPE CODE: 5100
BLOOD UNIT TYPE CODE: 6200
BLOOD UNIT TYPE: NORMAL
BLOOD UNIT TYPE: NORMAL
CODING SYSTEM: NORMAL
CODING SYSTEM: NORMAL
DISPENSE STATUS: NORMAL
DISPENSE STATUS: NORMAL
NUM UNITS TRANS PACKED RBC: NORMAL
NUM UNITS TRANS WBC-POOR PLATPHERESIS: NORMAL

## 2017-11-27 PROCEDURE — 86920 COMPATIBILITY TEST SPIN: CPT

## 2017-11-27 PROCEDURE — 99999 PR PBB SHADOW E&M-EST. PATIENT-LVL IV: CPT | Mod: PBBFAC,,, | Performed by: INTERNAL MEDICINE

## 2017-11-27 PROCEDURE — P9040 RBC LEUKOREDUCED IRRADIATED: HCPCS

## 2017-11-27 PROCEDURE — 99213 OFFICE O/P EST LOW 20 MIN: CPT | Mod: S$GLB,,, | Performed by: INTERNAL MEDICINE

## 2017-11-27 PROCEDURE — 25000003 PHARM REV CODE 250: Performed by: INTERNAL MEDICINE

## 2017-11-27 PROCEDURE — P9037 PLATE PHERES LEUKOREDU IRRAD: HCPCS

## 2017-11-27 PROCEDURE — 96401 CHEMO ANTI-NEOPL SQ/IM: CPT

## 2017-11-27 PROCEDURE — 36430 TRANSFUSION BLD/BLD COMPNT: CPT

## 2017-11-27 PROCEDURE — 63600175 PHARM REV CODE 636 W HCPCS: Performed by: INTERNAL MEDICINE

## 2017-11-27 RX ORDER — DIPHENHYDRAMINE HCL 25 MG
25 CAPSULE ORAL
Status: CANCELLED | OUTPATIENT
Start: 2017-11-27

## 2017-11-27 RX ORDER — AZACITIDINE 100 MG/1
75 INJECTION, POWDER, LYOPHILIZED, FOR SOLUTION INTRAVENOUS; SUBCUTANEOUS
Status: CANCELLED | OUTPATIENT
Start: 2017-12-01

## 2017-11-27 RX ORDER — ACETAMINOPHEN 325 MG/1
650 TABLET ORAL
Status: COMPLETED | OUTPATIENT
Start: 2017-11-27 | End: 2017-11-27

## 2017-11-27 RX ORDER — HYDROCODONE BITARTRATE AND ACETAMINOPHEN 500; 5 MG/1; MG/1
TABLET ORAL ONCE
Status: COMPLETED | OUTPATIENT
Start: 2017-11-27 | End: 2017-11-27

## 2017-11-27 RX ORDER — AZACITIDINE 100 MG/1
75 INJECTION, POWDER, LYOPHILIZED, FOR SOLUTION INTRAVENOUS; SUBCUTANEOUS
Status: CANCELLED | OUTPATIENT
Start: 2017-12-04

## 2017-11-27 RX ORDER — AZACITIDINE 100 MG/1
75 INJECTION, POWDER, LYOPHILIZED, FOR SOLUTION INTRAVENOUS; SUBCUTANEOUS
Status: CANCELLED | OUTPATIENT
Start: 2017-12-05

## 2017-11-27 RX ORDER — AZACITIDINE 100 MG/1
75 INJECTION, POWDER, LYOPHILIZED, FOR SOLUTION INTRAVENOUS; SUBCUTANEOUS
Status: CANCELLED | OUTPATIENT
Start: 2017-11-29

## 2017-11-27 RX ORDER — DIPHENHYDRAMINE HCL 25 MG
25 CAPSULE ORAL
Status: COMPLETED | OUTPATIENT
Start: 2017-11-27 | End: 2017-11-27

## 2017-11-27 RX ORDER — AZACITIDINE 100 MG/1
75 INJECTION, POWDER, LYOPHILIZED, FOR SOLUTION INTRAVENOUS; SUBCUTANEOUS
Status: COMPLETED | OUTPATIENT
Start: 2017-11-27 | End: 2017-11-27

## 2017-11-27 RX ORDER — AZACITIDINE 100 MG/1
75 INJECTION, POWDER, LYOPHILIZED, FOR SOLUTION INTRAVENOUS; SUBCUTANEOUS
Status: CANCELLED | OUTPATIENT
Start: 2017-11-30

## 2017-11-27 RX ORDER — AZACITIDINE 100 MG/1
75 INJECTION, POWDER, LYOPHILIZED, FOR SOLUTION INTRAVENOUS; SUBCUTANEOUS
Status: CANCELLED | OUTPATIENT
Start: 2017-11-27

## 2017-11-27 RX ORDER — AZACITIDINE 100 MG/1
75 INJECTION, POWDER, LYOPHILIZED, FOR SOLUTION INTRAVENOUS; SUBCUTANEOUS
Status: CANCELLED | OUTPATIENT
Start: 2017-11-28

## 2017-11-27 RX ORDER — HYDROCODONE BITARTRATE AND ACETAMINOPHEN 500; 5 MG/1; MG/1
TABLET ORAL ONCE
Status: CANCELLED | OUTPATIENT
Start: 2017-11-27 | End: 2017-11-27

## 2017-11-27 RX ORDER — ACETAMINOPHEN 325 MG/1
650 TABLET ORAL
Status: CANCELLED | OUTPATIENT
Start: 2017-11-27

## 2017-11-27 RX ADMIN — DIPHENHYDRAMINE HYDROCHLORIDE 25 MG: 25 CAPSULE ORAL at 03:11

## 2017-11-27 RX ADMIN — AZACITIDINE 120 MG: 100 INJECTION, POWDER, LYOPHILIZED, FOR SOLUTION INTRAVENOUS; SUBCUTANEOUS at 04:11

## 2017-11-27 RX ADMIN — ACETAMINOPHEN 650 MG: 325 TABLET ORAL at 03:11

## 2017-11-27 RX ADMIN — SODIUM CHLORIDE: 900 INJECTION, SOLUTION INTRAVENOUS at 03:11

## 2017-11-27 NOTE — NURSING
Pt is in infusion chair receiving transfusion.  Vidaza D1 given and tolerated SQ x3 injections to right side of abd.

## 2017-11-27 NOTE — PROGRESS NOTES
Subjective:       Patient ID: Shara Rose is a 82 y.o. female.    Chief Complaint: No chief complaint on file.    HPI  Ms Rose is is here to start cycle 4 of azacitidine for high risk R-IPSS MDS with background sickle trait, hypothyroidism and chronic kidney disease.  Bone marrow biopsy august 2017 showed high risk MDS and she started palliative vidaza.  She is now s/p 3 cycles.     She required admission from 10/14-10/19 for neutropenic fever attributed to CAP.  She was discharged on po levaquin and is doing well since d/c. Afebrile.      She comes to clinic with friend. Fatigued but otherwise without complaints.      Review of Systems   Constitutional: Positive for fatigue. Negative for diaphoresis and fever.   HENT: Negative for mouth sores.    Respiratory: Positive for shortness of breath. Negative for cough.    Cardiovascular: Negative for leg swelling.   Gastrointestinal: Negative for blood in stool.   Genitourinary: Negative for dysuria.   Skin: Negative for rash.   Neurological: Positive for weakness.   Hematological: Negative for adenopathy.   Psychiatric/Behavioral: Negative for confusion.       Objective:      Physical Exam   Constitutional: She is oriented to person, place, and time. She appears well-developed and well-nourished. No distress.   HENT:   Head: Normocephalic and atraumatic.   Mouth/Throat: No oropharyngeal exudate.   Eyes: Conjunctivae are normal. Pupils are equal, round, and reactive to light.   Neck: Neck supple.   Cardiovascular: Normal rate and regular rhythm.    Pulmonary/Chest: Effort normal and breath sounds normal. She has no rales.   Abdominal: Soft. Bowel sounds are normal. She exhibits no mass. There is no tenderness.   Musculoskeletal: Normal range of motion. She exhibits no edema.   Lymphadenopathy:     She has no cervical adenopathy.   Neurological: She is alert and oriented to person, place, and time.   Skin: Skin is warm and dry. No rash noted.   Psychiatric: She has a  normal mood and affect. Thought content normal.       Assessment:       1. 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 R-IPSS  -not a stem cell transplant candidate due to age/comorbidities  -can consider addition of GASTON if no response in anemia to vidaza   -pancytopenia due to MDS; suspect anemia augmented buy underlying sickle trait ;  transfuse for plt >10, hgb >7; no indication for transfusion today   -reviewed neutropenic and bleeding precautions with patient  -prn zofran for home use for CINV  -plan for weekly labs and prn transfusions  -for MGUS; anemia likely due to MDS; no hypercalcemia, Cr at baseline; will discuss skeletal survey at next appt but lack of clonal plasma cells in bone marrow make underlying myeloma highly unlikely     2)ID  -Cipro, acyclovir and fluconazole ppx    Follow up with Dr. Wang in 2 weeks with weekly CBC and type/screen.

## 2017-11-28 ENCOUNTER — INFUSION (OUTPATIENT)
Dept: INFUSION THERAPY | Facility: HOSPITAL | Age: 82
End: 2017-11-28
Attending: INTERNAL MEDICINE
Payer: MEDICARE

## 2017-11-28 VITALS
SYSTOLIC BLOOD PRESSURE: 160 MMHG | RESPIRATION RATE: 18 BRPM | HEART RATE: 64 BPM | DIASTOLIC BLOOD PRESSURE: 70 MMHG | TEMPERATURE: 98 F

## 2017-11-28 DIAGNOSIS — D46.9 MDS (MYELODYSPLASTIC SYNDROME): Primary | ICD-10-CM

## 2017-11-28 PROCEDURE — 63600175 PHARM REV CODE 636 W HCPCS: Performed by: INTERNAL MEDICINE

## 2017-11-28 PROCEDURE — 96401 CHEMO ANTI-NEOPL SQ/IM: CPT

## 2017-11-28 RX ORDER — AZACITIDINE 100 MG/1
75 INJECTION, POWDER, LYOPHILIZED, FOR SOLUTION INTRAVENOUS; SUBCUTANEOUS
Status: COMPLETED | OUTPATIENT
Start: 2017-11-28 | End: 2017-11-28

## 2017-11-28 RX ADMIN — AZACITIDINE 120 MG: 100 INJECTION, POWDER, LYOPHILIZED, FOR SOLUTION INTRAVENOUS; SUBCUTANEOUS at 01:11

## 2017-11-28 NOTE — NURSING
1830: received report from DEENA Beasley. Assumed patient care. Blood transfusion in process. Will continue to monitor.

## 2017-11-28 NOTE — NURSING
Pt arrived for Vidaza D2.  Pt tolerated injection SQ x3 to left side of abd.  Discharged to home in wheelchair with friend.

## 2017-11-28 NOTE — PLAN OF CARE
Problem: Patient Care Overview  Goal: Plan of Care Review  Outcome: Ongoing (interventions implemented as appropriate)  Patient tolerated blood and platelet transfusion. Also, educated patient that she takes verapamil and to check bp and hr before each admin and to hold and call MD if hr below 55 and sbp below 100. Patient and grandaughter also verbalized understanding to report to ED if any cp or sob.

## 2017-11-29 ENCOUNTER — INFUSION (OUTPATIENT)
Dept: INFUSION THERAPY | Facility: HOSPITAL | Age: 82
End: 2017-11-29
Attending: INTERNAL MEDICINE
Payer: MEDICARE

## 2017-11-29 VITALS
SYSTOLIC BLOOD PRESSURE: 175 MMHG | DIASTOLIC BLOOD PRESSURE: 72 MMHG | TEMPERATURE: 98 F | HEART RATE: 77 BPM | RESPIRATION RATE: 18 BRPM

## 2017-11-29 DIAGNOSIS — D46.9 MDS (MYELODYSPLASTIC SYNDROME): Primary | ICD-10-CM

## 2017-11-29 PROCEDURE — 96401 CHEMO ANTI-NEOPL SQ/IM: CPT

## 2017-11-29 PROCEDURE — 63600175 PHARM REV CODE 636 W HCPCS: Performed by: INTERNAL MEDICINE

## 2017-11-29 RX ORDER — AZACITIDINE 100 MG/1
75 INJECTION, POWDER, LYOPHILIZED, FOR SOLUTION INTRAVENOUS; SUBCUTANEOUS
Status: COMPLETED | OUTPATIENT
Start: 2017-11-29 | End: 2017-11-29

## 2017-11-29 RX ADMIN — AZACITIDINE 120 MG: 100 INJECTION, POWDER, LYOPHILIZED, FOR SOLUTION INTRAVENOUS; SUBCUTANEOUS at 02:11

## 2017-11-29 NOTE — NURSING
Pt arrived for vidaza D3.  Pt tolerated injection SQ to right side of abd x2.  Discharged to home with friend.

## 2017-11-30 ENCOUNTER — INFUSION (OUTPATIENT)
Dept: INFUSION THERAPY | Facility: HOSPITAL | Age: 82
End: 2017-11-30
Attending: INTERNAL MEDICINE
Payer: MEDICARE

## 2017-11-30 VITALS — DIASTOLIC BLOOD PRESSURE: 64 MMHG | RESPIRATION RATE: 18 BRPM | HEART RATE: 64 BPM | SYSTOLIC BLOOD PRESSURE: 172 MMHG

## 2017-11-30 DIAGNOSIS — D46.9 MDS (MYELODYSPLASTIC SYNDROME): Primary | ICD-10-CM

## 2017-11-30 PROCEDURE — 96401 CHEMO ANTI-NEOPL SQ/IM: CPT

## 2017-11-30 PROCEDURE — 63600175 PHARM REV CODE 636 W HCPCS: Performed by: INTERNAL MEDICINE

## 2017-11-30 RX ORDER — AZACITIDINE 100 MG/1
75 INJECTION, POWDER, LYOPHILIZED, FOR SOLUTION INTRAVENOUS; SUBCUTANEOUS
Status: COMPLETED | OUTPATIENT
Start: 2017-11-30 | End: 2017-11-30

## 2017-11-30 RX ADMIN — AZACITIDINE 120 MG: 100 INJECTION, POWDER, LYOPHILIZED, FOR SOLUTION INTRAVENOUS; SUBCUTANEOUS at 02:11

## 2017-11-30 NOTE — NURSING
Vidaza injections given without incidence, tolerated well. Calendar given, patient discharged to home, nad

## 2017-12-01 ENCOUNTER — INFUSION (OUTPATIENT)
Dept: INFUSION THERAPY | Facility: HOSPITAL | Age: 82
End: 2017-12-01
Attending: INTERNAL MEDICINE
Payer: MEDICARE

## 2017-12-01 VITALS
SYSTOLIC BLOOD PRESSURE: 161 MMHG | DIASTOLIC BLOOD PRESSURE: 71 MMHG | TEMPERATURE: 98 F | RESPIRATION RATE: 18 BRPM | HEART RATE: 60 BPM

## 2017-12-01 DIAGNOSIS — D46.9 MDS (MYELODYSPLASTIC SYNDROME): Primary | ICD-10-CM

## 2017-12-01 PROCEDURE — 96401 CHEMO ANTI-NEOPL SQ/IM: CPT

## 2017-12-01 PROCEDURE — 63600175 PHARM REV CODE 636 W HCPCS: Mod: JW | Performed by: INTERNAL MEDICINE

## 2017-12-01 RX ORDER — AZACITIDINE 100 MG/1
75 INJECTION, POWDER, LYOPHILIZED, FOR SOLUTION INTRAVENOUS; SUBCUTANEOUS
Status: COMPLETED | OUTPATIENT
Start: 2017-12-01 | End: 2017-12-01

## 2017-12-01 RX ADMIN — AZACITIDINE 120 MG: 100 INJECTION, POWDER, LYOPHILIZED, FOR SOLUTION INTRAVENOUS; SUBCUTANEOUS at 02:12

## 2017-12-01 NOTE — NURSING
Pt arrived for Vidaza D5.  Pt tolerated injectionSQ x3 to left side of abd.  Discharged to home and will RTC on Monday.

## 2017-12-04 ENCOUNTER — INFUSION (OUTPATIENT)
Dept: INFUSION THERAPY | Facility: HOSPITAL | Age: 82
End: 2017-12-04
Attending: INTERNAL MEDICINE
Payer: MEDICARE

## 2017-12-04 VITALS
RESPIRATION RATE: 18 BRPM | TEMPERATURE: 98 F | SYSTOLIC BLOOD PRESSURE: 180 MMHG | DIASTOLIC BLOOD PRESSURE: 76 MMHG | HEART RATE: 70 BPM

## 2017-12-04 DIAGNOSIS — D46.9 MDS (MYELODYSPLASTIC SYNDROME): Primary | ICD-10-CM

## 2017-12-04 PROCEDURE — 96401 CHEMO ANTI-NEOPL SQ/IM: CPT

## 2017-12-04 PROCEDURE — 63600175 PHARM REV CODE 636 W HCPCS: Mod: JW | Performed by: INTERNAL MEDICINE

## 2017-12-04 RX ORDER — AZACITIDINE 100 MG/1
75 INJECTION, POWDER, LYOPHILIZED, FOR SOLUTION INTRAVENOUS; SUBCUTANEOUS
Status: COMPLETED | OUTPATIENT
Start: 2017-12-04 | End: 2017-12-04

## 2017-12-04 RX ADMIN — AZACITIDINE 120 MG: 100 INJECTION, POWDER, LYOPHILIZED, FOR SOLUTION INTRAVENOUS; SUBCUTANEOUS at 02:12

## 2017-12-04 NOTE — NURSING
Pt arrived for Vidaza D8.  Pt tolerated injection SQ X3 to right abd.  Discharged to home with friends and appt calendar.

## 2017-12-05 ENCOUNTER — INFUSION (OUTPATIENT)
Dept: INFUSION THERAPY | Facility: HOSPITAL | Age: 82
End: 2017-12-05
Attending: INTERNAL MEDICINE
Payer: MEDICARE

## 2017-12-05 VITALS — DIASTOLIC BLOOD PRESSURE: 63 MMHG | HEART RATE: 93 BPM | SYSTOLIC BLOOD PRESSURE: 144 MMHG

## 2017-12-05 DIAGNOSIS — D46.9 MDS (MYELODYSPLASTIC SYNDROME): Primary | ICD-10-CM

## 2017-12-05 PROCEDURE — 63600175 PHARM REV CODE 636 W HCPCS: Performed by: INTERNAL MEDICINE

## 2017-12-05 PROCEDURE — 96401 CHEMO ANTI-NEOPL SQ/IM: CPT

## 2017-12-05 RX ORDER — AZACITIDINE 100 MG/1
75 INJECTION, POWDER, LYOPHILIZED, FOR SOLUTION INTRAVENOUS; SUBCUTANEOUS
Status: COMPLETED | OUTPATIENT
Start: 2017-12-05 | End: 2017-12-05

## 2017-12-05 RX ADMIN — AZACITIDINE 120 MG: 100 INJECTION, POWDER, LYOPHILIZED, FOR SOLUTION INTRAVENOUS; SUBCUTANEOUS at 02:12

## 2017-12-05 NOTE — NURSING
Patient here for injections-vidaza given in 3 divided doses-still with gout in right foot-tolerated injections well.

## 2017-12-06 ENCOUNTER — TELEPHONE (OUTPATIENT)
Dept: HEMATOLOGY/ONCOLOGY | Facility: CLINIC | Age: 82
End: 2017-12-06

## 2017-12-06 NOTE — TELEPHONE ENCOUNTER
"----- Message from Chrissie Christine sent at 12/6/2017 10:33 AM CST -----  Contact: Radha with home health  Radha calling regarding pt gout. She states that pt is in a lot of pain and needs something now. She can not wait until she sees the doctor      Radha call back number [phone]  Spoke with pt at 1135am on 12/06/17, explained to pt that Dr. Wang recommends that she comes in to the clinic to be evaluated, pt states that she does not have a ride and will not have a ride until her clinic appointment on Monday, pt c/o swelling, warmth and a "dark " color to her toe. Instructed pt call 911 if symptoms worsens or do not improve before her clinic appointment on Monday, Pt stated she would not call 911 because it would cost her $700 for the ambulance ride and $100 for co-pay. Also informed pt that Dr. Wang would call in a prescription for pain medication, pt verbalized understanding.  Hina   "

## 2017-12-11 ENCOUNTER — TELEPHONE (OUTPATIENT)
Dept: HEMATOLOGY/ONCOLOGY | Facility: CLINIC | Age: 82
End: 2017-12-11

## 2017-12-11 ENCOUNTER — HOSPITAL ENCOUNTER (INPATIENT)
Facility: HOSPITAL | Age: 82
LOS: 1 days | Discharge: HOME | DRG: 812 | End: 2017-12-12
Attending: EMERGENCY MEDICINE | Admitting: INTERNAL MEDICINE
Payer: MEDICARE

## 2017-12-11 DIAGNOSIS — D46.9 MDS (MYELODYSPLASTIC SYNDROME): ICD-10-CM

## 2017-12-11 DIAGNOSIS — D64.9 ANEMIA, UNSPECIFIED TYPE: ICD-10-CM

## 2017-12-11 DIAGNOSIS — D61.818 PANCYTOPENIA: ICD-10-CM

## 2017-12-11 DIAGNOSIS — R53.81 DEBILITY: ICD-10-CM

## 2017-12-11 DIAGNOSIS — D69.6 THROMBOCYTOPENIA: Primary | ICD-10-CM

## 2017-12-11 DIAGNOSIS — M10.9 ACUTE GOUT INVOLVING TOE OF RIGHT FOOT, UNSPECIFIED CAUSE: ICD-10-CM

## 2017-12-11 LAB
ABO + RH BLD: NORMAL
ALBUMIN SERPL BCP-MCNC: 3.1 G/DL
ALP SERPL-CCNC: 104 U/L
ALT SERPL W/O P-5'-P-CCNC: 5 U/L
ANION GAP SERPL CALC-SCNC: 6 MMOL/L
ANISOCYTOSIS BLD QL SMEAR: SLIGHT
AST SERPL-CCNC: 13 U/L
BASOPHILS # BLD AUTO: ABNORMAL K/UL
BASOPHILS NFR BLD: 0 %
BILIRUB SERPL-MCNC: 0.9 MG/DL
BLD GP AB SCN CELLS X3 SERPL QL: NORMAL
BUN SERPL-MCNC: 15 MG/DL
CALCIUM SERPL-MCNC: 9.5 MG/DL
CHLORIDE SERPL-SCNC: 110 MMOL/L
CO2 SERPL-SCNC: 27 MMOL/L
CREAT SERPL-MCNC: 1 MG/DL
DIFFERENTIAL METHOD: ABNORMAL
EOSINOPHIL # BLD AUTO: ABNORMAL K/UL
EOSINOPHIL NFR BLD: 0 %
ERYTHROCYTE [DISTWIDTH] IN BLOOD BY AUTOMATED COUNT: 13.7 %
EST. GFR  (AFRICAN AMERICAN): >60 ML/MIN/1.73 M^2
EST. GFR  (NON AFRICAN AMERICAN): 53 ML/MIN/1.73 M^2
GLUCOSE SERPL-MCNC: 97 MG/DL
HCT VFR BLD AUTO: 13.2 %
HGB BLD-MCNC: 4.4 G/DL
HYPOCHROMIA BLD QL SMEAR: ABNORMAL
IMM GRANULOCYTES # BLD AUTO: ABNORMAL K/UL
IMM GRANULOCYTES NFR BLD AUTO: ABNORMAL %
LYMPHOCYTES # BLD AUTO: ABNORMAL K/UL
LYMPHOCYTES NFR BLD: 96 %
MCH RBC QN AUTO: 27.8 PG
MCHC RBC AUTO-ENTMCNC: 33.3 G/DL
MCV RBC AUTO: 84 FL
MONOCYTES # BLD AUTO: ABNORMAL K/UL
MONOCYTES NFR BLD: 1.3 %
NEUTROPHILS # BLD AUTO: ABNORMAL K/UL
NEUTROPHILS NFR BLD: 2.7 %
NRBC BLD-RTO: 0 /100 WBC
OVALOCYTES BLD QL SMEAR: ABNORMAL
PLATELET # BLD AUTO: 2 K/UL
PLATELET BLD QL SMEAR: ABNORMAL
PMV BLD AUTO: ABNORMAL FL
POIKILOCYTOSIS BLD QL SMEAR: SLIGHT
POTASSIUM SERPL-SCNC: 3.6 MMOL/L
PROT SERPL-MCNC: 8.1 G/DL
RBC # BLD AUTO: 1.58 M/UL
SODIUM SERPL-SCNC: 143 MMOL/L
TARGETS BLD QL SMEAR: ABNORMAL
URATE SERPL-MCNC: 5.1 MG/DL
WBC # BLD AUTO: 1.29 K/UL

## 2017-12-11 PROCEDURE — 12000002 HC ACUTE/MED SURGE SEMI-PRIVATE ROOM

## 2017-12-11 PROCEDURE — 86920 COMPATIBILITY TEST SPIN: CPT

## 2017-12-11 PROCEDURE — 36430 TRANSFUSION BLD/BLD COMPNT: CPT | Mod: 59

## 2017-12-11 PROCEDURE — 85007 BL SMEAR W/DIFF WBC COUNT: CPT

## 2017-12-11 PROCEDURE — 85027 COMPLETE CBC AUTOMATED: CPT

## 2017-12-11 PROCEDURE — 99499 UNLISTED E&M SERVICE: CPT | Mod: ,,, | Performed by: EMERGENCY MEDICINE

## 2017-12-11 PROCEDURE — 99285 EMERGENCY DEPT VISIT HI MDM: CPT | Mod: 25

## 2017-12-11 PROCEDURE — 86900 BLOOD TYPING SEROLOGIC ABO: CPT

## 2017-12-11 PROCEDURE — 86850 RBC ANTIBODY SCREEN: CPT

## 2017-12-11 PROCEDURE — 96374 THER/PROPH/DIAG INJ IV PUSH: CPT

## 2017-12-11 PROCEDURE — 84550 ASSAY OF BLOOD/URIC ACID: CPT

## 2017-12-11 PROCEDURE — 80053 COMPREHEN METABOLIC PANEL: CPT

## 2017-12-11 PROCEDURE — 25000003 PHARM REV CODE 250: Performed by: STUDENT IN AN ORGANIZED HEALTH CARE EDUCATION/TRAINING PROGRAM

## 2017-12-11 RX ORDER — IBUPROFEN 200 MG
16 TABLET ORAL
Status: DISCONTINUED | OUTPATIENT
Start: 2017-12-11 | End: 2017-12-12 | Stop reason: HOSPADM

## 2017-12-11 RX ORDER — ENOXAPARIN SODIUM 100 MG/ML
40 INJECTION SUBCUTANEOUS EVERY 24 HOURS
Status: DISCONTINUED | OUTPATIENT
Start: 2017-12-11 | End: 2017-12-11

## 2017-12-11 RX ORDER — HYDROCODONE BITARTRATE AND ACETAMINOPHEN 500; 5 MG/1; MG/1
TABLET ORAL
Status: DISCONTINUED | OUTPATIENT
Start: 2017-12-11 | End: 2017-12-12

## 2017-12-11 RX ORDER — LEVOTHYROXINE SODIUM 25 UG/1
25 TABLET ORAL
Status: DISCONTINUED | OUTPATIENT
Start: 2017-12-12 | End: 2017-12-12 | Stop reason: HOSPADM

## 2017-12-11 RX ORDER — ONDANSETRON 4 MG/1
8 TABLET, FILM COATED ORAL EVERY 12 HOURS PRN
Status: DISCONTINUED | OUTPATIENT
Start: 2017-12-11 | End: 2017-12-12 | Stop reason: HOSPADM

## 2017-12-11 RX ORDER — SODIUM CHLORIDE 0.9 % (FLUSH) 0.9 %
5 SYRINGE (ML) INJECTION
Status: DISCONTINUED | OUTPATIENT
Start: 2017-12-11 | End: 2017-12-12 | Stop reason: HOSPADM

## 2017-12-11 RX ORDER — GLUCAGON 1 MG
1 KIT INJECTION
Status: DISCONTINUED | OUTPATIENT
Start: 2017-12-11 | End: 2017-12-12 | Stop reason: HOSPADM

## 2017-12-11 RX ORDER — IBUPROFEN 200 MG
24 TABLET ORAL
Status: DISCONTINUED | OUTPATIENT
Start: 2017-12-11 | End: 2017-12-12 | Stop reason: HOSPADM

## 2017-12-11 RX ORDER — ACYCLOVIR 200 MG/1
400 CAPSULE ORAL 2 TIMES DAILY
Status: DISCONTINUED | OUTPATIENT
Start: 2017-12-11 | End: 2017-12-12 | Stop reason: HOSPADM

## 2017-12-11 RX ORDER — ALLOPURINOL 100 MG/1
100 TABLET ORAL DAILY
Status: DISCONTINUED | OUTPATIENT
Start: 2017-12-12 | End: 2017-12-12 | Stop reason: HOSPADM

## 2017-12-11 RX ORDER — VALSARTAN 160 MG/1
160 TABLET ORAL DAILY
Status: DISCONTINUED | OUTPATIENT
Start: 2017-12-12 | End: 2017-12-12 | Stop reason: HOSPADM

## 2017-12-11 RX ORDER — LABETALOL HYDROCHLORIDE 5 MG/ML
20 INJECTION, SOLUTION INTRAVENOUS EVERY 4 HOURS PRN
Status: DISCONTINUED | OUTPATIENT
Start: 2017-12-11 | End: 2017-12-12 | Stop reason: HOSPADM

## 2017-12-11 RX ORDER — CIPROFLOXACIN 250 MG/1
500 TABLET, FILM COATED ORAL 2 TIMES DAILY
Status: DISCONTINUED | OUTPATIENT
Start: 2017-12-11 | End: 2017-12-12 | Stop reason: HOSPADM

## 2017-12-11 RX ORDER — VERAPAMIL HYDROCHLORIDE 240 MG/1
240 TABLET, FILM COATED, EXTENDED RELEASE ORAL DAILY
Status: DISCONTINUED | OUTPATIENT
Start: 2017-12-12 | End: 2017-12-12 | Stop reason: HOSPADM

## 2017-12-11 RX ADMIN — LABETALOL HYDROCHLORIDE 20 MG: 5 INJECTION, SOLUTION INTRAVENOUS at 11:12

## 2017-12-11 NOTE — TELEPHONE ENCOUNTER
----- Message from Chrissie Christine sent at 12/11/2017  2:55 PM CST -----  Contact: Pt  Pt calling stating that she needs to come in the office today. She is feeling very weak and feels like she needs a blood transfusion      Pt call back number [phone]  Spoke with pt at 315pm on 12/11/17, explained that today would be too late to bring her in for a blood transfusion because she would need labs also, instructed pt that if she is feeling that weak to go to the emergency room or she could see Dr. Wang  And have labs done on tomorrow morning, pt stated she would go to the ER today.  Hina

## 2017-12-12 VITALS
SYSTOLIC BLOOD PRESSURE: 164 MMHG | HEIGHT: 65 IN | DIASTOLIC BLOOD PRESSURE: 71 MMHG | RESPIRATION RATE: 20 BRPM | OXYGEN SATURATION: 94 % | HEART RATE: 70 BPM | WEIGHT: 125.13 LBS | BODY MASS INDEX: 20.85 KG/M2 | TEMPERATURE: 98 F

## 2017-12-12 PROBLEM — D61.818 PANCYTOPENIA: Status: ACTIVE | Noted: 2017-12-11

## 2017-12-12 LAB
ALBUMIN SERPL BCP-MCNC: 2.9 G/DL
ALP SERPL-CCNC: 103 U/L
ALT SERPL W/O P-5'-P-CCNC: 7 U/L
ANION GAP SERPL CALC-SCNC: 9 MMOL/L
ANISOCYTOSIS BLD QL SMEAR: SLIGHT
AST SERPL-CCNC: 16 U/L
BASOPHILS # BLD AUTO: 0 K/UL
BASOPHILS # BLD AUTO: 0 K/UL
BASOPHILS NFR BLD: 0 %
BASOPHILS NFR BLD: 0 %
BILIRUB SERPL-MCNC: 3.1 MG/DL
BLD PROD TYP BPU: NORMAL
BLOOD UNIT EXPIRATION DATE: NORMAL
BLOOD UNIT TYPE CODE: 5100
BLOOD UNIT TYPE: NORMAL
BUN SERPL-MCNC: 15 MG/DL
CALCIUM SERPL-MCNC: 9.4 MG/DL
CHLORIDE SERPL-SCNC: 109 MMOL/L
CO2 SERPL-SCNC: 24 MMOL/L
CODING SYSTEM: NORMAL
CREAT SERPL-MCNC: 1 MG/DL
DIFFERENTIAL METHOD: ABNORMAL
DIFFERENTIAL METHOD: ABNORMAL
DISPENSE STATUS: NORMAL
EOSINOPHIL # BLD AUTO: 0 K/UL
EOSINOPHIL # BLD AUTO: 0 K/UL
EOSINOPHIL NFR BLD: 0 %
EOSINOPHIL NFR BLD: 0 %
ERYTHROCYTE [DISTWIDTH] IN BLOOD BY AUTOMATED COUNT: 13.1 %
ERYTHROCYTE [DISTWIDTH] IN BLOOD BY AUTOMATED COUNT: 13.7 %
EST. GFR  (AFRICAN AMERICAN): >60 ML/MIN/1.73 M^2
EST. GFR  (NON AFRICAN AMERICAN): 53 ML/MIN/1.73 M^2
ESTIMATED AVG GLUCOSE: 111 MG/DL
GLUCOSE SERPL-MCNC: 85 MG/DL
HBA1C MFR BLD HPLC: 5.5 %
HCT VFR BLD AUTO: 21.2 %
HCT VFR BLD AUTO: 25.5 %
HGB BLD-MCNC: 7.1 G/DL
HGB BLD-MCNC: 8.7 G/DL
HYPOCHROMIA BLD QL SMEAR: ABNORMAL
IMM GRANULOCYTES # BLD AUTO: 0.01 K/UL
IMM GRANULOCYTES # BLD AUTO: 0.01 K/UL
IMM GRANULOCYTES NFR BLD AUTO: 1.1 %
IMM GRANULOCYTES NFR BLD AUTO: 2.1 %
LYMPHOCYTES # BLD AUTO: 0.4 K/UL
LYMPHOCYTES # BLD AUTO: 0.8 K/UL
LYMPHOCYTES NFR BLD: 75 %
LYMPHOCYTES NFR BLD: 89.4 %
MAGNESIUM SERPL-MCNC: 2 MG/DL
MCH RBC QN AUTO: 29 PG
MCH RBC QN AUTO: 29.7 PG
MCHC RBC AUTO-ENTMCNC: 33.5 G/DL
MCHC RBC AUTO-ENTMCNC: 34.1 G/DL
MCV RBC AUTO: 87 FL
MCV RBC AUTO: 87 FL
MONOCYTES # BLD AUTO: 0 K/UL
MONOCYTES # BLD AUTO: 0 K/UL
MONOCYTES NFR BLD: 0 %
MONOCYTES NFR BLD: 2.1 %
NEUTROPHILS # BLD AUTO: 0.1 K/UL
NEUTROPHILS # BLD AUTO: 0.1 K/UL
NEUTROPHILS NFR BLD: 20.8 %
NEUTROPHILS NFR BLD: 9.5 %
NRBC BLD-RTO: 0 /100 WBC
NRBC BLD-RTO: 0 /100 WBC
NUM UNITS TRANS PACKED RBC: NORMAL
NUM UNITS TRANS WBC-POOR PLATPHERESIS: NORMAL
PHOSPHATE SERPL-MCNC: 3.7 MG/DL
PLATELET # BLD AUTO: 41 K/UL
PLATELET # BLD AUTO: 44 K/UL
PLATELET BLD QL SMEAR: ABNORMAL
PMV BLD AUTO: 9.2 FL
PMV BLD AUTO: 9.6 FL
POTASSIUM SERPL-SCNC: 3.8 MMOL/L
PROT SERPL-MCNC: 7.7 G/DL
RBC # BLD AUTO: 2.45 M/UL
RBC # BLD AUTO: 2.93 M/UL
SODIUM SERPL-SCNC: 142 MMOL/L
WBC # BLD AUTO: 0.48 K/UL
WBC # BLD AUTO: 0.94 K/UL

## 2017-12-12 PROCEDURE — 25000003 PHARM REV CODE 250: Performed by: STUDENT IN AN ORGANIZED HEALTH CARE EDUCATION/TRAINING PROGRAM

## 2017-12-12 PROCEDURE — P9037 PLATE PHERES LEUKOREDU IRRAD: HCPCS

## 2017-12-12 PROCEDURE — 80053 COMPREHEN METABOLIC PANEL: CPT

## 2017-12-12 PROCEDURE — 84100 ASSAY OF PHOSPHORUS: CPT

## 2017-12-12 PROCEDURE — 83735 ASSAY OF MAGNESIUM: CPT

## 2017-12-12 PROCEDURE — 36430 TRANSFUSION BLD/BLD COMPNT: CPT

## 2017-12-12 PROCEDURE — 97165 OT EVAL LOW COMPLEX 30 MIN: CPT

## 2017-12-12 PROCEDURE — P9040 RBC LEUKOREDUCED IRRADIATED: HCPCS

## 2017-12-12 PROCEDURE — 36415 COLL VENOUS BLD VENIPUNCTURE: CPT

## 2017-12-12 PROCEDURE — 25000003 PHARM REV CODE 250: Performed by: INTERNAL MEDICINE

## 2017-12-12 PROCEDURE — 83036 HEMOGLOBIN GLYCOSYLATED A1C: CPT

## 2017-12-12 PROCEDURE — 85025 COMPLETE CBC W/AUTO DIFF WBC: CPT | Mod: 91

## 2017-12-12 PROCEDURE — 86644 CMV ANTIBODY: CPT

## 2017-12-12 PROCEDURE — 99222 1ST HOSP IP/OBS MODERATE 55: CPT | Mod: ,,, | Performed by: INTERNAL MEDICINE

## 2017-12-12 PROCEDURE — 63600175 PHARM REV CODE 636 W HCPCS: Performed by: STUDENT IN AN ORGANIZED HEALTH CARE EDUCATION/TRAINING PROGRAM

## 2017-12-12 RX ORDER — PREDNISONE 20 MG/1
40 TABLET ORAL DAILY
Status: DISCONTINUED | OUTPATIENT
Start: 2017-12-12 | End: 2017-12-12 | Stop reason: HOSPADM

## 2017-12-12 RX ORDER — HYDROCODONE BITARTRATE AND ACETAMINOPHEN 500; 5 MG/1; MG/1
TABLET ORAL
Status: DISCONTINUED | OUTPATIENT
Start: 2017-12-12 | End: 2017-12-12 | Stop reason: HOSPADM

## 2017-12-12 RX ORDER — PREDNISONE 20 MG/1
40 TABLET ORAL DAILY
Qty: 12 TABLET | Refills: 0 | Status: CANCELLED | OUTPATIENT
Start: 2017-12-12 | End: 2017-12-18

## 2017-12-12 RX ORDER — DIPHENHYDRAMINE HCL 25 MG
25 CAPSULE ORAL EVERY 6 HOURS PRN
Status: DISCONTINUED | OUTPATIENT
Start: 2017-12-12 | End: 2017-12-12 | Stop reason: HOSPADM

## 2017-12-12 RX ORDER — COLCHICINE 0.6 MG/1
0.6 TABLET ORAL DAILY
Qty: 10 TABLET | Refills: 0 | Status: SHIPPED | OUTPATIENT
Start: 2017-12-12 | End: 2018-03-26

## 2017-12-12 RX ORDER — PANTOPRAZOLE SODIUM 40 MG/1
40 TABLET, DELAYED RELEASE ORAL DAILY
Status: DISCONTINUED | OUTPATIENT
Start: 2017-12-12 | End: 2017-12-12 | Stop reason: HOSPADM

## 2017-12-12 RX ORDER — ACETAMINOPHEN 325 MG/1
650 TABLET ORAL EVERY 6 HOURS PRN
Status: DISCONTINUED | OUTPATIENT
Start: 2017-12-12 | End: 2017-12-12 | Stop reason: HOSPADM

## 2017-12-12 RX ADMIN — ACYCLOVIR 400 MG: 200 CAPSULE ORAL at 01:12

## 2017-12-12 RX ADMIN — DIPHENHYDRAMINE HYDROCHLORIDE 25 MG: 25 CAPSULE ORAL at 11:12

## 2017-12-12 RX ADMIN — ACYCLOVIR 400 MG: 200 CAPSULE ORAL at 09:12

## 2017-12-12 RX ADMIN — VALSARTAN 160 MG: 160 TABLET ORAL at 09:12

## 2017-12-12 RX ADMIN — ACETAMINOPHEN 650 MG: 325 TABLET ORAL at 01:12

## 2017-12-12 RX ADMIN — CIPROFLOXACIN HYDROCHLORIDE 500 MG: 250 TABLET, FILM COATED ORAL at 09:12

## 2017-12-12 RX ADMIN — ACETAMINOPHEN 650 MG: 325 TABLET ORAL at 11:12

## 2017-12-12 RX ADMIN — LEVOTHYROXINE SODIUM 25 MCG: 25 TABLET ORAL at 06:12

## 2017-12-12 RX ADMIN — DIPHENHYDRAMINE HYDROCHLORIDE 25 MG: 25 CAPSULE ORAL at 01:12

## 2017-12-12 RX ADMIN — VERAPAMIL HYDROCHLORIDE 240 MG: 240 TABLET, FILM COATED, EXTENDED RELEASE ORAL at 09:12

## 2017-12-12 RX ADMIN — PREDNISONE 40 MG: 20 TABLET ORAL at 11:12

## 2017-12-12 RX ADMIN — PANTOPRAZOLE SODIUM 40 MG: 40 TABLET, DELAYED RELEASE ORAL at 09:12

## 2017-12-12 RX ADMIN — ALLOPURINOL 100 MG: 100 TABLET ORAL at 09:12

## 2017-12-12 RX ADMIN — CIPROFLOXACIN HYDROCHLORIDE 500 MG: 250 TABLET, FILM COATED ORAL at 01:12

## 2017-12-12 NOTE — SUBJECTIVE & OBJECTIVE
Oncology Treatment Plan:   OP AZACITADINE 7-DAY (SUB-Q)    Medications:  Continuous Infusions:   Scheduled Meds:   acyclovir  400 mg Oral BID    allopurinol  100 mg Oral Daily    ciprofloxacin HCl  500 mg Oral BID    levothyroxine  25 mcg Oral Before breakfast    valsartan  160 mg Oral Daily    verapamil  240 mg Oral Daily     PRN Meds:sodium chloride, sodium chloride, sodium chloride, acetaminophen, dextrose 50%, dextrose 50%, diphenhydrAMINE, glucagon (human recombinant), glucose, glucose, labetalol, ondansetron, sodium chloride 0.9%     Review of patient's allergies indicates:   Allergen Reactions    Sulfa (sulfonamide antibiotics) Itching and Rash        Past Medical History:   Diagnosis Date    Allergy     Anemia     Arthritis     Cataract     CKD (chronic kidney disease)     Gout, unspecified     Hypertension     Hypothyroidism     MDS (myelodysplastic syndrome) 8/14/2017    Menopause     PNA (pneumonia) 10/15/2017    Sickle cell trait     Trigger finger      Past Surgical History:   Procedure Laterality Date    APPENDECTOMY      HEMORRHOID SURGERY      HYSTERECTOMY       Family History     Problem Relation (Age of Onset)    Cancer Son    Cataracts Father    Heart disease Father    Hypertension Mother    Peripheral vascular disease Father        Social History Main Topics    Smoking status: Former Smoker     Packs/day: 0.25     Years: 1.00     Quit date: 10/19/1972    Smokeless tobacco: Never Used    Alcohol use No    Drug use: No    Sexual activity: No       Review of Systems   Constitutional: Positive for fatigue.   Musculoskeletal: Positive for arthralgias.   Skin:        Petechia; Purpura   Neurological: Positive for weakness.     Objective:     Vital Signs (Most Recent):  Temp: 98 °F (36.7 °C) (12/12/17 0557)  Pulse: 76 (12/12/17 0557)  Resp: 18 (12/12/17 0557)  BP: (!) 169/77 (12/12/17 0557)  SpO2: 99 % (12/12/17 0557) Vital Signs (24h Range):  Temp:  [97.8 °F (36.6 °C)-98.6  °F (37 °C)] 98 °F (36.7 °C)  Pulse:  [70-99] 76  Resp:  [15-20] 18  SpO2:  [98 %-100 %] 99 %  BP: (145-206)/(64-83) 169/77     Weight: 56.8 kg (125 lb 1.8 oz)  Body mass index is 20.82 kg/m².  Body surface area is 1.61 meters squared.      Intake/Output Summary (Last 24 hours) at 12/12/17 0634  Last data filed at 12/12/17 0237   Gross per 24 hour   Intake              547 ml   Output                0 ml   Net              547 ml       Physical Exam   Constitutional: She is oriented to person, place, and time. She appears well-developed and well-nourished. No distress.   HENT:   Head: Normocephalic and atraumatic.   2 sores in R cheek buccal mucosa. 1 sore L cheek mucosa.   Eyes: EOM are normal. Pupils are equal, round, and reactive to light.   Neck: Normal range of motion. Neck supple.   Cardiovascular: Normal rate, regular rhythm, normal heart sounds and intact distal pulses.  Exam reveals no gallop and no friction rub.    No murmur heard.  Pulmonary/Chest: Effort normal and breath sounds normal. No respiratory distress. She exhibits no tenderness.   Abdominal: Soft. Bowel sounds are normal. She exhibits no distension. There is no tenderness.   Musculoskeletal: Normal range of motion. She exhibits edema and tenderness. She exhibits no deformity.   Truncal purpura; petechiae  R foot is swollen and diffusely tender; tenderness localized to forefoot/metartasals   Neurological: She is alert and oriented to person, place, and time. No cranial nerve deficit.   Skin: Skin is warm and dry. No rash noted. She is not diaphoretic. No erythema. No pallor.   Psychiatric: She has a normal mood and affect. Her behavior is normal. Judgment and thought content normal.   Nursing note and vitals reviewed.      Significant Labs:   Recent Lab Results       12/11/17 2024      Immature Granulocytes Test Not Performed  Comment:  Corrected result; previously reported as 0.0 on 12/11/2017 at 21:36.[C]     Immature Grans (Abs) Test Not  Performed  Comment:  Corrected result; previously reported as 0.00 on 12/11/2017 at 21:36.[C]     Unit Blood Type Code 5100[P]      5100[P]      5100[P]     Unit Expiration [id number][P]      [id number][P]      [id number][P]     Unit Blood Type O POS[P]      O POS[P]      O POS[P]     Albumin 3.1(L)     Alkaline Phosphatase 104     ALT 5(L)     Anion Gap 6(L)     Aniso Slight     AST 13     Baso # Test Not Performed  Comment:  Corrected result; previously reported as 0.00 on 12/11/2017 at 21:36.[C]     Basophil% 0.0     Total Bilirubin 0.9  Comment:  For infants and newborns, interpretation of results should be based  on gestational age, weight and in agreement with clinical  observations.  Premature Infant recommended reference ranges:  Up to 24 hours.............<8.0 mg/dL  Up to 48 hours............<12.0 mg/dL  3-5 days..................<15.0 mg/dL  6-29 days.................<15.0 mg/dL       BUN, Bld 15     Calcium 9.5     Chloride 110     CO2 27     CODING SYSTEM XCPF949[P]      HQZQ766[P]      NOYS164[P]     Creatinine 1.0     Differential Method Manual  Comment:  Corrected result; previously reported as automated on 12/11/2017 at   21:36.  [C]     DISPENSE STATUS ISSUED[P]      ISSUED[P]      ISSUED[P]     eGFR if African American >60.0     eGFR if non  53.0  Comment:  Calculation used to obtain the estimated glomerular filtration  rate (eGFR) is the CKD-EPI equation.   (A)     Eos # Test Not Performed  Comment:  Corrected result; previously reported as 0.0 on 12/11/2017 at 21:36.[C]     Eosinophil% 0.0  Comment:  Corrected result; previously reported as 0.8 on 12/11/2017 at 21:36.[C]     Glucose 97     Gran # Test Not Performed  Comment:  Corrected result; previously reported as 0.1 on 12/11/2017 at 21:36.[C]     Gran% 2.7  Comment:  Corrected result; previously reported as 6.2 on 12/11/2017 at 21:36.(L)[C]     Group & Rh O POS     Hematocrit 13.2  Comment:  HGB & HCT  critical  result(s) called and verbal readback obtained   from Guera Burger RN, 12/11/2017 21:19  (LL)     Hemoglobin 4.4  Comment:  HGB & HCT  critical result(s) called and verbal readback obtained   from Guera Burger RN, 12/11/2017 21:19  (LL)     Hypo Moderate     INDIRECT EDDIE NEG     Lymph # Test Not Performed  Comment:  Corrected result; previously reported as 1.2 on 12/11/2017 at 21:36.[C]     Lymph% 96.0  Comment:  Corrected result; previously reported as 92.2 on 12/11/2017 at 21:36.(H)[C]     MCH 27.8     MCHC 33.3     MCV 84     Mono # Test Not Performed  Comment:  Corrected result; previously reported as 0.0 on 12/11/2017 at 21:36.[C]     Mono% 1.3  Comment:  Corrected result; previously reported as 0.8 on 12/11/2017 at 21:36.(L)[C]     MPV SEE COMMENT  Comment:  Result not available.     nRBC 0     Ovalocytes Occasional     Platelet Estimate Decreased(A)     Platelets 2  Comment:  plt count    critical result(s) called and verbal readback obtained   from Guera Burger RN, 12/11/2017 21:36  (LL)     Poik Slight     Potassium 3.6     PRODUCT CODE D1050H13[P]      V7807G17[P]      U8490PD9[P]     Total Protein 8.1     RBC 1.58(L)     RDW 13.7     Sodium 143     Target Cells Occasional     UNIT NUMBER [id number][P]      [id number][P]      [id number][P]     Uric Acid 5.1     WBC 1.29  Comment:  wbc  critical result(s) called and verbal readback obtained from   Guera Burger RN, 12/11/2017 21:18  (LL)           Diagnostic Results:  None  R Foot Xrays: In process

## 2017-12-12 NOTE — ASSESSMENT & PLAN NOTE
- Pain is generalized to R fore foot, not a specific joint  - Uric acid WNL  - Obtain R foot Xrays  - Continue home regimen of allopurinol  - Start prednisone 40mg daily for 7-10 days; will avoid NSAIDs in this patient with CKD II

## 2017-12-12 NOTE — HPI
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 She has past medical history of sickle cell trait, hypothyroidism, HTN, gout, and CKD II. [place] [facility] with one week history of progressively worsening fatigue and generalized weakness. Initial evaluation notable for Hb 4.4, WBC 1.29, and Platelets 2k. She reports persistent chronic mouth sores which are presently well controlled with daily baking soda and salt oral rinses. She reports a 3 week history of R foot pain and swelling consistent with prior episodes of gout. She has not suffered an acute flair for 3 years. She takes daily allopurinol but otherwise does not follow a low purine diet. Otherwise denies complaints.      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

## 2017-12-12 NOTE — ED NOTES
Pt identifiers Shara Billcklaurita and correct  LOC: The patient is awake, alert, aware of environment with an appropriate affect. Oriented x3, speaking appropriately  APPEARANCE: Pt resting comfortably, in no acute distress, pt is clean and well groomed, clothing properly fastened  SKIN: right medial aspect of foot pain with dark skin coloring  RESPIRATORY: Airway is open and patent, respirations are spontaneous, even and unlabored, normal effort and rate  CARDIAC: Normal rate and rhythm, no peripheral edema noted, capillary refill < 3 seconds, bilateral radial pulses 2+  ABDOMEN: Soft, nontender, nondistended. Bowel sounds present   NEUROLOGIC: PERRL, facial expression is symmetrical, patient moving all extremities spontaneously, normal sensation in all extremities when touched with a finger.  Follows all commands appropriately. +generalized weakness and fatigue  MUSCULOSKELETAL: No obvious deformities.

## 2017-12-12 NOTE — ED PROVIDER NOTES
Encounter Date: 12/11/2017    SCRIBE #1 NOTE: I, Janina Mazariegos, am scribing for, and in the presence of,  Dr. Lopez. I have scribed the entire note.       History     Chief Complaint   Patient presents with    multiple complaints     foot still swollen, told was gout few weeks ago, both legs weak, feel like needing blood pr platelet transfusion    Fatigue     last chemo dec 5, mask applied     Time seen by provider: 8:00 PM    This is a 82 y.o. female with PM Hx of anemia, HTN, gout, CKD, and multiple myeloma on chemotherapy who presents with complaint of fatigue and generalized weakness for the past 5-6 days. Similar symptoms in the past with need for blood and platelet transfusions. She further endorses sores in her mouth (for which she gargles baking soda and salt), right foot swelling and pain, and purpura on trunk and extremities. Pt denies fever, chills, CP, dyspnea, N/V/D, abdominal pain, difficulty urinating, dysuria, or any other symptoms at this time.       The history is provided by the patient and medical records.     Review of patient's allergies indicates:   Allergen Reactions    Sulfa (sulfonamide antibiotics) Itching and Rash     Past Medical History:   Diagnosis Date    Allergy     Anemia     Arthritis     Cataract     CKD (chronic kidney disease)     Gout, unspecified     Hypertension     Hypothyroidism     MDS (myelodysplastic syndrome) 8/14/2017    Menopause     PNA (pneumonia) 10/15/2017    Sickle cell trait     Trigger finger      Past Surgical History:   Procedure Laterality Date    APPENDECTOMY      HEMORRHOID SURGERY      HYSTERECTOMY       Family History   Problem Relation Age of Onset    Hypertension Mother     Peripheral vascular disease Father     Heart disease Father     Cataracts Father     Cancer Son     Diabetes Neg Hx     Amblyopia Neg Hx     Blindness Neg Hx     Glaucoma Neg Hx     Macular degeneration Neg Hx     Retinal detachment Neg Hx      Strabismus Neg Hx      Social History   Substance Use Topics    Smoking status: Former Smoker     Packs/day: 0.25     Years: 1.00     Quit date: 10/19/1972    Smokeless tobacco: Never Used    Alcohol use No     Review of Systems   Constitutional: Positive for fatigue. Negative for chills and fever.   HENT: Positive for mouth sores.    Eyes: Negative for visual disturbance.   Respiratory: Negative for shortness of breath.    Cardiovascular: Negative for chest pain.   Gastrointestinal: Negative for abdominal pain, diarrhea and nausea.   Genitourinary: Negative for difficulty urinating and dysuria.   Musculoskeletal:        Positive for right foot swelling and pain.    Skin: Positive for rash (purpura on trunk and extremities ).   Neurological: Positive for weakness.       Physical Exam     Initial Vitals [12/11/17 1610]   BP Pulse Resp Temp SpO2   (!) 145/64 82 18 98.6 °F (37 °C) 100 %      MAP       91         Physical Exam    Nursing note and vitals reviewed.  Constitutional: She appears well-developed and well-nourished. No distress.   HENT:   Head: Normocephalic and atraumatic.   Right Ear: Tympanic membrane normal.   Left Ear: Tympanic membrane normal.   Mouth/Throat: Uvula is midline and oropharynx is clear and moist. Mucous membranes are pale.   Purpura spots on right mucosal wall   Eyes: EOM are normal. Pupils are equal, round, and reactive to light.   Conjunctival pallor    Neck: Normal range of motion. Neck supple. No thyroid mass and no thyromegaly present. No muscular tenderness present. Carotid bruit is not present (no bruit or thrill).   Cardiovascular: Normal rate, regular rhythm, S1 normal, S2 normal and normal heart sounds. Exam reveals no gallop, no S3, no S4 and no friction rub.    No murmur heard.  Pulses:       Carotid pulses are 2+ on the right side, and 2+ on the left side.       Dorsalis pedis pulses are 2+ on the right side, and 2+ on the left side.        Posterior tibial pulses are 2+ on  the right side, and 2+ on the left side.   Pulmonary/Chest: Breath sounds normal. No respiratory distress. She has no wheezes. She has no rhonchi. She has no rales.   Abdominal: Soft. Bowel sounds are normal. She exhibits no mass. There is no hepatosplenomegaly. There is no rebound, no guarding, no tenderness at McBurney's point and negative Ruiz's sign.   Musculoskeletal: Normal range of motion. She exhibits no tenderness.   Some edema to the right foot and hyperpigmentation changes. Mild swelling and TTP of the first metacarpophalangeal joint (symptoms similar to diagnosis of gout in the past). Remaining extremities with no clubbing, cyanosis, edema, or deformities.    Neurological: She is alert and oriented to person, place, and time. She has normal strength and normal reflexes. No cranial nerve deficit or sensory deficit. Gait normal.   Normal cerebellar function.    Skin: Skin is warm and dry. No cyanosis. There is pallor (slightly). Nails show no clubbing.   Normal turgor. Scattered purpuric lesions to trunk and extremities.          ED Course   Procedures  Labs Reviewed   CBC W/ AUTO DIFFERENTIAL   COMPREHENSIVE METABOLIC PANEL   URIC ACID   TYPE & SCREEN             Medical Decision Making:   History:   Old Medical Records: I decided to obtain old medical records.  Clinical Tests:   Lab Tests: Ordered and Reviewed            Scribe Attestation:   Scribe #1: I performed the above scribed service and the documentation accurately describes the services I performed. I attest to the accuracy of the note.    I, Dr. Boyd Lopez, personally performed the services described in this documentation. All medical record entries made by the scribe were at my direction and in my presence.  I have reviewed the chart and agree that the record reflects my personal performance and is accurate and complete. Boyd Lopez MD.  8:16 PM 12/11/2017        ED Course      Clinical Impression:   There were no encounter diagnoses.                            Boyd Lopez MD  12/11/17 2016       Boyd Lopez MD  12/11/17 2016

## 2017-12-12 NOTE — H&P
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 She has past medical history of sickle cell trait, hypothyroidism, HTN, gout, and CKD II. She presents to [facility] ED with one week history of progressively worsening fatigue and generalized weakness. Initial evaluation notable for Hb 4.4, WBC 1.29, and Platelets 2k. She reports persistent chronic mouth sores which are presently well controlled with daily baking soda and salt oral rinses. She reports a 3 week history of R foot pain and swelling consistent with prior episodes of gout. She has not suffered an acute flair for 3 years. She takes daily allopurinol but otherwise does not follow a low purine diet. Otherwise denies complaints.      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 bone marrow biopsy ; Int 2 IPSS; Very high risk R-IPSS  -not a stem cell transplant candidate due to age/comorbidities     Oncology Treatment Plan:   OP AZACITADINE 7-DAY (SUB-Q)    Medications:  Continuous Infusions:   Scheduled Meds:   acyclovir  400 mg Oral BID    allopurinol  100 mg Oral Daily    ciprofloxacin HCl  500 mg Oral BID    levothyroxine  25 mcg Oral Before breakfast    valsartan  160 mg Oral Daily    verapamil  240 mg Oral Daily     PRN Meds:sodium chloride, sodium chloride, sodium chloride, acetaminophen, dextrose 50%, dextrose 50%, diphenhydrAMINE, glucagon (human recombinant), glucose, glucose, labetalol, ondansetron, sodium chloride 0.9%     Review of patient's allergies indicates:   Allergen Reactions    Sulfa (sulfonamide  antibiotics) Itching and Rash        Past Medical History:   Diagnosis Date    Allergy     Anemia     Arthritis     Cataract     CKD (chronic kidney disease)     Gout, unspecified     Hypertension     Hypothyroidism     MDS (myelodysplastic syndrome) 8/14/2017    Menopause     PNA (pneumonia) 10/15/2017    Sickle cell trait     Trigger finger      Past Surgical History:   Procedure Laterality Date    APPENDECTOMY      HEMORRHOID SURGERY      HYSTERECTOMY       Family History     Problem Relation (Age of Onset)    Cancer Son    Cataracts Father    Heart disease Father    Hypertension Mother    Peripheral vascular disease Father        Social History Main Topics    Smoking status: Former Smoker     Packs/day: 0.25     Years: 1.00     Quit date: 10/19/1972    Smokeless tobacco: Never Used    Alcohol use No    Drug use: No    Sexual activity: No       Review of Systems   Constitutional: Positive for fatigue.   Musculoskeletal: Positive for arthralgias.   Skin:        Petechia; Purpura   Neurological: Positive for weakness.     Objective:     Vital Signs (Most Recent):  Temp: 98 °F (36.7 °C) (12/12/17 0557)  Pulse: 76 (12/12/17 0557)  Resp: 18 (12/12/17 0557)  BP: (!) 169/77 (12/12/17 0557)  SpO2: 99 % (12/12/17 0557) Vital Signs (24h Range):  Temp:  [97.8 °F (36.6 °C)-98.6 °F (37 °C)] 98 °F (36.7 °C)  Pulse:  [70-99] 76  Resp:  [15-20] 18  SpO2:  [98 %-100 %] 99 %  BP: (145-206)/(64-83) 169/77     Weight: 56.8 kg (125 lb 1.8 oz)  Body mass index is 20.82 kg/m².  Body surface area is 1.61 meters squared.      Intake/Output Summary (Last 24 hours) at 12/12/17 0624  Last data filed at 12/12/17 0237   Gross per 24 hour   Intake              547 ml   Output                0 ml   Net              547 ml       Physical Exam   Constitutional: She is oriented to person, place, and time. She appears well-developed and well-nourished. No distress.   HENT:   Head: Normocephalic and atraumatic.   2 sores in R  cheek buccal mucosa. 1 sore L cheek mucosa.   Eyes: EOM are normal. Pupils are equal, round, and reactive to light.   Neck: Normal range of motion. Neck supple.   Cardiovascular: Normal rate, regular rhythm, normal heart sounds and intact distal pulses.  Exam reveals no gallop and no friction rub.    No murmur heard.  Pulmonary/Chest: Effort normal and breath sounds normal. No respiratory distress. She exhibits no tenderness.   Abdominal: Soft. Bowel sounds are normal. She exhibits no distension. There is no tenderness.   Musculoskeletal: Normal range of motion. She exhibits edema and tenderness. She exhibits no deformity.   Truncal purpura; petechiae  R foot is swollen and diffusely tender; tenderness localized to forefoot/metartasals   Neurological: She is alert and oriented to person, place, and time. No cranial nerve deficit.   Skin: Skin is warm and dry. No rash noted. She is not diaphoretic. No erythema. No pallor.   Psychiatric: She has a normal mood and affect. Her behavior is normal. Judgment and thought content normal.   Nursing note and vitals reviewed.      Significant Labs:   Recent Lab Results       12/11/17 2024      Immature Granulocytes Test Not Performed  Comment:  Corrected result; previously reported as 0.0 on 12/11/2017 at 21:36.[C]     Immature Grans (Abs) Test Not Performed  Comment:  Corrected result; previously reported as 0.00 on 12/11/2017 at 21:36.[C]     Unit Blood Type Code 5100[P]      5100[P]      5100[P]     Unit Expiration [id number][P]      [id number][P]      [id number][P]     Unit Blood Type O POS[P]      O POS[P]      O POS[P]     Albumin 3.1(L)     Alkaline Phosphatase 104     ALT 5(L)     Anion Gap 6(L)     Aniso Slight     AST 13     Baso # Test Not Performed  Comment:  Corrected result; previously reported as 0.00 on 12/11/2017 at 21:36.[C]     Basophil% 0.0     Total Bilirubin 0.9  Comment:  For infants and newborns, interpretation of results should be based  on  gestational age, weight and in agreement with clinical  observations.  Premature Infant recommended reference ranges:  Up to 24 hours.............<8.0 mg/dL  Up to 48 hours............<12.0 mg/dL  3-5 days..................<15.0 mg/dL  6-29 days.................<15.0 mg/dL       BUN, Bld 15     Calcium 9.5     Chloride 110     CO2 27     CODING SYSTEM LNKP533[P]      UIKX179[P]      TLME704[P]     Creatinine 1.0     Differential Method Manual  Comment:  Corrected result; previously reported as automated on 12/11/2017 at   21:36.  [C]     DISPENSE STATUS ISSUED[P]      ISSUED[P]      ISSUED[P]     eGFR if African American >60.0     eGFR if non  53.0  Comment:  Calculation used to obtain the estimated glomerular filtration  rate (eGFR) is the CKD-EPI equation.   (A)     Eos # Test Not Performed  Comment:  Corrected result; previously reported as 0.0 on 12/11/2017 at 21:36.[C]     Eosinophil% 0.0  Comment:  Corrected result; previously reported as 0.8 on 12/11/2017 at 21:36.[C]     Glucose 97     Gran # Test Not Performed  Comment:  Corrected result; previously reported as 0.1 on 12/11/2017 at 21:36.[C]     Gran% 2.7  Comment:  Corrected result; previously reported as 6.2 on 12/11/2017 at 21:36.(L)[C]     Group & Rh O POS     Hematocrit 13.2  Comment:  HGB & HCT  critical result(s) called and verbal readback obtained   from Guera Burger RN, 12/11/2017 21:19  (LL)     Hemoglobin 4.4  Comment:  HGB & HCT  critical result(s) called and verbal readback obtained   from Guera Burger RN, 12/11/2017 21:19  (LL)     Hypo Moderate     INDIRECT EDDIE NEG     Lymph # Test Not Performed  Comment:  Corrected result; previously reported as 1.2 on 12/11/2017 at 21:36.[C]     Lymph% 96.0  Comment:  Corrected result; previously reported as 92.2 on 12/11/2017 at 21:36.(H)[C]     MCH 27.8     MCHC 33.3     MCV 84     Mono # Test Not Performed  Comment:  Corrected result; previously reported as 0.0 on 12/11/2017 at  21:36.[C]     Mono% 1.3  Comment:  Corrected result; previously reported as 0.8 on 12/11/2017 at 21:36.(L)[C]     MPV SEE COMMENT  Comment:  Result not available.     nRBC 0     Ovalocytes Occasional     Platelet Estimate Decreased(A)     Platelets 2  Comment:  plt count    critical result(s) called and verbal readback obtained   from Guera Burger RN, 12/11/2017 21:36  (LL)     Poik Slight     Potassium 3.6     PRODUCT CODE O0145N26[P]      M4905I49[P]      I3751WE8[P]     Total Protein 8.1     RBC 1.58(L)     RDW 13.7     Sodium 143     Target Cells Occasional     UNIT NUMBER [id number][P]      [id number][P]      [id number][P]     Uric Acid 5.1     WBC 1.29  Comment:  wbc  critical result(s) called and verbal readback obtained from   Guera Burger RN, 12/11/2017 21:18  (LL)           Diagnostic Results:  None  R Foot Xrays: In process    Assessment/Plan:     Pancytopenia    - Secondary to MDS  - 12/12/17 Transfused 2u pRBC; 1u Plt  - Transfuse to maintain Hb>7; Plt >10        Acute gout involving toe of right foot    - Pain is generalized to R fore foot, not a specific joint  - Uric acid WNL  - Obtain R foot Xrays  - Continue home regimen of allopurinol  - Start prednisone 40mg daily for 7-10 days; will avoid NSAIDs in this patient with CKD II        MDS (myelodysplastic syndrome)    - MDS with multilineage dysplasia  - Diagnosed on 8/7/2017 bone marrow biopsy ; Int 2 IPSS; Very high risk R-IPSS  - Not a stem cell transplant candidate due to age/comorbidities  - s/p 4 cycles of Vidaza  - Continue cipro, acyclovir prophylaxis        Hypothyroidism    - Continue levothyroxine        Hypertension    - Continue valsartan, verapamil            Mojgan Boyle MD  Hematology/Oncology  [facility]

## 2017-12-12 NOTE — PROGRESS NOTES
Admit note: Pt arrived via [facility] from ED. AAOx4. . V/s stable. Pt placed safely in bed. Pt oriented to room and call light. Safety precautions maintained. Bed is in low and locked position with side rails up x 2. Call bell is within reach of pt.      Skin note: Pt skin bruised and dry but no pressure ulcers noted.

## 2017-12-12 NOTE — NURSING
Patient order discharge to home . Medication AVS reviewed with patient . Medication sent to local pharmacy. Family will transport patient home . Patient will leave hospital via [place]. VSS stable at time of discharge no complaints of discomfort of any kind .

## 2017-12-12 NOTE — PLAN OF CARE
Problem: Occupational Therapy Goal  Goal: Occupational Therapy Goal  is currently performing ADLs, functional mobility & t/fs at baseline and displays age-appropriate strength, endurance & balance. OT services are not recommended at this time and patient is safe to D/C home.    D/C acute OT services     Comments: Moises Rosario OTR/L  12/12/2017

## 2017-12-12 NOTE — DISCHARGE SUMMARY
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 She has past medical history of sickle cell trait, hypothyroidism, HTN, gout, and CKD II. She presents to [facility] ED with one week history of progressively worsening fatigue and generalized weakness. Initial evaluation notable for Hb 4.4, WBC 1.29, and Platelets 2k. She reports persistent chronic mouth sores which are presently well controlled with daily baking soda and salt oral rinses. She reports a 3 week history of R foot pain and swelling consistent with prior episodes of gout. She has not suffered an acute flair for 3 years. She takes daily allopurinol but otherwise does not follow a low purine diet. Otherwise denies complaints.    Hospital Course: After admission Ms. Rose was transfused 3U pRBCs and 1U Plt, with appropriate response. Her foot pain appeared consistent with gout clinically; X-ray demonstrated soft tissue swelling without underlying abnormality. After post-transfusion CBC demonstrated response, she was prepared for discharge, to complete a 10-day course of colchicine 0.6mg PO daily for her foot discomfort.    Consults         Status Ordering Provider     Inpatient consult to Hematology/Oncology  Once     Provider:  (Not yet assigned)    BELINDA Henao        Significant Diagnostic Studies:   Recent Results (from the past 72 hour(s))   CBC auto differential    Collection Time: 12/11/17  8:24 PM   Result Value Ref Range    WBC 1.29 (LL) 3.90 - 12.70 K/uL    RBC 1.58 (L) 4.00 - 5.40  M/uL    Hemoglobin 4.4 (LL) 12.0 - 16.0 g/dL    Hematocrit 13.2 (LL) 37.0 - 48.5 %    MCV 84 82 - 98 fL    MCH 27.8 27.0 - 31.0 pg    MCHC 33.3 32.0 - 36.0 g/dL    RDW 13.7 11.5 - 14.5 %    Platelets 2 (LL) 150 - 350 K/uL    MPV SEE COMMENT 9.2 - 12.9 fL    Immature Granulocytes Test Not Performed 0.0 - 0.5 %    Gran # Test Not Performed 1.8 - 7.7 K/uL    Immature Grans (Abs) Test Not Performed 0.00 - 0.04 K/uL    Lymph # Test Not Performed 1.0 - 4.8 K/uL    Mono # Test Not Performed 0.3 - 1.0 K/uL    Eos # Test Not Performed 0.0 - 0.5 K/uL    Baso # Test Not Performed 0.00 - 0.20 K/uL    nRBC 0 0 /100 WBC    Gran% 2.7 (L) 38.0 - 73.0 %    Lymph% 96.0 (H) 18.0 - 48.0 %    Mono% 1.3 (L) 4.0 - 15.0 %    Eosinophil% 0.0 0.0 - 8.0 %    Basophil% 0.0 0.0 - 1.9 %    Platelet Estimate Decreased (A)     Aniso Slight     Poik Slight     Hypo Moderate     Ovalocytes Occasional     Target Cells Occasional     Differential Method Manual    Comprehensive metabolic panel    Collection Time: 12/11/17  8:24 PM   Result Value Ref Range    Sodium 143 136 - 145 mmol/L    Potassium 3.6 3.5 - 5.1 mmol/L    Chloride 110 95 - 110 mmol/L    CO2 27 23 - 29 mmol/L    Glucose 97 70 - 110 mg/dL    BUN, Bld 15 8 - 23 mg/dL    Creatinine 1.0 0.5 - 1.4 mg/dL    Calcium 9.5 8.7 - 10.5 mg/dL    Total Protein 8.1 6.0 - 8.4 g/dL    Albumin 3.1 (L) 3.5 - 5.2 g/dL    Total Bilirubin 0.9 0.1 - 1.0 mg/dL    Alkaline Phosphatase 104 55 - 135 U/L    AST 13 10 - 40 U/L    ALT 5 (L) 10 - 44 U/L    Anion Gap 6 (L) 8 - 16 mmol/L    eGFR if African American >60.0 >60 mL/min/1.73 m^2    eGFR if non  53.0 (A) >60 mL/min/1.73 m^2   Uric acid    Collection Time: 12/11/17  8:24 PM   Result Value Ref Range    Uric Acid 5.1 2.4 - 5.7 mg/dL   Type & Screen    Collection Time: 12/11/17  8:24 PM   Result Value Ref Range    Group & Rh O POS     Indirect Mikie NEG    Prepare RBC 2 Units; emergency    Collection Time: 12/11/17  8:24 PM   Result Value Ref  Range    UNIT NUMBER [id number]     PRODUCT CODE D9628E80     DISPENSE STATUS TRANSFUSED     CODING SYSTEM AHSF953     Unit Blood Type Code 5100     Unit Blood Type O POS     Unit Expiration [id number]     UNIT NUMBER [id number]     PRODUCT CODE N9858K16     DISPENSE STATUS TRANSFUSED     CODING SYSTEM BBXM606     Unit Blood Type Code 5100     Unit Blood Type O POS     Unit Expiration [id number]    Prepare Platelets 1 Dose    Collection Time: 12/11/17  8:24 PM   Result Value Ref Range    UNIT NUMBER [id number]     PRODUCT CODE R4866VP1     DISPENSE STATUS TRANSFUSED     CODING SYSTEM SFLY531     Unit Blood Type Code 5100     Unit Blood Type O POS     Unit Expiration [id number]    Prepare RBC 1 Unit    Collection Time: 12/11/17  8:24 PM   Result Value Ref Range    UNIT NUMBER [id number]     PRODUCT CODE C0209G79     DISPENSE STATUS TRANSFUSED     CODING SYSTEM HLYC745     Unit Blood Type Code 5100     Unit Blood Type O POS     Unit Expiration [id number]    Hemoglobin A1c    Collection Time: 12/12/17 10:29 AM   Result Value Ref Range    Hemoglobin A1C 5.5 4.0 - 5.6 %    Estimated Avg Glucose 111 68 - 131 mg/dL   CBC with Automated Differential    Collection Time: 12/12/17 10:29 AM   Result Value Ref Range    WBC 0.94 (LL) 3.90 - 12.70 K/uL    RBC 2.45 (L) 4.00 - 5.40 M/uL    Hemoglobin 7.1 (L) 12.0 - 16.0 g/dL    Hematocrit 21.2 (L) 37.0 - 48.5 %    MCV 87 82 - 98 fL    MCH 29.0 27.0 - 31.0 pg    MCHC 33.5 32.0 - 36.0 g/dL    RDW 13.1 11.5 - 14.5 %    Platelets 44 (L) 150 - 350 K/uL    MPV 9.6 9.2 - 12.9 fL    Immature Granulocytes 1.1 (H) 0.0 - 0.5 %    Gran # 0.1 (L) 1.8 - 7.7 K/uL    Immature Grans (Abs) 0.01 0.00 - 0.04 K/uL    Lymph # 0.8 (L) 1.0 - 4.8 K/uL    Mono # 0.0 (L) 0.3 - 1.0 K/uL    Eos # 0.0 0.0 - 0.5 K/uL    Baso # 0.00 0.00 - 0.20 K/uL    nRBC 0 0 /100 WBC    Gran% 9.5 (L) 38.0 - 73.0 %    Lymph% 89.4 (H) 18.0 - 48.0 %    Mono% 0.0 (L) 4.0 - 15.0 %    Eosinophil% 0.0 0.0 - 8.0 %     Basophil% 0.0 0.0 - 1.9 %    Differential Method Automated    Comprehensive Metabolic Panel (CMP)    Collection Time: 12/12/17 10:29 AM   Result Value Ref Range    Sodium 142 136 - 145 mmol/L    Potassium 3.8 3.5 - 5.1 mmol/L    Chloride 109 95 - 110 mmol/L    CO2 24 23 - 29 mmol/L    Glucose 85 70 - 110 mg/dL    BUN, Bld 15 8 - 23 mg/dL    Creatinine 1.0 0.5 - 1.4 mg/dL    Calcium 9.4 8.7 - 10.5 mg/dL    Total Protein 7.7 6.0 - 8.4 g/dL    Albumin 2.9 (L) 3.5 - 5.2 g/dL    Total Bilirubin 3.1 (H) 0.1 - 1.0 mg/dL    Alkaline Phosphatase 103 55 - 135 U/L    AST 16 10 - 40 U/L    ALT 7 (L) 10 - 44 U/L    Anion Gap 9 8 - 16 mmol/L    eGFR if African American >60.0 >60 mL/min/1.73 m^2    eGFR if non  53.0 (A) >60 mL/min/1.73 m^2   Magnesium    Collection Time: 12/12/17 10:29 AM   Result Value Ref Range    Magnesium 2.0 1.6 - 2.6 mg/dL   Phosphorus    Collection Time: 12/12/17 10:29 AM   Result Value Ref Range    Phosphorus 3.7 2.7 - 4.5 mg/dL   CBC auto differential    Collection Time: 12/12/17  5:05 PM   Result Value Ref Range    WBC 0.48 (LL) 3.90 - 12.70 K/uL    RBC 2.93 (L) 4.00 - 5.40 M/uL    Hemoglobin 8.7 (L) 12.0 - 16.0 g/dL    Hematocrit 25.5 (L) 37.0 - 48.5 %    MCV 87 82 - 98 fL    MCH 29.7 27.0 - 31.0 pg    MCHC 34.1 32.0 - 36.0 g/dL    RDW 13.7 11.5 - 14.5 %    Platelets 41 (L) 150 - 350 K/uL    MPV 9.2 9.2 - 12.9 fL    Immature Granulocytes 2.1 (H) 0.0 - 0.5 %    Gran # 0.1 (L) 1.8 - 7.7 K/uL    Immature Grans (Abs) 0.01 0.00 - 0.04 K/uL    Lymph # 0.4 (L) 1.0 - 4.8 K/uL    Mono # 0.0 (L) 0.3 - 1.0 K/uL    Eos # 0.0 0.0 - 0.5 K/uL    Baso # 0.00 0.00 - 0.20 K/uL    nRBC 0 0 /100 WBC    Gran% 20.8 (L) 38.0 - 73.0 %    Lymph% 75.0 (H) 18.0 - 48.0 %    Mono% 2.1 (L) 4.0 - 15.0 %    Eosinophil% 0.0 0.0 - 8.0 %    Basophil% 0.0 0.0 - 1.9 %    Platelet Estimate Decreased (A)     Aniso Slight     Hypo Occasional     Differential Method Automated      Pending Diagnostic Studies:     Procedure  Component Value Units Date/Time    CBC auto differential [[id number]] Collected:  12/12/17 1705    Order Status:  Sent Lab Status:  In process Updated:  12/12/17 1709    Specimen:  Blood from Blood     Narrative:       Collection has been rescheduled by TRM at 12/12/2017 08:56 Reason:   patient still receiving blood. per sara faria   Collection has been rescheduled by TRM at 12/12/2017 10:32 Reason: sara faria to retime due to patient receiving unit of blood soon   Collection has been rescheduled by TRM at 12/12/2017 12:05 Reason:   per sara faria please draw labs at 1600. blood transfusion to be   finished around 3 pm        Final Active Diagnoses:    Diagnosis Date Noted POA    PRINCIPAL PROBLEM:  Pancytopenia [D61.818] 12/11/2017 Yes    Acute gout involving toe of right foot [M10.9] 11/14/2017 Yes    MDS (myelodysplastic syndrome) [D46.9] 08/14/2017 Yes    Hypothyroidism [E03.9]  Yes    Hypertension [I10]  Yes      Problems Resolved During this Admission:    Diagnosis Date Noted Date Resolved POA      Discharged Condition: fair    Disposition: Home or Self Care    Follow Up:  Follow-up Information     [facility] On 12/15/2017.    Specialty:  Lab  Why:  Labs- 10:00am followed by possible blood   Contact information:  [address] [place] [id number] [phone]  Additional information:  [facility] 3rd Floor               Patient Instructions:     Diet general     Activity as tolerated     Call MD for:  temperature >100.4     Call MD for:  persistent nausea and vomiting or diarrhea     Call MD for:  difficulty breathing or increased cough       Medications:  Reconciled Home Medications:   Discharge Medication List as of 12/12/2017  5:48 PM      START taking these medications    Details   colchicine 0.6 mg tablet Take 1 tablet (0.6 mg total) by mouth once daily., Starting Tue 12/12/2017, Until Fri 12/22/2017, Normal         CONTINUE these medications which have  NOT CHANGED    Details   acyclovir (ZOVIRAX) 200 MG capsule Take 2 capsules (400 mg total) by mouth 2 (two) times daily., Starting Thu 10/19/2017, Until Fri 10/19/2018, Normal      allopurinol (ZYLOPRIM) 100 MG tablet Take 1 tablet (100 mg total) by mouth once daily. For gout, Starting Fri 11/17/2017, Normal      AZACITIDINE (VIDAZA INJ) Inject as directed., Historical Med      ciprofloxacin HCl (CIPRO) 500 MG tablet Take 1 tablet (500 mg total) by mouth 2 (two) times daily., Starting Tue 10/24/2017, Normal      clotrimazole-betamethasone 1-0.05% (LOTRISONE) cream Apply topically 2 (two) times daily., Starting 11/16/2015, Until Discontinued, Normal      levothyroxine (SYNTHROID) 25 MCG tablet TAKE 1 TABLET EVERY DAY, Normal      meclizine (ANTIVERT) 25 mg tablet TK 1 T PO BID FOR 10 DAYS PRF DIZZINESS, Historical Med      ondansetron (ZOFRAN) 8 MG tablet Take 1 tablet (8 mg total) by mouth every 12 (twelve) hours as needed for Nausea., Starting Tue 11/7/2017, Until Wed 11/7/2018, Normal      pantoprazole (PROTONIX) 40 MG tablet Take 1 tablet (40 mg total) by mouth once daily. While on prednisone, Starting Fri 11/17/2017, Until Sat 11/17/2018, Normal      valsartan (DIOVAN) 160 MG tablet Take 1 tablet (160 mg total) by mouth once daily., Starting Thu 11/16/2017, Normal      verapamil (CALAN-SR) 240 MG CR tablet TAKE 1 TABLET EVERY DAY, Normal         STOP taking these medications       predniSONE (DELTASONE) 5 MG tablet Comments:   Reason for Stopping:               ANGEL Soni MD  Bone Marrow Transplant  [facility]

## 2017-12-12 NOTE — ASSESSMENT & PLAN NOTE
- MDS with multilineage dysplasia  - Diagnosed on 8/7/2017 bone marrow biopsy ; Int 2 IPSS; Very high risk R-IPSS  - Not a stem cell transplant candidate due to age/comorbidities  - s/p 4 cycles of Vidaza  - Continue cipro, acyclovir prophylaxis

## 2017-12-12 NOTE — PROGRESS NOTES
1 dose platelets and 2 units prbc transfused this morning per MD order for plt=2 and H&H 4,4/13.2. Blood consent in chart. Pt premedicated with tylenol and benadryl prior to start of transfusion. Platelets and blood checked against order and patient at bedside by 2 RNs per protocol. Pt tolerated well; no distress noted; all VSS. Will continue to monitor throughout shift.

## 2017-12-12 NOTE — PT/OT/SLP EVAL
Occupational Therapy   Evaluation/Discharge     Name: Shara Rose  MRN: [id number]  Admitting Diagnosis:  <principal problem not specified>      Recommendations:     Discharge Recommendations: home  Discharge Equipment Recommendations:  none  Barriers to discharge:  None    History:     Occupational Profile:  Living Environment: Pt lives w/ spouse in a h  Previous level of function: Indep  Roles and Routines: caregiver   Equipment Owned:  none  Assistance upon Discharge: pt's granddaughters can assist upon D/C.     Past Medical History:   Diagnosis Date    Allergy     Anemia     Arthritis     Cataract     CKD (chronic kidney disease)     Gout, unspecified     Hypertension     Hypothyroidism     MDS (myelodysplastic syndrome) 8/14/2017    Menopause     PNA (pneumonia) 10/15/2017    Sickle cell trait     Trigger finger        Past Surgical History:   Procedure Laterality Date    APPENDECTOMY      HEMORRHOID SURGERY      HYSTERECTOMY         Subjective     Chief Complaint: no complaints  Patient/Family stated goals: return home  Communicated with: RN prior to session.  Pain/Comfort:  · Pain Rating 1: 0/10  · Pain Rating Post-Intervention 1: 0/10    Objective:     Patient found with:      General Precautions: Standard, fall   Orthopedic Precautions:N/A   Braces: N/A     Occupational Performance:    Bed Mobility:    · Patient completed Rolling/Turning to Right with stand by assistance  · Patient completed Scooting/Bridging with stand by assistance  · Patient completed Supine to Sit with stand by assistance  · Patient completed Sit to Supine with stand by assistance    Functional Mobility/Transfers:  · Patient completed Sit <> Stand Transfer with stand by assistance  with  no assistive device   · Patient completed Bed <> Chair Transfer using Stand Pivot technique with stand by assistance with no assistive device    Activities of Daily Living:  · LB Dressing: independence donned/doffed  "socks    Cognitive/Visual Perceptual:  Cognitive/Psychosocial Skills:     -       Oriented to: Person, Place, Time and Situation   -       Follows Commands/attention:Follows multistep  commands  -       Communication: clear/fluent  -       Memory: No Deficits noted  -       Safety awareness/insight to disability: intact   -       Mood/Affect/Coping skills/emotional control: Appropriate to situation  Visual/Perceptual:      -Intact    Physical Exam:  Balance:    -       Pt displaed good sitting static and dynmaic balance. Pt gait is unsteady but no LOB noted  Postural examination/scapula alignment:    -       Rounded shoulders  -       Forward head  Skin integrity: Visible skin intact  Edema:  None noted  Sensation:    -       Intact  Upper Extremity Range of Motion:     -       Right Upper Extremity: WFL  -       Left Upper Extremity: WFL  Upper Extremity Strength:    -       Right Upper Extremity: WFL  -       Left Upper Extremity: WFL   Strength:    -       Right Upper Extremity: WFL  -       Left Upper Extremity: WFL  Fine Motor Coordination:    -       Intact  Gross motor coordination:   WFL    Patient left supine on stretcher  with RN and transport staff present    [facility] 6 Click:  [facility] Total Score: 24    Treatment & Education:  Pt educated on POC.  Education:    Assessment:     Shara Rose is a 82 y.o. female with a medical diagnosis of <principal problem not specified>.  She presents with no need for acute OT services at this time d/t being at [facility]. Pt recommended to D/C home.  Performance deficits affecting function are gait instability.      Rehab Prognosis:  good; patient would benefit from acute skilled OT services to address these deficits and reach maximum level of function.         Clinical Decision Makin.  OT Low:  "Pt evaluation falls under low complexity for evaluation coding due to performance deficits noted in 1-3 areas as stated above and 0 co-morbities affecting current " "functional status. Data obtained from problem focused assessments. No modifications or assistance was required for completion of evaluation. Only brief occupational profile and history review completed."     Plan:     · Patient to be seen  (D/C acute OT services)  · Plan of Care Reviewed with: patient    This Plan of care has been discussed with the patient who was involved in its development and understands and is in agreement with the identified goals and treatment plan    GOALS:    Occupational Therapy Goals        Problem: Occupational Therapy Goal    Goal Priority Disciplines Outcome Interventions   Occupational Therapy Goal     OT, PT/OT     Description:  is currently performing ADLs, functional mobility & t/fs at baseline and displays age-appropriate strength, endurance & balance. OT services are not recommended at this time and patient is safe to D/C home.                      Time Tracking:     OT Date of Treatment: 12/12/17  OT Start Time: 0903  OT Stop Time: 0917  OT Total Time (min): 14 min    Billable Minutes:Evaluation 14 minutes    Moises Rosario OT  12/12/2017    "

## 2017-12-12 NOTE — PROGRESS NOTES
Admit Assessment    Patient Identification  Shara Rose   :  1935  Admit Date:  2017  Attending Provider:  Maurisio Forbes MD              Referral:   Pt was admitted to  with a diagnosis of <principal problem not specified>, and was admitted this hospital stay due to Thrombocytopenia [D69.6]  Anemia, unspecified type [D64.9].   is involved was referred to the Social Work Department via (Referal).  Patient presents as a 82 y.o. year old  female.    Persons interviewed: pt    Living Situation:  Prior to admission pt was living with her spouse Ramesh. Pt was independent and was able to drive.       Resides at [address] [address] [facility] [address], phone: [phone] (home).      Current or Past Agencies and Description of Services/Supplies    DME  N/A     Home Health  N/A     IV Infusion  N/A     Nutrition: oral    Outpatient Pharmacy:     [facility] [address] [place] [address] [address] [place] [address] BLVD AT [facility]  [address]  [facility] [address]  Phone: [phone] Fax: [phone]    [facility] [address] [facility] [facility]  [address] [facility] [address]  Phone: [phone] Fax: [phone]      Patient Preference of agencies include none noted    Patient/Caregiver informed of right to choose providers or agencies.  Patient provides permission to release any necessary information to [facility] and to Non-[facility] agencies as needed to facilitate patient care, treatment planning, and patient discharge planning.  Written and verbal resources provided.      Coping  Pt stated she is in good spirits     Adjustment to Diagnosis and Treatment  appropriate     Emotional/Behavioral/Cognitive Issues  None noted  History/Current Symptoms of Anxiety/Depression: No  History/Current Substance Use:   Social History     Social History Main Topics    Smoking status: Former Smoker      Packs/day: 0.25     Years: 1.00     Quit date: 10/19/1972    Smokeless tobacco: Never Used    Alcohol use No    Drug use: No    Sexual activity: No       Indications of Abuse/Neglect: no  Abuse Screen  Do You Feel Unsafe at Home, Work or School?: no    Financial:  Payor/Plan Subscr  Sex Relation Sub. Ins. ID Effective Group Num   1. [employer] MANAGE* DAPHNIE MARTINEZ 1935 Female  [id number]                                   [address] [address]        Other identified concerns/needs: none at this time    Plan: Pt anticipated to return home with family support at [place], likely this afternoon    Interventions/Referrals: none    Patient/caregiver engaged in treatment planning process.     providing psychosocial and supportive counseling, resources, education, assistance and discharge planning as appropriate.  Patient/caregiver state understanding of  available resources,  following, remains available.

## 2017-12-12 NOTE — ED TRIAGE NOTES
"Shara Rose, a 82 y.o. female presents to the ED intake 2      Chief Complaint   Patient presents with    multiple complaints     foot still swollen, told was gout few weeks ago, both legs weak, feel like needing blood pr platelet transfusion    Fatigue     last chemo dec 5, mask applied     Pt states she has a hx of low platelets. Unsure when was the last platelets infusion given. She states she was given gout medication and started to "break out" so she stopped taking them. +right foot pain; darkness noted to medial foot. Denies fevers at home.       Review of patient's allergies indicates:   Allergen Reactions    Sulfa (sulfonamide antibiotics) Itching and Rash     Past Medical History:   Diagnosis Date    Allergy     Anemia     Arthritis     Cataract     CKD (chronic kidney disease)     Gout, unspecified     Hypertension     Hypothyroidism     MDS (myelodysplastic syndrome) 8/14/2017    Menopause     PNA (pneumonia) 10/15/2017    Sickle cell trait     Trigger finger      "

## 2017-12-12 NOTE — PLAN OF CARE
MDR's with Dr Forbes.  Patient is admitted for symptomatic anemia.  Hgb 4.4 on admit.  Now 7.1 post 3 U PRBC's.  Plan for possible d/c this evening if stable.  No HH/DME needs.  Will continue to follow.    Future Appointments  Date Time Provider Department Center   12/15/2017 10:00 AM LAB, HEMONC SAME DAY [facility]   12/22/2017 2:30 PM LAB, HEMONC SAME DAY [facility]   12/22/2017 3:20 PM Juan Wang MD [facility] BM [facility]   12/22/2017 4:00 PM [facility]MO [facility]

## 2017-12-15 ENCOUNTER — LAB VISIT (OUTPATIENT)
Dept: LAB | Facility: HOSPITAL | Age: 82
End: 2017-12-15
Attending: INTERNAL MEDICINE
Payer: MEDICARE

## 2017-12-15 ENCOUNTER — TELEPHONE (OUTPATIENT)
Dept: HEMATOLOGY/ONCOLOGY | Facility: CLINIC | Age: 82
End: 2017-12-15

## 2017-12-15 DIAGNOSIS — D46.9 MYELODYSPLASTIC SYNDROME: ICD-10-CM

## 2017-12-15 DIAGNOSIS — D47.2 MONOCLONAL GAMMOPATHY OF UNDETERMINED SIGNIFICANCE: ICD-10-CM

## 2017-12-15 LAB
ABO + RH BLD: NORMAL
ANISOCYTOSIS BLD QL SMEAR: SLIGHT
BASOPHILS # BLD AUTO: 0 K/UL
BASOPHILS NFR BLD: 0 %
BLD GP AB SCN CELLS X3 SERPL QL: NORMAL
DIFFERENTIAL METHOD: ABNORMAL
EOSINOPHIL # BLD AUTO: 0 K/UL
EOSINOPHIL NFR BLD: 2 %
ERYTHROCYTE [DISTWIDTH] IN BLOOD BY AUTOMATED COUNT: 13.7 %
HCT VFR BLD AUTO: 25.5 %
HGB BLD-MCNC: 8.6 G/DL
IMM GRANULOCYTES # BLD AUTO: 0.02 K/UL
IMM GRANULOCYTES NFR BLD AUTO: 2 %
LYMPHOCYTES # BLD AUTO: 0.9 K/UL
LYMPHOCYTES NFR BLD: 88.2 %
MCH RBC QN AUTO: 29.6 PG
MCHC RBC AUTO-ENTMCNC: 33.7 G/DL
MCV RBC AUTO: 88 FL
MONOCYTES # BLD AUTO: 0 K/UL
MONOCYTES NFR BLD: 1 %
NEUTROPHILS # BLD AUTO: 0.1 K/UL
NEUTROPHILS NFR BLD: 6.8 %
NRBC BLD-RTO: 0 /100 WBC
OVALOCYTES BLD QL SMEAR: ABNORMAL
PLATELET # BLD AUTO: 20 K/UL
PLATELET BLD QL SMEAR: ABNORMAL
PMV BLD AUTO: 9.1 FL
POIKILOCYTOSIS BLD QL SMEAR: SLIGHT
RBC # BLD AUTO: 2.91 M/UL
WBC # BLD AUTO: 1.02 K/UL

## 2017-12-15 PROCEDURE — 85025 COMPLETE CBC W/AUTO DIFF WBC: CPT

## 2017-12-15 PROCEDURE — 86900 BLOOD TYPING SEROLOGIC ABO: CPT

## 2017-12-15 PROCEDURE — 36415 COLL VENOUS BLD VENIPUNCTURE: CPT

## 2017-12-15 PROCEDURE — 86901 BLOOD TYPING SEROLOGIC RH(D): CPT

## 2017-12-22 ENCOUNTER — TELEPHONE (OUTPATIENT)
Dept: HEMATOLOGY/ONCOLOGY | Facility: CLINIC | Age: 82
End: 2017-12-22

## 2017-12-22 ENCOUNTER — INFUSION (OUTPATIENT)
Dept: INFUSION THERAPY | Facility: HOSPITAL | Age: 82
End: 2017-12-22
Attending: INTERNAL MEDICINE
Payer: MEDICARE

## 2017-12-22 ENCOUNTER — OFFICE VISIT (OUTPATIENT)
Dept: HEMATOLOGY/ONCOLOGY | Facility: CLINIC | Age: 82
End: 2017-12-22
Payer: MEDICARE

## 2017-12-22 VITALS
HEART RATE: 61 BPM | OXYGEN SATURATION: 100 % | SYSTOLIC BLOOD PRESSURE: 161 MMHG | DIASTOLIC BLOOD PRESSURE: 59 MMHG | TEMPERATURE: 98 F | RESPIRATION RATE: 18 BRPM

## 2017-12-22 VITALS
DIASTOLIC BLOOD PRESSURE: 83 MMHG | WEIGHT: 122.56 LBS | HEIGHT: 65 IN | BODY MASS INDEX: 20.42 KG/M2 | SYSTOLIC BLOOD PRESSURE: 192 MMHG

## 2017-12-22 DIAGNOSIS — D61.818 PANCYTOPENIA: Primary | ICD-10-CM

## 2017-12-22 DIAGNOSIS — D46.9 MYELODYSPLASTIC SYNDROME: ICD-10-CM

## 2017-12-22 PROCEDURE — P9037 PLATE PHERES LEUKOREDU IRRAD: HCPCS

## 2017-12-22 PROCEDURE — P9040 RBC LEUKOREDUCED IRRADIATED: HCPCS

## 2017-12-22 PROCEDURE — 86920 COMPATIBILITY TEST SPIN: CPT

## 2017-12-22 PROCEDURE — 96374 THER/PROPH/DIAG INJ IV PUSH: CPT

## 2017-12-22 PROCEDURE — 99215 OFFICE O/P EST HI 40 MIN: CPT | Mod: S$GLB,,, | Performed by: INTERNAL MEDICINE

## 2017-12-22 PROCEDURE — 25000003 PHARM REV CODE 250: Performed by: INTERNAL MEDICINE

## 2017-12-22 PROCEDURE — 86644 CMV ANTIBODY: CPT

## 2017-12-22 PROCEDURE — 63600175 PHARM REV CODE 636 W HCPCS: Performed by: INTERNAL MEDICINE

## 2017-12-22 PROCEDURE — 99999 PR PBB SHADOW E&M-EST. PATIENT-LVL II: CPT | Mod: PBBFAC,,, | Performed by: INTERNAL MEDICINE

## 2017-12-22 PROCEDURE — 36430 TRANSFUSION BLD/BLD COMPNT: CPT

## 2017-12-22 RX ORDER — HYDROCODONE BITARTRATE AND ACETAMINOPHEN 500; 5 MG/1; MG/1
TABLET ORAL ONCE
Status: CANCELLED | OUTPATIENT
Start: 2017-12-22 | End: 2017-12-22

## 2017-12-22 RX ORDER — ACETAMINOPHEN 325 MG/1
650 TABLET ORAL
Status: CANCELLED | OUTPATIENT
Start: 2017-12-22

## 2017-12-22 RX ORDER — CLONIDINE HYDROCHLORIDE 0.1 MG/1
0.1 TABLET ORAL
Status: DISCONTINUED | OUTPATIENT
Start: 2017-12-22 | End: 2017-12-22 | Stop reason: HOSPADM

## 2017-12-22 RX ORDER — ACETAMINOPHEN 325 MG/1
650 TABLET ORAL
Status: COMPLETED | OUTPATIENT
Start: 2017-12-22 | End: 2017-12-22

## 2017-12-22 RX ORDER — DIPHENHYDRAMINE HYDROCHLORIDE 50 MG/ML
25 INJECTION INTRAMUSCULAR; INTRAVENOUS
Status: CANCELLED | OUTPATIENT
Start: 2017-12-22

## 2017-12-22 RX ORDER — HYDROCODONE BITARTRATE AND ACETAMINOPHEN 500; 5 MG/1; MG/1
TABLET ORAL ONCE
Status: COMPLETED | OUTPATIENT
Start: 2017-12-22 | End: 2017-12-22

## 2017-12-22 RX ORDER — DIPHENHYDRAMINE HYDROCHLORIDE 50 MG/ML
25 INJECTION INTRAMUSCULAR; INTRAVENOUS
Status: COMPLETED | OUTPATIENT
Start: 2017-12-22 | End: 2017-12-22

## 2017-12-22 RX ADMIN — ACETAMINOPHEN 650 MG: 325 TABLET ORAL at 04:12

## 2017-12-22 RX ADMIN — DIPHENHYDRAMINE HYDROCHLORIDE 25 MG: 50 INJECTION INTRAMUSCULAR; INTRAVENOUS at 04:12

## 2017-12-22 RX ADMIN — SODIUM CHLORIDE: 9 INJECTION, SOLUTION INTRAVENOUS at 04:12

## 2017-12-22 NOTE — PROGRESS NOTES
q tues/frid labs unitl next appt 10-11am appt only due to ride; next 12/26   First available marrow under sedation  Gout better; restart colchicine; stopped because thought petechiae were side effect  Fu few days after marrow to review results.

## 2017-12-22 NOTE — PROGRESS NOTES
S/p 4 vidaza  rtc one week for labs and bone marrow  Twice Weekly labs and fu 1 week after bone marrow

## 2017-12-22 NOTE — PLAN OF CARE
Problem: Patient Care Overview  Goal: Individualization & Mutuality  PIV  Warm blankets   Outcome: Ongoing (interventions implemented as appropriate)  5502 --  Patient's labs, history, allergies, and medication reviewed.  Patient to receive 1 unit of PRBC and 1 unit of PLTs.  Discussed plan of care with patient.  Patient in agreement.  Will monitor.

## 2017-12-22 NOTE — Clinical Note
-q tues/friday cbc, cmp, type and screen (10am-11am as only time she can get rides) for next 4  weeks  -first available bone marrow biopsy under sedation  -same labs, MD appt, chair for 7 days of vidaza injections in 4 weeks

## 2017-12-22 NOTE — PROGRESS NOTES
SECTION OF HEMATOLOGY AND BONE MARROW TRANSPLANT  Return Patient Visit   12/25/2017    CHIEF COMPLAINT:   No chief complaint on file.      HISTORY OF PRESENT ILLNESS:   Shara Rose is a 82 y.o. female who is referred to me July 2017 for a Hematology consultation for further evaluation of anemia. Patient's past medical history includes Sickle Cell Trait, Hypothyroidism, Chronic Kidney Disease.  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  Decision made to proceed with palliative vidaza.  She is now s/p 4 cycles.  She required 2 admissions during her therapy for CAP and a gout flare. She has been dependent on tranfusions of plts and blood since initiation of therapy.    She presents to clinic for fu.  Denies fever, chills, nightsweats, bleeding, brusing, lymphadenopathy, signs/symptoms of splenomegaly.        PAST MEDICAL HISTORY:   Past Medical History:   Diagnosis Date    Allergy     Anemia     Arthritis     Cataract     CKD (chronic kidney disease)     Gout, unspecified     Hypertension     Hypothyroidism     MDS (myelodysplastic syndrome) 8/14/2017    Menopause     PNA (pneumonia) 10/15/2017    Sickle cell trait     Trigger finger        PAST SURGICAL HISTORY:   Past Surgical History:   Procedure Laterality Date    APPENDECTOMY      HEMORRHOID SURGERY      HYSTERECTOMY         PAST SOCIAL HISTORY:   reports that she quit smoking about 45 years ago. She has a 0.25 pack-year smoking history. She has never used smokeless tobacco. She reports that she does not drink alcohol or use drugs.    FAMILY HISTORY:  Family History   Problem Relation Age of Onset    Hypertension Mother     Peripheral vascular disease Father     Heart disease Father     Cataracts Father     Cancer Son     Diabetes Neg Hx     Amblyopia Neg Hx     Blindness Neg Hx     Glaucoma Neg Hx     Macular degeneration Neg Hx     Retinal detachment Neg Hx      "Strabismus Neg Hx        CURRENT MEDICATIONS:   Current Outpatient Prescriptions   Medication Sig    acyclovir (ZOVIRAX) 200 MG capsule Take 2 capsules (400 mg total) by mouth 2 (two) times daily.    allopurinol (ZYLOPRIM) 100 MG tablet Take 1 tablet (100 mg total) by mouth once daily. For gout    AZACITIDINE (VIDAZA INJ) Inject as directed.    ciprofloxacin HCl (CIPRO) 500 MG tablet Take 1 tablet (500 mg total) by mouth 2 (two) times daily.    clotrimazole-betamethasone 1-0.05% (LOTRISONE) cream Apply topically 2 (two) times daily.    colchicine 0.6 mg tablet Take 1 tablet (0.6 mg total) by mouth once daily.    levothyroxine (SYNTHROID) 25 MCG tablet TAKE 1 TABLET EVERY DAY    meclizine (ANTIVERT) 25 mg tablet TK 1 T PO BID FOR 10 DAYS PRF DIZZINESS    ondansetron (ZOFRAN) 8 MG tablet Take 1 tablet (8 mg total) by mouth every 12 (twelve) hours as needed for Nausea.    pantoprazole (PROTONIX) 40 MG tablet Take 1 tablet (40 mg total) by mouth once daily. While on prednisone    valsartan (DIOVAN) 160 MG tablet Take 1 tablet (160 mg total) by mouth once daily.    verapamil (CALAN-SR) 240 MG CR tablet TAKE 1 TABLET EVERY DAY     No current facility-administered medications for this visit.      ALLERGIES:   Review of patient's allergies indicates:   Allergen Reactions    Sulfa (sulfonamide antibiotics) Itching and Rash           REVIEW OF SYSTEMS:   Review of Systems - General ROS: negative  Psychological ROS: negative  Ophthalmic ROS: negative  Allergy and Immunology ROS: negative  Breast ROS: negative  Respiratory ROS: negative  Cardiovascular ROS: negative  Gastrointestinal ROS: negative  Genito-Urinary ROS: negative  Musculoskeletal ROS: negative  Neurological ROS: negative  Dermatological ROS: negative    PHYSICAL EXAM:   Vitals:    12/22/17 1527   BP: (!) 192/83   Weight: 55.6 kg (122 lb 9.2 oz)   Height: 5' 5" (1.651 m)     General - well developed, well nourished, no apparent distress  HEENT - " oropharynx clear  Chest and Lung - clear to auscultation bilaterally   Cardiovascular - RRR with no MGR, normal S1 and S2  Abdomen-  soft, nontender, no palpable hepatomegaly or splenomegaly  Lymph - no palpable lymphadenopathy  Heme - no bruising, petechiae, pallor  Skin - no rashes or lesions  Psych - appropriate mood and affect      ECOG Performance Status: (foot note - ECOG PS provided by [employer]cology [employer]) 1 - Symptomatic but completely ambulatory    Karnofsky Performance Score:  80%- Normal Activity with Effort: Some Symptoms of Disease  DATA:   Lab Results   Component Value Date    WBC 1.07 (LL) 12/22/2017    HGB 7.1 (L) 12/22/2017    HCT 21.2 (L) 12/22/2017    MCV 86 12/22/2017    PLT 1 (LL) 12/22/2017     Gran #   Date Value Ref Range Status   12/15/2017 0.1 (L) 1.8 - 7.7 K/uL Final     Gran%   Date Value Ref Range Status   12/22/2017 2.0 (L) 38.0 - 73.0 % Corrected     Comment:     Corrected result; previously reported as 5.7 on %DDDDDDDD% at %TTT%.     Lymph #   Date Value Ref Range Status   12/15/2017 0.9 (L) 1.0 - 4.8 K/uL Final     Lymph%   Date Value Ref Range Status   12/22/2017 94.0 (H) 18.0 - 48.0 % Corrected     Comment:     Corrected result; previously reported as 91.6 on %DDDDDDDD% at %TTT%.     CMP  Sodium   Date Value Ref Range Status   12/12/2017 142 136 - 145 mmol/L Final     Potassium   Date Value Ref Range Status   12/12/2017 3.8 3.5 - 5.1 mmol/L Final     Chloride   Date Value Ref Range Status   12/12/2017 109 95 - 110 mmol/L Final     CO2   Date Value Ref Range Status   12/12/2017 24 23 - 29 mmol/L Final     Glucose   Date Value Ref Range Status   12/12/2017 85 70 - 110 mg/dL Final     BUN, Bld   Date Value Ref Range Status   12/12/2017 15 8 - 23 mg/dL Final     Creatinine   Date Value Ref Range Status   12/12/2017 1.0 0.5 - 1.4 mg/dL Final     Calcium   Date Value Ref Range Status   12/12/2017 9.4 8.7 - 10.5 mg/dL Final     Total Protein   Date Value Ref Range Status    12/12/2017 7.7 6.0 - 8.4 g/dL Final     Albumin   Date Value Ref Range Status   12/12/2017 2.9 (L) 3.5 - 5.2 g/dL Final     Total Bilirubin   Date Value Ref Range Status   12/12/2017 3.1 (H) 0.1 - 1.0 mg/dL Final     Comment:     For infants and newborns, interpretation of results should be based  on gestational age, weight and in agreement with clinical  observations.  Premature Infant recommended reference ranges:  Up to 24 hours.............<8.0 mg/dL  Up to 48 hours............<12.0 mg/dL  3-5 days..................<15.0 mg/dL  6-29 days.................<15.0 mg/dL       Alkaline Phosphatase   Date Value Ref Range Status   12/12/2017 103 55 - 135 U/L Final     AST   Date Value Ref Range Status   12/12/2017 16 10 - 40 U/L Final     ALT   Date Value Ref Range Status   12/12/2017 7 (L) 10 - 44 U/L Final     Anion Gap   Date Value Ref Range Status   12/12/2017 9 8 - 16 mmol/L Final     eGFR if    Date Value Ref Range Status   12/12/2017 >60.0 >60 mL/min/1.73 m^2 Final     eGFR if non    Date Value Ref Range Status   12/12/2017 53.0 (A) >60 mL/min/1.73 m^2 Final     Comment:     Calculation used to obtain the estimated glomerular filtration  rate (eGFR) is the CKD-EPI equation.        LD   Date Value Ref Range Status   08/03/2017 184 110 - 260 U/L Final     Comment:     Results are increased in hemolyzed samples.     Lab Results   Component Value Date    IRON 122 08/03/2017    TIBC 209 (L) 08/03/2017    FERRITIN 576 (H) 08/03/2017     Lab Results   Component Value Date    [id number] 362 08/03/2017     Lab Results   Component Value Date    FOLATE 35.2 (H) 08/03/2017   Pathologist Interpretation TAHIR   Pathologist Interpretation TAHIR   Collected: 08/03/17 1522   Resulting lab: [facility]   Value: REVIEWED   Comment: Electronically reviewed and signed by:   Rhianna Mao MD   Signed on 08/04/17 at 13:32   There is a very faint IgA kappa monoclonal band in  beta. - SPEP negative     HEMOGLOBIN ELECTROPHORESIS,HGB A2 UGO.   Order: [id number]   Status:  Final result   Visible to patient:  No (Not Released) Next appt:  None Dx:  Anemia     Ref Range & Units 3yr ago 6yr ago    Hgb A2 Quant 1.5 - 3.8 % 2.4 2.2CM    Hemoglobin Bands   Hb A , Hb S and Hb A2 textCM    Hemoglobin Electrophoresis Interp   See comment    Comments: Hemoglobins A and S are present, measuring approximately 62% and 37%   respectively.  This pattern is compatible with the patient's history   of sickle cell trait, provided the patient has no recent history of   red cell transfusion.  Correlate clinically.   Interpreted by Rhianna Mao M.D.                  Bone Marrow biopsy - 8/7/17  SPECIMEN  1) Bone marrow clot, left iliac crest.  2) Bone marrow core biopsy, left iliac crest.  3) Bone Marrow Aspirate (Slides)  FINAL PATHOLOGIC DIAGNOSIS  LEFT ILIAC CREST BONE MARROW ASPIRATE SMEAR, CLOT SECTION, AND CORE BIOPSY:  -- HYPERCELLULAR MARROW FOR AGE (70%) WITH ERYTHROID HYPERPLASIA, RING SIDEROBLASTS  AND ATYPICAL MEGAKARYOCYTIC PROLIFERATION, HIGHLY SUGGESTIVE OF A MYELODYSPLASTIC  SYNDROME.  -- INCREASED IRON STORAGE.  -- SEE COMMENT.  COMMENT:  CBC data (8/3/2017): WBC 2.31 (granulocyte 22.9%, lymphocyte 55%, monocyte 5.2%, eosinophil 16.9%), RBC  2.66, HGB 8.5, HCT 24.7, MCV 93, MCHC 34.4, PLT 24.  Peripheral blood smear is not submitted for review.  Flow cytometric analysis of bone marrow shows populations of polyclonal B lymphocytes and T lymphocytes that  are immunophenotypically unremarkable. The blast gate is not increased.  Immunohistochemical stains are performed on the biopsy core for greater sensitivity and further architectural  assessment with adequate controls. CD34 highlights rare immature mononuclear cells. The early erythroid  precursors are positive for E-cadherin. CD61 highlights markedly increased megakaryocytes with frequent small  forms. The small lymphoid aggregates are  composed of a mixture of CD3- positive T-cells and CD20-positive  B-cells.  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  Correlation with cytogenetics is critical. Close clinical follow-up is required.  Diagnosed by: Gio Prado  (Electronically Signed: 2017-08-10 15:02:16)  Microscopic Examination  BONE MARROW ASPIRATE DIFFERENTIAL:  Blasts----------------------------------------------1%  Promyelocytes---------------------------------0%  Myelocytes--------------------------------------3%  Metamyelocytes------------------------------ 5%  Bands----------------------------------------------4%  PMN Neutrophils------------------------------6%  Eosinophils------------------------------------------2%  Basophils---------------------------------------0%  Monocytes------------------------------------1%  Lymphocytes-------------------------------------31%  Plasma cells------------------------------------------1%  Erythroid precursors--------------------------47%  BONE MARROW ASPIRATE SMEAR:  The smears contain cellular spicules. The myeloid to erythroid ratio is markedly decreased, approximately 0.5:1.  Some erythroid precursors display megaloblastoid change and a shift towards immaturity. Blasts are not increased.  Megakaryocytes are increased and display frequent atypical nuclear features including hypolobation and small forms  as well as occasional forms with  nuclei. Stainable iron is increased. Ring sideroblasts are seen,  approximately 30-35% of the erythroid precursors.  BONE MARROW CLOT SECTION:  The clot sections contain small spicules with cellular composition that is similar to the biopsy core. Stainable iron is  present.  BONE MARROW CORE BIOPSY:  Cortical and medullary bones are present. The cellularity is approximately 70%. The myeloid  to erythroid ratio is  markedly decreased. Megakaryocytes are increased and form small clusters in areas. Small lymphoid aggregates  composed of small mature lymphocytes are seen. Stainable iron is present.  ASSESSMENT AND PLAN:   Encounter Diagnoses   Name Primary?   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 ; Int 2 IPSS; Very high risk R-IPSS  -not a stem cell transplant candidate due to age/comorbidities  -discussed terminal nature of diagnosis and recommendations for hypomethylating agent therapy  -discussed risks of disease associated with bone marrow failure and transformation to AML  -can consider addition of GASTON if no response in anemia to vidaza   -pancytopenia due to MDS and vidaza ; suspect anemia augmented buy underlying sickle trait ;  transfuse for plt >10, hgb >7; plan for 1 unit prbc and 1 unit plt today; given transfusion requirements plan for twice weekly labs and prn transfusions   -she has now completed 4 cycles of vidaza (last 11/28/17) ; plan for repeat bone marrow biopsy for first available under sedation (patient requesting sedation); pending results of marrow may resume vidaza with cycle #5   -reviewed neutropenic and bleeding precautions with patient  -prn zofran for home use for CINV  -plan for   -for MGUS; anemia likely due to MDS; no hypercalcemia, Cr at baseline; will discuss skeletal survey at next appt but lack of clonal plasma cells in bone marrow make underlying myeloma highly unlikely    2)ID  -completed course of levaquin for CAP on 10/25/17  -transition back to cipro ppx  -contineu acyc, fluc ppx     3)gout  -continue colchicine; improving     -continue all other chronic medications per pcp     Follow Up:  -q tues/friday cbc, cmp, type and screen (10am-11am as only time she can get rides) for next 4   weeks   -first available bone marrow biopsy under sedation   -same labs, MD appt, chair  for 7 days of vidaza injections in 4 weeks      Ankit Wang MD  Hematology/Oncology/Bone Marrow Transplant

## 2017-12-23 NOTE — PLAN OF CARE
Problem: Patient Care Overview  Goal: Plan of Care Review  Outcome: Ongoing (interventions implemented as appropriate)  2992 -- Patient tolerated 1 unit of RBC and 1 unit of PLTs without complication.  Discharged patient without s/s of adverse event.  Outpatient Blood Transfusion handout given and instructed of s/s to notify provider of.  Patient verbalized understanding.

## 2017-12-26 ENCOUNTER — TELEPHONE (OUTPATIENT)
Dept: HEMATOLOGY/ONCOLOGY | Facility: CLINIC | Age: 82
End: 2017-12-26

## 2017-12-26 DIAGNOSIS — D46.9 MDS (MYELODYSPLASTIC SYNDROME): Primary | ICD-10-CM

## 2017-12-26 NOTE — TELEPHONE ENCOUNTER
Signed case request.  Nidhi Bai DNP, NP  Hematology/Oncology  -------  Called pt to schedule BMBX , pt was told to report to the second floor in the main hospital [facility] Day Surgery Dept. Pt was told to be NPO starting at midnight the day prior to surgery. Pt was advised to hold all blood thinning medications prior to BMBX & was advised to have a ride home. Pt voiced verbal understanding of these directions. Will also mail instruction sheet to pt's home.  Case request pended and routed to Nidhi Bai.    Yue Hinojosa

## 2018-01-02 ENCOUNTER — INFUSION (OUTPATIENT)
Dept: INFUSION THERAPY | Facility: HOSPITAL | Age: 83
End: 2018-01-02
Attending: INTERNAL MEDICINE
Payer: MEDICARE

## 2018-01-02 ENCOUNTER — TELEPHONE (OUTPATIENT)
Dept: HEMATOLOGY/ONCOLOGY | Facility: CLINIC | Age: 83
End: 2018-01-02

## 2018-01-02 VITALS
SYSTOLIC BLOOD PRESSURE: 159 MMHG | TEMPERATURE: 99 F | RESPIRATION RATE: 16 BRPM | DIASTOLIC BLOOD PRESSURE: 69 MMHG | HEART RATE: 72 BPM

## 2018-01-02 DIAGNOSIS — D46.9 MDS (MYELODYSPLASTIC SYNDROME): Primary | ICD-10-CM

## 2018-01-02 DIAGNOSIS — D46.9 MDS (MYELODYSPLASTIC SYNDROME): ICD-10-CM

## 2018-01-02 DIAGNOSIS — D61.818 PANCYTOPENIA: ICD-10-CM

## 2018-01-02 PROCEDURE — P9038 RBC IRRADIATED: HCPCS

## 2018-01-02 PROCEDURE — 96374 THER/PROPH/DIAG INJ IV PUSH: CPT

## 2018-01-02 PROCEDURE — 36430 TRANSFUSION BLD/BLD COMPNT: CPT

## 2018-01-02 PROCEDURE — 25000003 PHARM REV CODE 250: Performed by: INTERNAL MEDICINE

## 2018-01-02 PROCEDURE — P9037 PLATE PHERES LEUKOREDU IRRAD: HCPCS

## 2018-01-02 PROCEDURE — 86920 COMPATIBILITY TEST SPIN: CPT | Mod: 59

## 2018-01-02 PROCEDURE — 27201040 HC RC 50 FILTER

## 2018-01-02 PROCEDURE — 63600175 PHARM REV CODE 636 W HCPCS: Performed by: INTERNAL MEDICINE

## 2018-01-02 PROCEDURE — 86644 CMV ANTIBODY: CPT

## 2018-01-02 RX ORDER — HYDROCODONE BITARTRATE AND ACETAMINOPHEN 500; 5 MG/1; MG/1
TABLET ORAL ONCE
Status: CANCELLED | OUTPATIENT
Start: 2018-01-02 | End: 2018-01-02

## 2018-01-02 RX ORDER — DIPHENHYDRAMINE HYDROCHLORIDE 50 MG/ML
25 INJECTION INTRAMUSCULAR; INTRAVENOUS
Status: CANCELLED | OUTPATIENT
Start: 2018-01-02

## 2018-01-02 RX ORDER — HYDROCODONE BITARTRATE AND ACETAMINOPHEN 500; 5 MG/1; MG/1
TABLET ORAL ONCE
Status: COMPLETED | OUTPATIENT
Start: 2018-01-02 | End: 2018-01-02

## 2018-01-02 RX ORDER — ACETAMINOPHEN 325 MG/1
650 TABLET ORAL
Status: COMPLETED | OUTPATIENT
Start: 2018-01-02 | End: 2018-01-02

## 2018-01-02 RX ORDER — DIPHENHYDRAMINE HYDROCHLORIDE 50 MG/ML
25 INJECTION INTRAMUSCULAR; INTRAVENOUS
Status: COMPLETED | OUTPATIENT
Start: 2018-01-02 | End: 2018-01-02

## 2018-01-02 RX ORDER — ACETAMINOPHEN 325 MG/1
650 TABLET ORAL
Status: CANCELLED | OUTPATIENT
Start: 2018-01-02

## 2018-01-02 RX ADMIN — ACETAMINOPHEN 650 MG: 325 TABLET ORAL at 12:01

## 2018-01-02 RX ADMIN — SODIUM CHLORIDE: 900 INJECTION, SOLUTION INTRAVENOUS at 12:01

## 2018-01-02 RX ADMIN — DIPHENHYDRAMINE HYDROCHLORIDE 25 MG: 50 INJECTION INTRAMUSCULAR; INTRAVENOUS at 12:01

## 2018-01-03 RX ORDER — VERAPAMIL HYDROCHLORIDE 240 MG/1
TABLET, FILM COATED, EXTENDED RELEASE ORAL
Qty: 90 TABLET | Refills: 3 | Status: SHIPPED | OUTPATIENT
Start: 2018-01-03

## 2018-01-09 ENCOUNTER — TELEPHONE (OUTPATIENT)
Dept: HEMATOLOGY/ONCOLOGY | Facility: CLINIC | Age: 83
End: 2018-01-09

## 2018-01-09 ENCOUNTER — INFUSION (OUTPATIENT)
Dept: INFUSION THERAPY | Facility: HOSPITAL | Age: 83
End: 2018-01-09
Attending: INTERNAL MEDICINE
Payer: MEDICARE

## 2018-01-09 VITALS
SYSTOLIC BLOOD PRESSURE: 174 MMHG | HEART RATE: 70 BPM | RESPIRATION RATE: 16 BRPM | TEMPERATURE: 99 F | DIASTOLIC BLOOD PRESSURE: 76 MMHG

## 2018-01-09 DIAGNOSIS — D46.Z MDS (MYELODYSPLASTIC SYNDROME), HIGH GRADE: ICD-10-CM

## 2018-01-09 DIAGNOSIS — D46.9 MDS (MYELODYSPLASTIC SYNDROME): Primary | ICD-10-CM

## 2018-01-09 DIAGNOSIS — D46.9 MDS (MYELODYSPLASTIC SYNDROME): ICD-10-CM

## 2018-01-09 PROCEDURE — 86920 COMPATIBILITY TEST SPIN: CPT

## 2018-01-09 PROCEDURE — 27201040 HC RC 50 FILTER

## 2018-01-09 PROCEDURE — 36430 TRANSFUSION BLD/BLD COMPNT: CPT

## 2018-01-09 PROCEDURE — P9037 PLATE PHERES LEUKOREDU IRRAD: HCPCS

## 2018-01-09 PROCEDURE — 25000003 PHARM REV CODE 250: Performed by: NURSE PRACTITIONER

## 2018-01-09 PROCEDURE — P9038 RBC IRRADIATED: HCPCS

## 2018-01-09 RX ORDER — DIPHENHYDRAMINE HCL 25 MG
25 CAPSULE ORAL
Status: CANCELLED | OUTPATIENT
Start: 2018-01-09

## 2018-01-09 RX ORDER — HYDROCODONE BITARTRATE AND ACETAMINOPHEN 500; 5 MG/1; MG/1
TABLET ORAL ONCE
Status: COMPLETED | OUTPATIENT
Start: 2018-01-09 | End: 2018-01-09

## 2018-01-09 RX ORDER — DIPHENHYDRAMINE HCL 25 MG
25 CAPSULE ORAL
Status: COMPLETED | OUTPATIENT
Start: 2018-01-09 | End: 2018-01-09

## 2018-01-09 RX ORDER — ACETAMINOPHEN 325 MG/1
650 TABLET ORAL
Status: CANCELLED | OUTPATIENT
Start: 2018-01-09

## 2018-01-09 RX ORDER — ACETAMINOPHEN 325 MG/1
650 TABLET ORAL
Status: COMPLETED | OUTPATIENT
Start: 2018-01-09 | End: 2018-01-09

## 2018-01-09 RX ORDER — SODIUM CHLORIDE 0.9 % (FLUSH) 0.9 %
10 SYRINGE (ML) INJECTION
Status: SHIPPED | OUTPATIENT
Start: 2018-01-09

## 2018-01-09 RX ORDER — HYDROCODONE BITARTRATE AND ACETAMINOPHEN 500; 5 MG/1; MG/1
TABLET ORAL ONCE
Status: CANCELLED | OUTPATIENT
Start: 2018-01-09 | End: 2018-01-09

## 2018-01-09 RX ADMIN — ACETAMINOPHEN 650 MG: 325 TABLET ORAL at 02:01

## 2018-01-09 RX ADMIN — DIPHENHYDRAMINE HYDROCHLORIDE 25 MG: 25 CAPSULE ORAL at 02:01

## 2018-01-09 RX ADMIN — SODIUM CHLORIDE: 0.9 INJECTION, SOLUTION INTRAVENOUS at 01:01

## 2018-01-10 NOTE — PLAN OF CARE
Problem: Anemia (Adult)  Goal: Identify Related Risk Factors and Signs and Symptoms  Related risk factors and signs and symptoms are identified upon initiation of Human Response Clinical Practice Guideline (CPG)   Outcome: Ongoing (interventions implemented as appropriate)  Pt here for  1 unit of blood and 1 unit of platelets. VSS..Consent/labs/meds/allergies/treatment reviewed.  Accessed per flowsheet.  All questions asked were answered. Will Continue to monitor

## 2018-01-10 NOTE — PLAN OF CARE
Problem: Patient Care Overview  Goal: Plan of Care Review  Outcome: Ongoing (interventions implemented as appropriate)  Patient received  1 unit of blood and 1 unit of platelets without any issues. Notified to call MD with any further concerns . Vss. No apparent distress noted.

## 2018-01-11 ENCOUNTER — ANESTHESIA EVENT (OUTPATIENT)
Dept: SURGERY | Facility: HOSPITAL | Age: 83
End: 2018-01-11
Payer: MEDICARE

## 2018-01-11 ENCOUNTER — SURGERY (OUTPATIENT)
Age: 83
End: 2018-01-11

## 2018-01-11 ENCOUNTER — HOSPITAL ENCOUNTER (OUTPATIENT)
Facility: HOSPITAL | Age: 83
Discharge: HOME | End: 2018-01-11
Attending: INTERNAL MEDICINE | Admitting: INTERNAL MEDICINE
Payer: MEDICARE

## 2018-01-11 ENCOUNTER — ANESTHESIA (OUTPATIENT)
Dept: SURGERY | Facility: HOSPITAL | Age: 83
End: 2018-01-11
Payer: MEDICARE

## 2018-01-11 VITALS
OXYGEN SATURATION: 100 % | WEIGHT: 120 LBS | DIASTOLIC BLOOD PRESSURE: 83 MMHG | HEART RATE: 71 BPM | HEIGHT: 65 IN | TEMPERATURE: 98 F | SYSTOLIC BLOOD PRESSURE: 193 MMHG | BODY MASS INDEX: 19.99 KG/M2 | RESPIRATION RATE: 18 BRPM

## 2018-01-11 DIAGNOSIS — D46.9 MDS (MYELODYSPLASTIC SYNDROME): ICD-10-CM

## 2018-01-11 LAB — BONE MARROW WRIGHT STAIN COMMENT: NORMAL

## 2018-01-11 PROCEDURE — 25000003 PHARM REV CODE 250: Performed by: ANESTHESIOLOGY

## 2018-01-11 PROCEDURE — 88313 SPECIAL STAINS GROUP 2: CPT

## 2018-01-11 PROCEDURE — 88299 UNLISTED CYTOGENETIC STUDY: CPT

## 2018-01-11 PROCEDURE — 25000003 PHARM REV CODE 250: Performed by: NURSE ANESTHETIST, CERTIFIED REGISTERED

## 2018-01-11 PROCEDURE — 88185 FLOWCYTOMETRY/TC ADD-ON: CPT | Mod: 59 | Performed by: PATHOLOGY

## 2018-01-11 PROCEDURE — 88341 IMHCHEM/IMCYTCHM EA ADD ANTB: CPT | Mod: 26,59,, | Performed by: PATHOLOGY

## 2018-01-11 PROCEDURE — 88184 FLOWCYTOMETRY/ TC 1 MARKER: CPT | Performed by: PATHOLOGY

## 2018-01-11 PROCEDURE — 88264 CHROMOSOME ANALYSIS 20-25: CPT

## 2018-01-11 PROCEDURE — 88305 TISSUE EXAM BY PATHOLOGIST: CPT | Mod: 26,,, | Performed by: PATHOLOGY

## 2018-01-11 PROCEDURE — 36000705 HC OR TIME LEV I EA ADD 15 MIN: Performed by: INTERNAL MEDICINE

## 2018-01-11 PROCEDURE — 36415 COLL VENOUS BLD VENIPUNCTURE: CPT

## 2018-01-11 PROCEDURE — D9220A PRA ANESTHESIA: Mod: ANES,,, | Performed by: ANESTHESIOLOGY

## 2018-01-11 PROCEDURE — 71000044 HC DOSC ROUTINE RECOVERY FIRST HOUR: Performed by: INTERNAL MEDICINE

## 2018-01-11 PROCEDURE — 88271 CYTOGENETICS DNA PROBE: CPT | Mod: 59

## 2018-01-11 PROCEDURE — 38221 DX BONE MARROW BIOPSIES: CPT | Mod: LT,,, | Performed by: NURSE PRACTITIONER

## 2018-01-11 PROCEDURE — 71000015 HC POSTOP RECOV 1ST HR: Performed by: INTERNAL MEDICINE

## 2018-01-11 PROCEDURE — 88275 CYTOGENETICS 100-300: CPT | Mod: 59

## 2018-01-11 PROCEDURE — 25000003 PHARM REV CODE 250: Performed by: INTERNAL MEDICINE

## 2018-01-11 PROCEDURE — 88311 DECALCIFY TISSUE: CPT | Mod: 26,,, | Performed by: PATHOLOGY

## 2018-01-11 PROCEDURE — 85097 BONE MARROW INTERPRETATION: CPT | Mod: ,,, | Performed by: PATHOLOGY

## 2018-01-11 PROCEDURE — 36000704 HC OR TIME LEV I 1ST 15 MIN: Performed by: INTERNAL MEDICINE

## 2018-01-11 PROCEDURE — 88305 TISSUE EXAM BY PATHOLOGIST: CPT | Performed by: PATHOLOGY

## 2018-01-11 PROCEDURE — 37000008 HC ANESTHESIA 1ST 15 MINUTES: Performed by: INTERNAL MEDICINE

## 2018-01-11 PROCEDURE — 88275 CYTOGENETICS 100-300: CPT

## 2018-01-11 PROCEDURE — 88237 TISSUE CULTURE BONE MARROW: CPT

## 2018-01-11 PROCEDURE — D9220A PRA ANESTHESIA: Mod: CRNA,,, | Performed by: NURSE ANESTHETIST, CERTIFIED REGISTERED

## 2018-01-11 PROCEDURE — 37000009 HC ANESTHESIA EA ADD 15 MINS: Performed by: INTERNAL MEDICINE

## 2018-01-11 PROCEDURE — 88189 FLOWCYTOMETRY/READ 16 & >: CPT | Mod: ,,, | Performed by: PATHOLOGY

## 2018-01-11 PROCEDURE — 81450 HL NEO GSAP 5-50DNA/DNA&RNA: CPT

## 2018-01-11 PROCEDURE — 88342 IMHCHEM/IMCYTCHM 1ST ANTB: CPT | Mod: 26,59,, | Performed by: PATHOLOGY

## 2018-01-11 PROCEDURE — 63600175 PHARM REV CODE 636 W HCPCS: Performed by: NURSE ANESTHETIST, CERTIFIED REGISTERED

## 2018-01-11 PROCEDURE — 88313 SPECIAL STAINS GROUP 2: CPT | Mod: 26,,, | Performed by: PATHOLOGY

## 2018-01-11 RX ORDER — SODIUM CHLORIDE 9 MG/ML
INJECTION, SOLUTION INTRAVENOUS CONTINUOUS
Status: DISCONTINUED | OUTPATIENT
Start: 2018-01-11 | End: 2018-01-11 | Stop reason: HOSPADM

## 2018-01-11 RX ORDER — LIDOCAINE HYDROCHLORIDE 10 MG/ML
1 INJECTION, SOLUTION EPIDURAL; INFILTRATION; INTRACAUDAL; PERINEURAL ONCE
Status: DISCONTINUED | OUTPATIENT
Start: 2018-01-11 | End: 2018-01-11 | Stop reason: HOSPADM

## 2018-01-11 RX ORDER — LIDOCAINE HYDROCHLORIDE 10 MG/ML
INJECTION, SOLUTION EPIDURAL; INFILTRATION; INTRACAUDAL; PERINEURAL
Status: DISCONTINUED | OUTPATIENT
Start: 2018-01-11 | End: 2018-01-11 | Stop reason: HOSPADM

## 2018-01-11 RX ORDER — PROPOFOL 10 MG/ML
VIAL (ML) INTRAVENOUS CONTINUOUS PRN
Status: DISCONTINUED | OUTPATIENT
Start: 2018-01-11 | End: 2018-01-11

## 2018-01-11 RX ORDER — SODIUM CHLORIDE 0.9 % (FLUSH) 0.9 %
3 SYRINGE (ML) INJECTION
Status: DISCONTINUED | OUTPATIENT
Start: 2018-01-11 | End: 2018-01-11 | Stop reason: HOSPADM

## 2018-01-11 RX ORDER — LABETALOL HYDROCHLORIDE 5 MG/ML
INJECTION, SOLUTION INTRAVENOUS
Status: DISCONTINUED | OUTPATIENT
Start: 2018-01-11 | End: 2018-01-11

## 2018-01-11 RX ORDER — PROPOFOL 10 MG/ML
VIAL (ML) INTRAVENOUS
Status: DISCONTINUED | OUTPATIENT
Start: 2018-01-11 | End: 2018-01-11

## 2018-01-11 RX ADMIN — LABETALOL HYDROCHLORIDE 10 MG: 5 INJECTION, SOLUTION INTRAVENOUS at 12:01

## 2018-01-11 RX ADMIN — SODIUM CHLORIDE: 0.9 INJECTION, SOLUTION INTRAVENOUS at 11:01

## 2018-01-11 RX ADMIN — PROPOFOL 125 MCG/KG/MIN: 10 INJECTION, EMULSION INTRAVENOUS at 11:01

## 2018-01-11 RX ADMIN — PROPOFOL 30 MG: 10 INJECTION, EMULSION INTRAVENOUS at 11:01

## 2018-01-11 RX ADMIN — PROPOFOL 20 MG: 10 INJECTION, EMULSION INTRAVENOUS at 11:01

## 2018-01-11 RX ADMIN — LIDOCAINE HYDROCHLORIDE 5 ML: 10 INJECTION, SOLUTION EPIDURAL; INFILTRATION; INTRACAUDAL; PERINEURAL at 11:01

## 2018-01-11 NOTE — TRANSFER OF CARE
"Anesthesia Transfer of Care Note    Patient: Shaar Rose    Procedure(s) Performed: Procedure(s) (LRB):  BIOPSY-BONE MARROW (Left)    Patient location: PACU    Anesthesia Type: general    Transport from OR: Transported from OR on 100% O2 by closed face mask with adequate spontaneous ventilation    Post pain: adequate analgesia    Post assessment: tolerated procedure well and no apparent anesthetic complications    Post vital signs: stable    Level of consciousness: awake and responds to stimulation    Nausea/Vomiting: no nausea/vomiting    Complications: none    Transfer of care protocol was followed      Last vitals:   Visit Vitals  BP (!) 196/86 (BP Location: Left arm, Patient Position: Lying)   Pulse 64   Temp 36.4 °C (97.6 °F) (Oral)   Resp 17   Ht 5' 5" (1.651 m)   Wt 54.4 kg (120 lb)   SpO2 100%   Breastfeeding? No   BMI 19.97 kg/m²     "

## 2018-01-11 NOTE — ANESTHESIA POSTPROCEDURE EVALUATION
"Anesthesia Post Evaluation    Patient: Shara Rose    Procedure(s) Performed: Procedure(s) (LRB):  BIOPSY-BONE MARROW (Left)    Final Anesthesia Type: general  Patient location during evaluation: PACU  Patient participation: Yes- Able to Participate  Level of consciousness: awake and alert  Post-procedure vital signs: reviewed and stable  Pain management: adequate  Airway patency: patent  PONV status at discharge: No PONV  Anesthetic complications: no      Cardiovascular status: blood pressure returned to baseline  Respiratory status: unassisted  Hydration status: euvolemic  Follow-up not needed.        Visit Vitals  BP (!) 193/83 (BP Location: Left arm, Patient Position: Lying)   Pulse 71   Temp 36.4 °C (97.6 °F) (Oral)   Resp 18   Ht 5' 5" (1.651 m)   Wt 54.4 kg (120 lb)   SpO2 100%   Breastfeeding? No   BMI 19.97 kg/m²       Pain/Elliott Score: Pain Assessment Performed: Yes (1/11/2018 10:59 AM)  Presence of Pain: complains of pain/discomfort (1/11/2018 10:59 AM)      "

## 2018-01-11 NOTE — H&P (VIEW-ONLY)
SECTION OF HEMATOLOGY AND BONE MARROW TRANSPLANT  Return Patient Visit   12/25/2017    CHIEF COMPLAINT:   No chief complaint on file.      HISTORY OF PRESENT ILLNESS:   Shara Rose is a 82 y.o. female who is referred to me July 2017 for a Hematology consultation for further evaluation of anemia. Patient's past medical history includes Sickle Cell Trait, Hypothyroidism, Chronic Kidney Disease.  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  Decision made to proceed with palliative vidaza.  She is now s/p 4 cycles.  She required 2 admissions during her therapy for CAP and a gout flare. She has been dependent on tranfusions of plts and blood since initiation of therapy.    She presents to clinic for fu.  Denies fever, chills, nightsweats, bleeding, brusing, lymphadenopathy, signs/symptoms of splenomegaly.        PAST MEDICAL HISTORY:   Past Medical History:   Diagnosis Date    Allergy     Anemia     Arthritis     Cataract     CKD (chronic kidney disease)     Gout, unspecified     Hypertension     Hypothyroidism     MDS (myelodysplastic syndrome) 8/14/2017    Menopause     PNA (pneumonia) 10/15/2017    Sickle cell trait     Trigger finger        PAST SURGICAL HISTORY:   Past Surgical History:   Procedure Laterality Date    APPENDECTOMY      HEMORRHOID SURGERY      HYSTERECTOMY         PAST SOCIAL HISTORY:   reports that she quit smoking about 45 years ago. She has a 0.25 pack-year smoking history. She has never used smokeless tobacco. She reports that she does not drink alcohol or use drugs.    FAMILY HISTORY:  Family History   Problem Relation Age of Onset    Hypertension Mother     Peripheral vascular disease Father     Heart disease Father     Cataracts Father     Cancer Son     Diabetes Neg Hx     Amblyopia Neg Hx     Blindness Neg Hx     Glaucoma Neg Hx     Macular degeneration Neg Hx     Retinal detachment Neg Hx      "Strabismus Neg Hx        CURRENT MEDICATIONS:   Current Outpatient Prescriptions   Medication Sig    acyclovir (ZOVIRAX) 200 MG capsule Take 2 capsules (400 mg total) by mouth 2 (two) times daily.    allopurinol (ZYLOPRIM) 100 MG tablet Take 1 tablet (100 mg total) by mouth once daily. For gout    AZACITIDINE (VIDAZA INJ) Inject as directed.    ciprofloxacin HCl (CIPRO) 500 MG tablet Take 1 tablet (500 mg total) by mouth 2 (two) times daily.    clotrimazole-betamethasone 1-0.05% (LOTRISONE) cream Apply topically 2 (two) times daily.    colchicine 0.6 mg tablet Take 1 tablet (0.6 mg total) by mouth once daily.    levothyroxine (SYNTHROID) 25 MCG tablet TAKE 1 TABLET EVERY DAY    meclizine (ANTIVERT) 25 mg tablet TK 1 T PO BID FOR 10 DAYS PRF DIZZINESS    ondansetron (ZOFRAN) 8 MG tablet Take 1 tablet (8 mg total) by mouth every 12 (twelve) hours as needed for Nausea.    pantoprazole (PROTONIX) 40 MG tablet Take 1 tablet (40 mg total) by mouth once daily. While on prednisone    valsartan (DIOVAN) 160 MG tablet Take 1 tablet (160 mg total) by mouth once daily.    verapamil (CALAN-SR) 240 MG CR tablet TAKE 1 TABLET EVERY DAY     No current facility-administered medications for this visit.      ALLERGIES:   Review of patient's allergies indicates:   Allergen Reactions    Sulfa (sulfonamide antibiotics) Itching and Rash           REVIEW OF SYSTEMS:   Review of Systems - General ROS: negative  Psychological ROS: negative  Ophthalmic ROS: negative  Allergy and Immunology ROS: negative  Breast ROS: negative  Respiratory ROS: negative  Cardiovascular ROS: negative  Gastrointestinal ROS: negative  Genito-Urinary ROS: negative  Musculoskeletal ROS: negative  Neurological ROS: negative  Dermatological ROS: negative    PHYSICAL EXAM:   Vitals:    12/22/17 1527   BP: (!) 192/83   Weight: 55.6 kg (122 lb 9.2 oz)   Height: 5' 5" (1.651 m)     General - well developed, well nourished, no apparent distress  HEENT - " oropharynx clear  Chest and Lung - clear to auscultation bilaterally   Cardiovascular - RRR with no MGR, normal S1 and S2  Abdomen-  soft, nontender, no palpable hepatomegaly or splenomegaly  Lymph - no palpable lymphadenopathy  Heme - no bruising, petechiae, pallor  Skin - no rashes or lesions  Psych - appropriate mood and affect      ECOG Performance Status: (foot note - ECOG PS provided by Eastern Cooperative [employer]cology Group) 1 - Symptomatic but completely ambulatory    Karnofsky Performance Score:  80%- Normal Activity with Effort: Some Symptoms of Disease  DATA:   Lab Results   Component Value Date    WBC 1.07 (LL) 12/22/2017    HGB 7.1 (L) 12/22/2017    HCT 21.2 (L) 12/22/2017    MCV 86 12/22/2017    PLT 1 (LL) 12/22/2017     Gran #   Date Value Ref Range Status   12/15/2017 0.1 (L) 1.8 - 7.7 K/uL Final     Gran%   Date Value Ref Range Status   12/22/2017 2.0 (L) 38.0 - 73.0 % Corrected     Comment:     Corrected result; previously reported as 5.7 on %DDDDDDDD% at %TTT%.     Lymph #   Date Value Ref Range Status   12/15/2017 0.9 (L) 1.0 - 4.8 K/uL Final     Lymph%   Date Value Ref Range Status   12/22/2017 94.0 (H) 18.0 - 48.0 % Corrected     Comment:     Corrected result; previously reported as 91.6 on %DDDDDDDD% at %TTT%.     CMP  Sodium   Date Value Ref Range Status   12/12/2017 142 136 - 145 mmol/L Final     Potassium   Date Value Ref Range Status   12/12/2017 3.8 3.5 - 5.1 mmol/L Final     Chloride   Date Value Ref Range Status   12/12/2017 109 95 - 110 mmol/L Final     CO2   Date Value Ref Range Status   12/12/2017 24 23 - 29 mmol/L Final     Glucose   Date Value Ref Range Status   12/12/2017 85 70 - 110 mg/dL Final     BUN, Bld   Date Value Ref Range Status   12/12/2017 15 8 - 23 mg/dL Final     Creatinine   Date Value Ref Range Status   12/12/2017 1.0 0.5 - 1.4 mg/dL Final     Calcium   Date Value Ref Range Status   12/12/2017 9.4 8.7 - 10.5 mg/dL Final     Total Protein   Date Value Ref Range Status    12/12/2017 7.7 6.0 - 8.4 g/dL Final     Albumin   Date Value Ref Range Status   12/12/2017 2.9 (L) 3.5 - 5.2 g/dL Final     Total Bilirubin   Date Value Ref Range Status   12/12/2017 3.1 (H) 0.1 - 1.0 mg/dL Final     Comment:     For infants and newborns, interpretation of results should be based  on gestational age, weight and in agreement with clinical  observations.  Premature Infant recommended reference ranges:  Up to 24 hours.............<8.0 mg/dL  Up to 48 hours............<12.0 mg/dL  3-5 days..................<15.0 mg/dL  6-29 days.................<15.0 mg/dL       Alkaline Phosphatase   Date Value Ref Range Status   12/12/2017 103 55 - 135 U/L Final     AST   Date Value Ref Range Status   12/12/2017 16 10 - 40 U/L Final     ALT   Date Value Ref Range Status   12/12/2017 7 (L) 10 - 44 U/L Final     Anion Gap   Date Value Ref Range Status   12/12/2017 9 8 - 16 mmol/L Final     eGFR if    Date Value Ref Range Status   12/12/2017 >60.0 >60 mL/min/1.73 m^2 Final     eGFR if non    Date Value Ref Range Status   12/12/2017 53.0 (A) >60 mL/min/1.73 m^2 Final     Comment:     Calculation used to obtain the estimated glomerular filtration  rate (eGFR) is the CKD-EPI equation.        LD   Date Value Ref Range Status   08/03/2017 184 110 - 260 U/L Final     Comment:     Results are increased in hemolyzed samples.     Lab Results   Component Value Date    IRON 122 08/03/2017    TIBC 209 (L) 08/03/2017    FERRITIN 576 (H) 08/03/2017     Lab Results   Component Value Date    LCKWVZBZ65 362 08/03/2017     Lab Results   Component Value Date    FOLATE 35.2 (H) 08/03/2017   Pathologist Interpretation TAHIR   Pathologist Interpretation TAHIR   Collected: 08/03/17 1522   Resulting lab: [facility]   Value: REVIEWED   Comment: Electronically reviewed and signed by:   Rhianna Mao MD   Signed on 08/04/17 at 13:32   There is a very faint IgA kappa monoclonal band in  beta. - SPEP negative     HEMOGLOBIN ELECTROPHORESIS,HGB A2 UGO.   Order: [id number]   Status:  Final result   Visible to patient:  No (Not Released) Next appt:  None Dx:  Anemia     Ref Range & Units 3yr ago 6yr ago    Hgb A2 Quant 1.5 - 3.8 % 2.4 2.2CM    Hemoglobin Bands   Hb A , Hb S and Hb A2 textCM    Hemoglobin Electrophoresis Interp   See comment    Comments: Hemoglobins A and S are present, measuring approximately 62% and 37%   respectively.  This pattern is compatible with the patient's history   of sickle cell trait, provided the patient has no recent history of   red cell transfusion.  Correlate clinically.   Interpreted by Rhianna Mao M.D.                  Bone Marrow biopsy - 8/7/17  SPECIMEN  1) Bone marrow clot, left iliac crest.  2) Bone marrow core biopsy, left iliac crest.  3) Bone Marrow Aspirate (Slides)  FINAL PATHOLOGIC DIAGNOSIS  LEFT ILIAC CREST BONE MARROW ASPIRATE SMEAR, CLOT SECTION, AND CORE BIOPSY:  -- HYPERCELLULAR MARROW FOR AGE (70%) WITH ERYTHROID HYPERPLASIA, RING SIDEROBLASTS  AND ATYPICAL MEGAKARYOCYTIC PROLIFERATION, HIGHLY SUGGESTIVE OF A MYELODYSPLASTIC  SYNDROME.  -- INCREASED IRON STORAGE.  -- SEE COMMENT.  COMMENT:  CBC data (8/3/2017): WBC 2.31 (granulocyte 22.9%, lymphocyte 55%, monocyte 5.2%, eosinophil 16.9%), RBC  2.66, HGB 8.5, HCT 24.7, MCV 93, MCHC 34.4, PLT 24.  Peripheral blood smear is not submitted for review.  Flow cytometric analysis of bone marrow shows populations of polyclonal B lymphocytes and T lymphocytes that  are immunophenotypically unremarkable. The blast gate is not increased.  Immunohistochemical stains are performed on the biopsy core for greater sensitivity and further architectural  assessment with adequate controls. CD34 highlights rare immature mononuclear cells. The early erythroid  precursors are positive for E-cadherin. CD61 highlights markedly increased megakaryocytes with frequent small  forms. The small lymphoid aggregates are  composed of a mixture of CD3- positive T-cells and CD20-positive  B-cells.  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  Correlation with cytogenetics is critical. Close clinical follow-up is required.  Diagnosed by: Gio Prado  (Electronically Signed: 2017-08-10 15:02:16)  Microscopic Examination  BONE MARROW ASPIRATE DIFFERENTIAL:  Blasts----------------------------------------------1%  Promyelocytes---------------------------------0%  Myelocytes--------------------------------------3%  Metamyelocytes------------------------------ 5%  Bands----------------------------------------------4%  PMN Neutrophils------------------------------6%  Eosinophils------------------------------------------2%  Basophils---------------------------------------0%  Monocytes------------------------------------1%  Lymphocytes-------------------------------------31%  Plasma cells------------------------------------------1%  Erythroid precursors--------------------------47%  BONE MARROW ASPIRATE SMEAR:  The smears contain cellular spicules. The myeloid to erythroid ratio is markedly decreased, approximately 0.5:1.  Some erythroid precursors display megaloblastoid change and a shift towards immaturity. Blasts are not increased.  Megakaryocytes are increased and display frequent atypical nuclear features including hypolobation and small forms  as well as occasional forms with  nuclei. Stainable iron is increased. Ring sideroblasts are seen,  approximately 30-35% of the erythroid precursors.  BONE MARROW CLOT SECTION:  The clot sections contain small spicules with cellular composition that is similar to the biopsy core. Stainable iron is  present.  BONE MARROW CORE BIOPSY:  Cortical and medullary bones are present. The cellularity is approximately 70%. The myeloid  to erythroid ratio is  markedly decreased. Megakaryocytes are increased and form small clusters in areas. Small lymphoid aggregates  composed of small mature lymphocytes are seen. Stainable iron is present.  ASSESSMENT AND PLAN:   Encounter Diagnoses   Name Primary?   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 ; Int 2 IPSS; Very high risk R-IPSS  -not a stem cell transplant candidate due to age/comorbidities  -discussed terminal nature of diagnosis and recommendations for hypomethylating agent therapy  -discussed risks of disease associated with bone marrow failure and transformation to AML  -can consider addition of GASTON if no response in anemia to vidaza   -pancytopenia due to MDS and vidaza ; suspect anemia augmented buy underlying sickle trait ;  transfuse for plt >10, hgb >7; plan for 1 unit prbc and 1 unit plt today; given transfusion requirements plan for twice weekly labs and prn transfusions   -she has now completed 4 cycles of vidaza (last 11/28/17) ; plan for repeat bone marrow biopsy for first available under sedation (patient requesting sedation); pending results of marrow may resume vidaza with cycle #5   -reviewed neutropenic and bleeding precautions with patient  -prn zofran for home use for CINV  -plan for   -for MGUS; anemia likely due to MDS; no hypercalcemia, Cr at baseline; will discuss skeletal survey at next appt but lack of clonal plasma cells in bone marrow make underlying myeloma highly unlikely    2)ID  -completed course of levaquin for CAP on 10/25/17  -transition back to cipro ppx  -contineu acyc, fluc ppx     3)gout  -continue colchicine; improving     -continue all other chronic medications per pcp     Follow Up:  -q tues/friday cbc, cmp, type and screen (10am-11am as only time she can get rides) for next 4   weeks   -first available bone marrow biopsy under sedation   -same labs, MD appt, chair  for 7 days of vidaza injections in 4 weeks      Ankit Wang MD  Hematology/Oncology/Bone Marrow Transplant

## 2018-01-11 NOTE — DISCHARGE INSTRUCTIONS
Discharge instructions for having a Bone Marrow Aspiration / Biopsy:    Keep Bandage in place for 24 hours.  - Do not shower or take a tube bath during this time (you may sponge bathe).  - Call the nurse or physician for excessive bleeding or pain at biopsy site.  - You may take Tylenol as needed for pain.    You have received medication to sedate you.  - Do not drive a car or operate heavy machinery for the rest of the day.  - You may resume other activities as tolerated.    You can call [phone] for any problems during the hours of 8:00 AM-5:00PM.    For an emergency after 5:00 PM you can call [phone] and have the  page the Hematologist / Oncologist on call.

## 2018-01-11 NOTE — PROCEDURES
PROCEDURE NOTE:  Date of Procedure: 01/11/2018  Bone Marrow Biopsy and Aspiration  Indication: MDS, pt on vidaza  Consent: Informed consent was obtained from patient.  Timeout: Done and documented.  Position: Right lateral  Site: Left posterior illiac crest.  Prep: Betadine.  Needle used: 11 gauge Jamshidi needle.  Anesthetic: 1% lidocaine 5 cc.  Biopsy: The biopsy needle was introduced into the marrow cavity and an aspirate was obtained without complications and sent for flow cytometry, MDS FISH, DNA/RNA hold, and cytogenetics. Two small core biopsies obtained without difficulty and sent for routine histologic examination.  Complications: None.  Disposition: The patient was discharged home per anesthesia protocol.  Blood loss: Minimal.     Nidhi Bai DNP, NP  Hematology/Oncology

## 2018-01-11 NOTE — PROGRESS NOTES
Notified Dr. Daley of patient's high BP of 182/84. No new orders given.  Anesthesia consent signed.  IV placed per CRNA. Patient transported to the OR.

## 2018-01-11 NOTE — PROGRESS NOTES
Report given to Francine SHAFFER. Pt needs: IV, IVF and lidocaine scanned, anesthesia consent, and to notify Dr Lockwood of pt BP: 182/84.

## 2018-01-11 NOTE — PLAN OF CARE
Problem: Patient Care Overview  Goal: Plan of Care Review  Patient tolerating oral liquids without difficulty. No apparent s&s of distress noted at this time, no complaints voiced at this time. Discharge instructions reviewed with patient/family/friend with good verbal feedback received. Patient ready for discharge

## 2018-01-11 NOTE — ANESTHESIA PREPROCEDURE EVALUATION
2018  Shara Rose is a 82 y.o., female.  Pre-operative evaluation for BIOPSY-BONE MARROW (Left)    Chief Complaint:    PMH:  Patient Active Problem List   Diagnosis    Left hip pain    Nuclear sclerosis    Hypertension    Hypothyroidism    Stage 2 chronic kidney disease    MDS (myelodysplastic syndrome)    Neutropenic fever    PNA (pneumonia)    Lung consolidation    Debility    Cellulitis    Acute gout involving toe of right foot    Pancytopenia         Past Surgical History:   Procedure Laterality Date    APPENDECTOMY      HEMORRHOID SURGERY      HYSTERECTOMY           Vital Signs Range (Last 24H):  Temp:  [36.4 °C (97.6 °F)]   Pulse:  [64]   Resp:  [17]   BP: (182)/(84)   SpO2:  [100 %]       CBC:     Recent Labs  Lab 17  1705 12/15/17  1016 17  1411 17  1008 18  1011 18  0959   WBC 0.48* 1.02* 1.07* 0.83* 0.88* 1.10*   RBC 2.93* 2.91* 2.46* 2.75* 2.32* 2.44*   HGB 8.7* 8.6* 7.1* 7.9* 6.7* 7.0*   HCT 25.5* 25.5* 21.2* 23.9* 20.6* 21.1*   PLT 41* 20* 1* 27* 1* 3*   MCV 87 88 86 87 89 87   MCH 29.7 29.6 28.9 28.7 28.9 28.7   MCHC 34.1 33.7 33.5 33.1 32.5 33.2       CMP:   Recent Labs  Lab 18  1011 18  0959    141   K 3.7 3.7    109   CO2 25 24   BUN 14 15   CREATININE 1.1 1.0   * 95   CALCIUM 9.4 9.8   ALBUMIN 3.0* 3.1*   PROT 8.0 8.7*   ALKPHOS 145* 168*   ALT 23 14   AST 17 17   BILITOT 0.7 0.9       INR:  No results for input(s): PT, INR, PROTIME, APTT in the last 720 hours.      Diagnostic Studies:      EKD Echo:    Anesthesia Evaluation    I have reviewed the Patient Summary Reports.    I have reviewed the Nursing Notes.   I have reviewed the Medications.     Review of Systems  Anesthesia Hx:  No problems with previous Anesthesia    Social:  Non-Smoker    Hematology/Oncology:     Oncology Normal   Hematology  Comments: Pancytopenia    EENT/Dental:EENT/Dental Normal   Hepatic/GI:  Hepatic/GI Normal    Musculoskeletal:  Musculoskeletal Normal    Neurological:  Neurology Normal    Dermatological:  Skin Normal    Psych:  Psychiatric Normal           Physical Exam  General:  Well nourished    Airway/Jaw/Neck:  Airway Findings: Mouth Opening: Normal Tongue: Normal  General Airway Assessment: Adult  Mallampati: II  Improves to II with phonation.  TM Distance: Normal, at least 6 cm      Dental:  Dental Findings:   Chest/Lungs:  Chest/Lungs Findings: Clear to auscultation     Heart/Vascular:  Heart Findings: Rate: Normal  Rhythm: Regular Rhythm  Sounds: Normal        Mental Status:  Mental Status Findings:  Cooperative, Alert and Oriented         Anesthesia Plan  Type of Anesthesia, risks & benefits discussed:  Anesthesia Type:  general  Patient's Preference:   Intra-op Monitoring Plan:   Intra-op Monitoring Plan Comments:   Post Op Pain Control Plan:   Post Op Pain Control Plan Comments:   Induction:   IV  Beta Blocker:  Patient is not currently on a Beta-Blocker (No further documentation required).       Informed Consent: Patient understands risks and agrees with Anesthesia plan.  Questions answered. Anesthesia consent signed with patient.  ASA Score: 3     Day of Surgery Review of History & Physical:            Ready For Surgery From Anesthesia Perspective.

## 2018-01-11 NOTE — DISCHARGE SUMMARY
[facility]  Hematology/Oncology  Discharge Summary      Patient Name: Shara Rose  MRN: [id number]  Admission Date: 1/11/2018  Hospital Length of Stay: 0 days  Discharge Date and Time:  01/11/2018 12:05 PM  Attending Physician: Juan Wang MD   Discharging Provider: Nidhi Bai NP  Primary Care Provider: Alana Carter MD      Procedure(s) (LRB):  BIOPSY-BONE MARROW (Left)     Hospital Course: Patient admitted to pre op today for a bone marrow aspiration and biopsy. Pt was consented for a bone marrow biopsy. Patient was sedated per anesthesia and a bone marrow biopsy and aspiration was performed in the OR (see procedure note). Patient was then transferred to post op and discharged home when appropriate per anesthesia.     Pending Diagnostic Studies:     Procedure Component Value Units Date/Time    Bone Marrow Prep and Stain [[id number]] Collected:  01/11/18 1023    Order Status:  Sent Lab Status:  In process Updated:  01/11/18 1023    Specimen:  Bone Marrow from Bone Marrow     Chromosome Analysis, Bone Marrow [[id number]] Collected:  01/11/18 1023    Order Status:  Sent Lab Status:  In process Updated:  01/11/18 1023    Specimen:  Bone Marrow from Bone Marrow     Heme Disorders DNA/RNA Hold, Bone Marrow [[id number]] Collected:  01/11/18 1023    Order Status:  Sent Lab Status:  In process Updated:  01/11/18 1023    Specimen:  Bone Marrow from Bone Marrow     Iron Stain, Bone Marrow [[id number]] Collected:  01/11/18 1023    Order Status:  Sent Lab Status:  In process Updated:  01/11/18 1023    Specimen:  Bone Marrow from Bone Marrow     Leukemia/Lymphoma Screen - Bone Marrow Left Posterior Iliac Crest [[id number]] Collected:  01/11/18 1023    Order Status:  Sent Lab Status:  In process Updated:  01/11/18 1023    Specimen:  Bone Marrow     Myelodysplastic Syndrome (MDS), FISH, Bone Marrow [[id number]] Collected:  01/11/18 1023    Order Status:  Sent Lab Status:  In process Updated:   01/11/18 1023    Specimen:  Bone Marrow from Bone Marrow         Final Active Diagnoses:    Diagnosis Date Noted POA    MDS (myelodysplastic syndrome) [D46.9] 08/14/2017 Yes      Problems Resolved During this Admission:    Diagnosis Date Noted Date Resolved POA      Discharged Condition: good    Disposition: Home or Self Care    Follow Up: with Dr. Wang in BMT clinic    Patient Instructions:     Activity as tolerated     Notify your health care provider if you experience any of the following:  temperature >100.4     Notify your health care provider if you experience any of the following:  severe uncontrolled pain     Notify your health care provider if you experience any of the following:  redness, tenderness, or signs of infection (pain, swelling, redness, odor or green/yellow discharge around incision site)     Remove dressing in 24 hours       Medications:  Reconciled Home Medications:   Current Discharge Medication List      CONTINUE these medications which have NOT CHANGED    Details   allopurinol (ZYLOPRIM) 100 MG tablet Take 1 tablet (100 mg total) by mouth once daily. For gout  Qty: 30 tablet, Refills: 2      AZACITIDINE (VIDAZA INJ) Inject as directed.      levothyroxine (SYNTHROID) 25 MCG tablet TAKE 1 TABLET EVERY DAY  Qty: 90 tablet, Refills: 4      ondansetron (ZOFRAN) 8 MG tablet Take 1 tablet (8 mg total) by mouth every 12 (twelve) hours as needed for Nausea.  Qty: 30 tablet, Refills: 2    Associated Diagnoses: CINV (chemotherapy-induced nausea and vomiting)      valsartan (DIOVAN) 160 MG tablet Take 1 tablet (160 mg total) by mouth once daily.  Qty: 30 tablet, Refills: 2      verapamil (CALAN-SR) 240 MG CR tablet TAKE 1 TABLET EVERY DAY  Qty: 90 tablet, Refills: 3      acyclovir (ZOVIRAX) 200 MG capsule Take 2 capsules (400 mg total) by mouth 2 (two) times daily.  Qty: 120 capsule, Refills: 11      ciprofloxacin HCl (CIPRO) 500 MG tablet Take 1 tablet (500 mg total) by mouth 2 (two) times  daily.  Qty: 60 tablet, Refills: 0      clotrimazole-betamethasone 1-0.05% (LOTRISONE) cream Apply topically 2 (two) times daily.  Qty: 45 g, Refills: 3      colchicine 0.6 mg tablet Take 1 tablet (0.6 mg total) by mouth once daily.  Qty: 10 tablet, Refills: 0      meclizine (ANTIVERT) 25 mg tablet TK 1 T PO BID FOR 10 DAYS PRF DIZZINESS  Refills: 1      pantoprazole (PROTONIX) 40 MG tablet Take 1 tablet (40 mg total) by mouth once daily. While on prednisone  Qty: 7 tablet, Refills: 0             Nidhi Bai NP  Hematology/Oncology  [facility]

## 2018-01-11 NOTE — PROGRESS NOTES
Nidhi spoke to pt: reinforced that pt can not drive home today after procedure if sedation is used; pt wants procedure with sedation. Pt found a ride home from her granddaughter: [employer]: [phone]. Pt changing into hospital gown now.

## 2018-01-11 NOTE — PROGRESS NOTES
Pt states she thought she could drive herself home today after procedure. States she does not have a ride home and has nobody she can call. To notify Nidhi. Pt sitting at bs in chair until we speak to Nidhi. Pre op questions to begin.

## 2018-01-12 ENCOUNTER — LAB VISIT (OUTPATIENT)
Dept: LAB | Facility: HOSPITAL | Age: 83
End: 2018-01-12
Attending: INTERNAL MEDICINE
Payer: MEDICARE

## 2018-01-12 DIAGNOSIS — D47.2 MONOCLONAL GAMMOPATHY OF UNDETERMINED SIGNIFICANCE: ICD-10-CM

## 2018-01-12 DIAGNOSIS — D46.9 MYELODYSPLASTIC SYNDROME: ICD-10-CM

## 2018-01-12 LAB
ABO + RH BLD: NORMAL
ALBUMIN SERPL BCP-MCNC: 3.1 G/DL
ALP SERPL-CCNC: 199 U/L
ALT SERPL W/O P-5'-P-CCNC: 10 U/L
ANION GAP SERPL CALC-SCNC: 7 MMOL/L
ANISOCYTOSIS BLD QL SMEAR: SLIGHT
AST SERPL-CCNC: 17 U/L
BASOPHILS # BLD AUTO: 0 K/UL
BASOPHILS NFR BLD: 0 %
BILIRUB SERPL-MCNC: 0.3 MG/DL
BLD GP AB SCN CELLS X3 SERPL QL: NORMAL
BONE MARROW IRON STAIN COMMENT: NORMAL
BUN SERPL-MCNC: 17 MG/DL
CALCIUM SERPL-MCNC: 9.9 MG/DL
CHLORIDE SERPL-SCNC: 108 MMOL/L
CO2 SERPL-SCNC: 29 MMOL/L
CREAT SERPL-MCNC: 1.1 MG/DL
DIFFERENTIAL METHOD: ABNORMAL
EOSINOPHIL # BLD AUTO: 0 K/UL
EOSINOPHIL NFR BLD: 2.3 %
ERYTHROCYTE [DISTWIDTH] IN BLOOD BY AUTOMATED COUNT: 13.2 %
EST. GFR  (AFRICAN AMERICAN): 54.4 ML/MIN/1.73 M^2
EST. GFR  (NON AFRICAN AMERICAN): 47.2 ML/MIN/1.73 M^2
GLUCOSE SERPL-MCNC: 110 MG/DL
HCT VFR BLD AUTO: 23.1 %
HGB BLD-MCNC: 7.7 G/DL
HYPOCHROMIA BLD QL SMEAR: ABNORMAL
IMM GRANULOCYTES # BLD AUTO: 0.01 K/UL
IMM GRANULOCYTES NFR BLD AUTO: 1.2 %
LYMPHOCYTES # BLD AUTO: 0.5 K/UL
LYMPHOCYTES NFR BLD: 54.7 %
MCH RBC QN AUTO: 29.3 PG
MCHC RBC AUTO-ENTMCNC: 33.3 G/DL
MCV RBC AUTO: 88 FL
MONOCYTES # BLD AUTO: 0.1 K/UL
MONOCYTES NFR BLD: 10.5 %
NEUTROPHILS # BLD AUTO: 0.3 K/UL
NEUTROPHILS NFR BLD: 31.3 %
NRBC BLD-RTO: 0 /100 WBC
PLATELET # BLD AUTO: 28 K/UL
PLATELET BLD QL SMEAR: ABNORMAL
PMV BLD AUTO: 10.1 FL
POLYCHROMASIA BLD QL SMEAR: ABNORMAL
POTASSIUM SERPL-SCNC: 3.7 MMOL/L
PROT SERPL-MCNC: 8.3 G/DL
RBC # BLD AUTO: 2.63 M/UL
SODIUM SERPL-SCNC: 144 MMOL/L
WBC # BLD AUTO: 0.86 K/UL

## 2018-01-12 PROCEDURE — 86901 BLOOD TYPING SEROLOGIC RH(D): CPT

## 2018-01-12 PROCEDURE — 85025 COMPLETE CBC W/AUTO DIFF WBC: CPT

## 2018-01-12 PROCEDURE — 80053 COMPREHEN METABOLIC PANEL: CPT

## 2018-01-16 ENCOUNTER — TELEPHONE (OUTPATIENT)
Dept: HEMATOLOGY/ONCOLOGY | Facility: CLINIC | Age: 83
End: 2018-01-16

## 2018-01-16 ENCOUNTER — INFUSION (OUTPATIENT)
Dept: INFUSION THERAPY | Facility: HOSPITAL | Age: 83
End: 2018-01-16
Attending: INTERNAL MEDICINE
Payer: MEDICARE

## 2018-01-16 VITALS
SYSTOLIC BLOOD PRESSURE: 189 MMHG | RESPIRATION RATE: 18 BRPM | OXYGEN SATURATION: 100 % | HEART RATE: 64 BPM | TEMPERATURE: 98 F | DIASTOLIC BLOOD PRESSURE: 79 MMHG

## 2018-01-16 DIAGNOSIS — D46.9 MDS (MYELODYSPLASTIC SYNDROME): ICD-10-CM

## 2018-01-16 DIAGNOSIS — D61.818 PANCYTOPENIA: ICD-10-CM

## 2018-01-16 DIAGNOSIS — D46.9 MDS (MYELODYSPLASTIC SYNDROME): Primary | ICD-10-CM

## 2018-01-16 LAB
BLD PROD TYP BPU: NORMAL
BLD PROD TYP BPU: NORMAL
BLOOD UNIT EXPIRATION DATE: NORMAL
BLOOD UNIT EXPIRATION DATE: NORMAL
BLOOD UNIT TYPE CODE: 5100
BLOOD UNIT TYPE CODE: 9500
BLOOD UNIT TYPE: NORMAL
BLOOD UNIT TYPE: NORMAL
CODING SYSTEM: NORMAL
CODING SYSTEM: NORMAL
DISPENSE STATUS: NORMAL
DISPENSE STATUS: NORMAL
NUM UNITS TRANS PACKED RBC: NORMAL
NUM UNITS TRANS WBC-POOR PLATPHERESIS: NORMAL

## 2018-01-16 PROCEDURE — 86644 CMV ANTIBODY: CPT

## 2018-01-16 PROCEDURE — 36430 TRANSFUSION BLD/BLD COMPNT: CPT

## 2018-01-16 PROCEDURE — 25000003 PHARM REV CODE 250: Performed by: NURSE PRACTITIONER

## 2018-01-16 PROCEDURE — P9040 RBC LEUKOREDUCED IRRADIATED: HCPCS

## 2018-01-16 PROCEDURE — P9037 PLATE PHERES LEUKOREDU IRRAD: HCPCS

## 2018-01-16 PROCEDURE — 86920 COMPATIBILITY TEST SPIN: CPT

## 2018-01-16 RX ORDER — HYDROCODONE BITARTRATE AND ACETAMINOPHEN 500; 5 MG/1; MG/1
TABLET ORAL ONCE
Status: CANCELLED | OUTPATIENT
Start: 2018-01-16 | End: 2018-01-16

## 2018-01-16 RX ORDER — ACETAMINOPHEN 325 MG/1
650 TABLET ORAL
Status: CANCELLED | OUTPATIENT
Start: 2018-01-16

## 2018-01-16 RX ORDER — DIPHENHYDRAMINE HCL 25 MG
25 CAPSULE ORAL
Status: CANCELLED | OUTPATIENT
Start: 2018-01-16

## 2018-01-16 RX ORDER — DIPHENHYDRAMINE HCL 25 MG
25 CAPSULE ORAL
Status: COMPLETED | OUTPATIENT
Start: 2018-01-16 | End: 2018-01-16

## 2018-01-16 RX ORDER — ACETAMINOPHEN 325 MG/1
650 TABLET ORAL
Status: COMPLETED | OUTPATIENT
Start: 2018-01-16 | End: 2018-01-16

## 2018-01-16 RX ORDER — HYDROCODONE BITARTRATE AND ACETAMINOPHEN 500; 5 MG/1; MG/1
TABLET ORAL ONCE
Status: COMPLETED | OUTPATIENT
Start: 2018-01-16 | End: 2018-01-16

## 2018-01-16 RX ADMIN — ACETAMINOPHEN 650 MG: 325 TABLET ORAL at 12:01

## 2018-01-16 RX ADMIN — DIPHENHYDRAMINE HYDROCHLORIDE 25 MG: 25 CAPSULE ORAL at 12:01

## 2018-01-16 RX ADMIN — SODIUM CHLORIDE: 900 INJECTION, SOLUTION INTRAVENOUS at 12:01

## 2018-01-16 NOTE — PLAN OF CARE
Problem: Anemia (Adult)  Goal: Identify Related Risk Factors and Signs and Symptoms  Related risk factors and signs and symptoms are identified upon initiation of Human Response Clinical Practice Guideline (CPG)   Outcome: Ongoing (interventions implemented as appropriate)  Pt here for 1 unit of Plt and 1u PRBC. VSS. No complaints voiced. Consent/labs/meds/allergies reviewed.PIV to left AC with blood return noted. All questions answered. Will continue to monitor.

## 2018-01-16 NOTE — PLAN OF CARE
Problem: Patient Care Overview  Goal: Plan of Care Review  Outcome: Ongoing (interventions implemented as appropriate)  Pt tolerated tx without complications. VSS. No s/s of reaction. AVS given to patient.

## 2018-01-17 LAB
CLINICAL CYTOGENETICIST REVIEW: NORMAL
DNA/RNA EXTRACT AND HOLD RESULT: NORMAL
DNA/RNA EXTRACTION: NORMAL
EXHR SPECIMEN TYPE: NORMAL
FMDS SPECIMEN: NORMAL
MDS FISH ADDITIONAL INFORMATION: NORMAL
MDS FISH DISCLAIMER: NORMAL
MDS FISH REASON FOR REFERRAL (BM): NORMAL
MDS FISH RELEASED BY: NORMAL
MDS FISH RESULT (BM): NORMAL
MDS FISH RESULT SUMMARY: NORMAL
MDS FISH RESULT TABLE: NORMAL
REF LAB TEST METHOD: NORMAL
SPECIMEN SOURCE: NORMAL

## 2018-01-22 LAB
CHROM BANDING METHOD: NORMAL
CHROMOSOME ANALYSIS BM ADDITIONAL INFORMATION: NORMAL
CHROMOSOME ANALYSIS BM RELEASED BY: NORMAL
CHROMOSOME ANALYSIS BM RESULT SUMMARY: NORMAL
CLINICAL CYTOGENETICIST REVIEW: NORMAL
KARYOTYP MAR: NORMAL
REASON FOR REFERRAL (NARRATIVE): NORMAL
REF LAB TEST METHOD: NORMAL
SPECIMEN SOURCE: NORMAL
SPECIMEN: NORMAL

## 2018-01-23 ENCOUNTER — INFUSION (OUTPATIENT)
Dept: INFUSION THERAPY | Facility: HOSPITAL | Age: 83
End: 2018-01-23
Attending: INTERNAL MEDICINE
Payer: MEDICARE

## 2018-01-23 ENCOUNTER — OFFICE VISIT (OUTPATIENT)
Dept: HEMATOLOGY/ONCOLOGY | Facility: CLINIC | Age: 83
End: 2018-01-23
Payer: MEDICARE

## 2018-01-23 VITALS
DIASTOLIC BLOOD PRESSURE: 80 MMHG | SYSTOLIC BLOOD PRESSURE: 188 MMHG | HEART RATE: 59 BPM | RESPIRATION RATE: 16 BRPM | BODY MASS INDEX: 21.19 KG/M2 | OXYGEN SATURATION: 99 % | TEMPERATURE: 98 F | HEIGHT: 65 IN | WEIGHT: 127.19 LBS

## 2018-01-23 DIAGNOSIS — D46.9 MDS (MYELODYSPLASTIC SYNDROME): Primary | ICD-10-CM

## 2018-01-23 DIAGNOSIS — D70.9 NEUTROPENIA, UNSPECIFIED TYPE: Primary | ICD-10-CM

## 2018-01-23 DIAGNOSIS — D47.2 MONOCLONAL GAMMOPATHY OF UNDETERMINED SIGNIFICANCE: ICD-10-CM

## 2018-01-23 DIAGNOSIS — D61.818 PANCYTOPENIA: ICD-10-CM

## 2018-01-23 DIAGNOSIS — D46.9 MYELODYSPLASTIC SYNDROME: ICD-10-CM

## 2018-01-23 PROCEDURE — 99214 OFFICE O/P EST MOD 30 MIN: CPT | Mod: S$GLB,,, | Performed by: INTERNAL MEDICINE

## 2018-01-23 PROCEDURE — 99999 PR PBB SHADOW E&M-EST. PATIENT-LVL III: CPT | Mod: PBBFAC,,, | Performed by: INTERNAL MEDICINE

## 2018-01-23 PROCEDURE — 96401 CHEMO ANTI-NEOPL SQ/IM: CPT

## 2018-01-23 PROCEDURE — 63600175 PHARM REV CODE 636 W HCPCS: Mod: JG | Performed by: INTERNAL MEDICINE

## 2018-01-23 RX ORDER — ALLOPURINOL 100 MG/1
TABLET ORAL
Qty: 90 TABLET | Refills: 2 | OUTPATIENT
Start: 2018-01-23

## 2018-01-23 RX ORDER — AZACITIDINE 100 MG/1
50 INJECTION, POWDER, LYOPHILIZED, FOR SOLUTION INTRAVENOUS; SUBCUTANEOUS
Status: CANCELLED | OUTPATIENT
Start: 2018-01-23

## 2018-01-23 RX ORDER — AZACITIDINE 100 MG/1
50 INJECTION, POWDER, LYOPHILIZED, FOR SOLUTION INTRAVENOUS; SUBCUTANEOUS
Status: CANCELLED | OUTPATIENT
Start: 2018-01-26

## 2018-01-23 RX ORDER — AZACITIDINE 100 MG/1
50 INJECTION, POWDER, LYOPHILIZED, FOR SOLUTION INTRAVENOUS; SUBCUTANEOUS
Status: CANCELLED | OUTPATIENT
Start: 2018-01-25

## 2018-01-23 RX ORDER — AZACITIDINE 100 MG/1
50 INJECTION, POWDER, LYOPHILIZED, FOR SOLUTION INTRAVENOUS; SUBCUTANEOUS
Status: COMPLETED | OUTPATIENT
Start: 2018-01-23 | End: 2018-01-23

## 2018-01-23 RX ORDER — CIPROFLOXACIN 500 MG/1
500 TABLET ORAL 2 TIMES DAILY
Qty: 60 TABLET | Refills: 2 | Status: SHIPPED | OUTPATIENT
Start: 2018-01-23 | End: 2018-04-28 | Stop reason: SDUPTHER

## 2018-01-23 RX ORDER — ACYCLOVIR 200 MG/1
400 CAPSULE ORAL 2 TIMES DAILY
Qty: 120 CAPSULE | Refills: 11 | Status: SHIPPED | OUTPATIENT
Start: 2018-01-23 | End: 2019-01-23

## 2018-01-23 RX ORDER — FLUCONAZOLE 200 MG/1
200 TABLET ORAL DAILY
Qty: 30 TABLET | Refills: 0 | Status: SHIPPED | OUTPATIENT
Start: 2018-01-23 | End: 2018-02-22

## 2018-01-23 RX ORDER — AZACITIDINE 100 MG/1
50 INJECTION, POWDER, LYOPHILIZED, FOR SOLUTION INTRAVENOUS; SUBCUTANEOUS
Status: CANCELLED | OUTPATIENT
Start: 2018-01-27

## 2018-01-23 RX ORDER — VALSARTAN 160 MG/1
TABLET ORAL
Qty: 90 TABLET | Refills: 2 | OUTPATIENT
Start: 2018-01-23

## 2018-01-23 RX ORDER — THIAMINE HCL 100 MG
TABLET ORAL
COMMUNITY
Start: 2018-01-05

## 2018-01-23 RX ORDER — AZACITIDINE 100 MG/1
50 INJECTION, POWDER, LYOPHILIZED, FOR SOLUTION INTRAVENOUS; SUBCUTANEOUS
Status: CANCELLED | OUTPATIENT
Start: 2018-01-24

## 2018-01-23 RX ADMIN — AZACITIDINE 80 MG: 100 INJECTION, POWDER, LYOPHILIZED, FOR SOLUTION INTRAVENOUS; SUBCUTANEOUS at 01:01

## 2018-01-23 NOTE — PROGRESS NOTES
Repeat marrow stable from dysplasia but clonal evolution  2 more cycles then marrow   Add on erythropotein level to her next labs to assess for procrit   Repeat myeloma labs next appt  Proceed with vidaza but decrease to day 1-5 50mg m2  Continue ppx  BIW labs for next 4 weeks; tue/friday   FU 4 weeks for next cycle

## 2018-01-23 NOTE — Clinical Note
-q Monday /thursday  cbc, cmp, type and screen (10am-11am as only time she can get rides) for next 4   weeks  -same labs, MD appt, chair for 5  days of vidaza injections on 2/20 -please add on erythroetin level to her next lab

## 2018-01-23 NOTE — NURSING
Patient here for vidaza injections-given in 2 divided doses-seen by M.D. Today-complains of fatigue-tolerated injections well.

## 2018-01-23 NOTE — PROGRESS NOTES
SECTION OF HEMATOLOGY AND BONE MARROW TRANSPLANT  Return Patient Visit   01/24/2018    CHIEF COMPLAINT:   Chief Complaint   Patient presents with    Pain     Stiffiness in lower back       HISTORY OF PRESENT ILLNESS:   Shara Rose is a 82 y.o. female who is referred to me July 2017 for a Hematology consultation for further evaluation of anemia. Patient's past medical history includes Sickle Cell Trait, Hypothyroidism, Chronic Kidney Disease.  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  Decision made to proceed with palliative vidaza.  She is now s/p 4 cycles.  We obtained post cycle 4 bone marrow biopsy and she presents with her friend to review these results.  She has been dependent on tranfusions of plts and blood since initiation of therapy.     Denies fever, chills, nightsweats, bleeding, brusing, lymphadenopathy, signs/symptoms of splenomegaly.        PAST MEDICAL HISTORY:   Past Medical History:   Diagnosis Date    Allergy     Anemia     Arthritis     Cataract     CKD (chronic kidney disease)     Gout, unspecified     Hypertension     Hypothyroidism     MDS (myelodysplastic syndrome) 8/14/2017    Menopause     PNA (pneumonia) 10/15/2017    Sickle cell trait     Trigger finger        PAST SURGICAL HISTORY:   Past Surgical History:   Procedure Laterality Date    APPENDECTOMY      HEMORRHOID SURGERY      HYSTERECTOMY         PAST SOCIAL HISTORY:   reports that she quit smoking about 45 years ago. She has a 0.25 pack-year smoking history. She has never used smokeless tobacco. She reports that she does not drink alcohol or use drugs.    FAMILY HISTORY:  Family History   Problem Relation Age of Onset    Hypertension Mother     Peripheral vascular disease Father     Heart disease Father     Cataracts Father     Cancer Son     Diabetes Neg Hx     Amblyopia Neg Hx     Blindness Neg Hx     Glaucoma Neg Hx     Macular  degeneration Neg Hx     Retinal detachment Neg Hx     Strabismus Neg Hx        CURRENT MEDICATIONS:   Current Outpatient Prescriptions   Medication Sig    acyclovir (ZOVIRAX) 200 MG capsule Take 2 capsules (400 mg total) by mouth 2 (two) times daily.    allopurinol (ZYLOPRIM) 100 MG tablet Take 1 tablet (100 mg total) by mouth once daily. For gout    AZACITIDINE (VIDAZA INJ) Inject as directed.    ciprofloxacin HCl (CIPRO) 500 MG tablet Take 1 tablet (500 mg total) by mouth 2 (two) times daily.    clotrimazole-betamethasone 1-0.05% (LOTRISONE) cream Apply topically 2 (two) times daily.    levothyroxine (SYNTHROID) 25 MCG tablet TAKE 1 TABLET EVERY DAY    meclizine (ANTIVERT) 25 mg tablet TK 1 T PO BID FOR 10 DAYS PRF DIZZINESS    ondansetron (ZOFRAN) 8 MG tablet Take 1 tablet (8 mg total) by mouth every 12 (twelve) hours as needed for Nausea.    pantoprazole (PROTONIX) 40 MG tablet Take 1 tablet (40 mg total) by mouth once daily. While on prednisone    valsartan (DIOVAN) 160 MG tablet Take 1 tablet (160 mg total) by mouth once daily.    verapamil (CALAN-SR) 240 MG CR tablet TAKE 1 TABLET EVERY DAY    VITAMIN B-1 100 MG tablet     ciprofloxacin HCl (CIPRO) 500 MG tablet Take 1 tablet (500 mg total) by mouth 2 (two) times daily.    colchicine 0.6 mg tablet Take 1 tablet (0.6 mg total) by mouth once daily.    fluconazole (DIFLUCAN) 200 MG Tab Take 1 tablet (200 mg total) by mouth once daily.     Current Facility-Administered Medications   Medication    sodium chloride 0.9% flush 10 mL     ALLERGIES:   Review of patient's allergies indicates:   Allergen Reactions    Sulfa (sulfonamide antibiotics) Itching and Rash           REVIEW OF SYSTEMS:   Review of Systems - General ROS: negative  Psychological ROS: negative  Ophthalmic ROS: negative  Allergy and Immunology ROS: negative  Breast ROS: negative  Respiratory ROS: negative  Cardiovascular ROS: negative  Gastrointestinal ROS: negative  Genito-Urinary  "ROS: negative  Musculoskeletal ROS: negative  Neurological ROS: negative  Dermatological ROS: negative    PHYSICAL EXAM:   Vitals:    01/23/18 1111   BP: (!) 188/80   Pulse: (!) 59   Resp: 16   Temp: 97.8 °F (36.6 °C)   TempSrc: Oral   SpO2: 99%   Weight: 57.7 kg (127 lb 3.3 oz)   Height: 5' 5" (1.651 m)   PainSc:   1   PainLoc: Back     General - well developed, well nourished, no apparent distress  HEENT - oropharynx clear  Chest and Lung - clear to auscultation bilaterally   Cardiovascular - RRR with no MGR, normal S1 and S2  Abdomen-  soft, nontender, no palpable hepatomegaly or splenomegaly  Lymph - no palpable lymphadenopathy  Heme - no bruising, petechiae, pallor  Skin - no rashes or lesions  Psych - appropriate mood and affect      ECOG Performance Status: (foot note - ECOG PS provided by [employer]cology [employer]) 1 - Symptomatic but completely ambulatory    Karnofsky Performance Score:  80%- Normal Activity with Effort: Some Symptoms of Disease  DATA:   Lab Results   Component Value Date    WBC 1.02 (LL) 01/23/2018    HGB 8.5 (L) 01/23/2018    HCT 25.5 (L) 01/23/2018    MCV 89 01/23/2018    PLT 13 (LL) 01/23/2018     Gran #   Date Value Ref Range Status   01/16/2018 0.4 (L) 1.8 - 7.7 K/uL Final     Gran%   Date Value Ref Range Status   01/23/2018 40.0 38.0 - 73.0 % Corrected     Comment:     Corrected result; previously reported as 34.4 on %DDDDDDDD% at %TTT%.     Lymph #   Date Value Ref Range Status   01/23/2018 Test Not Performed 1.0 - 4.8 K/uL Corrected     Comment:     Corrected result; previously reported as 0.6 on %DDDDDDDD% at %TTT%.     Lymph%   Date Value Ref Range Status   01/23/2018 55.0 (H) 18.0 - 48.0 % Corrected     Comment:     Corrected result; previously reported as 59.8 on %DDDDDDDD% at %TTT%.     CMP  Sodium   Date Value Ref Range Status   01/23/2018 143 136 - 145 mmol/L Final     Potassium   Date Value Ref Range Status   01/23/2018 3.8 3.5 - 5.1 mmol/L Final     Chloride "   Date Value Ref Range Status   01/23/2018 109 95 - 110 mmol/L Final     CO2   Date Value Ref Range Status   01/23/2018 28 23 - 29 mmol/L Final     Glucose   Date Value Ref Range Status   01/23/2018 80 70 - 110 mg/dL Final     BUN, Bld   Date Value Ref Range Status   01/23/2018 19 8 - 23 mg/dL Final     Creatinine   Date Value Ref Range Status   01/23/2018 0.9 0.5 - 1.4 mg/dL Final     Calcium   Date Value Ref Range Status   01/23/2018 9.5 8.7 - 10.5 mg/dL Final     Total Protein   Date Value Ref Range Status   01/23/2018 8.2 6.0 - 8.4 g/dL Final     Albumin   Date Value Ref Range Status   01/23/2018 2.9 (L) 3.5 - 5.2 g/dL Final     Total Bilirubin   Date Value Ref Range Status   01/23/2018 0.3 0.1 - 1.0 mg/dL Final     Comment:     For infants and newborns, interpretation of results should be based  on gestational age, weight and in agreement with clinical  observations.  Premature Infant recommended reference ranges:  Up to 24 hours.............<8.0 mg/dL  Up to 48 hours............<12.0 mg/dL  3-5 days..................<15.0 mg/dL  6-29 days.................<15.0 mg/dL       Alkaline Phosphatase   Date Value Ref Range Status   01/23/2018 183 (H) 55 - 135 U/L Final     AST   Date Value Ref Range Status   01/23/2018 15 10 - 40 U/L Final     ALT   Date Value Ref Range Status   01/23/2018 9 (L) 10 - 44 U/L Final     Anion Gap   Date Value Ref Range Status   01/23/2018 6 (L) 8 - 16 mmol/L Final     eGFR if    Date Value Ref Range Status   01/23/2018 >60.0 >60 mL/min/1.73 m^2 Final     eGFR if non    Date Value Ref Range Status   01/23/2018 >60.0 >60 mL/min/1.73 m^2 Final     Comment:     Calculation used to obtain the estimated glomerular filtration  rate (eGFR) is the CKD-EPI equation.        LD   Date Value Ref Range Status   08/03/2017 184 110 - 260 U/L Final     Comment:     Results are increased in hemolyzed samples.     Lab Results   Component Value Date    IRON 122 08/03/2017     TIBC 209 (L) 08/03/2017    FERRITIN 576 (H) 08/03/2017     Lab Results   Component Value Date    KOWASCFL48 362 08/03/2017     Lab Results   Component Value Date    FOLATE 35.2 (H) 08/03/2017   Pathologist Interpretation TAHIR   Pathologist Interpretation TAHIR   Collected: 08/03/17 1522   Resulting lab: [facility]   Value: REVIEWED   Comment: Electronically reviewed and signed by:   Rhianna Mao MD   Signed on 08/04/17 at 13:32   There is a very faint IgA kappa monoclonal band in beta. - SPEP negative     HEMOGLOBIN ELECTROPHORESIS,HGB A2 UGO.   Order: [id number]   Status:  Final result   Visible to patient:  No (Not Released) Next appt:  None Dx:  Anemia     Ref Range & Units 3yr ago 6yr ago    Hgb A2 Quant 1.5 - 3.8 % 2.4 2.2CM    Hemoglobin Bands   Hb A , Hb S and Hb A2 textCM    Hemoglobin Electrophoresis Interp   See comment    Comments: Hemoglobins A and S are present, measuring approximately 62% and 37%   respectively.  This pattern is compatible with the patient's history   of sickle cell trait, provided the patient has no recent history of   red cell transfusion.  Correlate clinically.   Interpreted by Rhianna Mao M.D.                  Bone Marrow biopsy - 8/7/17  SPECIMEN  1) Bone marrow clot, left iliac crest.  2) Bone marrow core biopsy, left iliac crest.  3) Bone Marrow Aspirate (Slides)  FINAL PATHOLOGIC DIAGNOSIS  LEFT ILIAC CREST BONE MARROW ASPIRATE SMEAR, CLOT SECTION, AND CORE BIOPSY:  -- HYPERCELLULAR MARROW FOR AGE (70%) WITH ERYTHROID HYPERPLASIA, RING SIDEROBLASTS  AND ATYPICAL MEGAKARYOCYTIC PROLIFERATION, HIGHLY SUGGESTIVE OF A MYELODYSPLASTIC  SYNDROME.  -- INCREASED IRON STORAGE.  -- SEE COMMENT.  COMMENT:  CBC data (8/3/2017): WBC 2.31 (granulocyte 22.9%, lymphocyte 55%, monocyte 5.2%, eosinophil 16.9%), RBC  2.66, HGB 8.5, HCT 24.7, MCV 93, MCHC 34.4, PLT 24.  Peripheral blood smear is not submitted for review.  Flow cytometric analysis of bone  marrow shows populations of polyclonal B lymphocytes and T lymphocytes that  are immunophenotypically unremarkable. The blast gate is not increased.  Immunohistochemical stains are performed on the biopsy core for greater sensitivity and further architectural  assessment with adequate controls. CD34 highlights rare immature mononuclear cells. The early erythroid  precursors are positive for E-cadherin. CD61 highlights markedly increased megakaryocytes with frequent small  forms. The small lymphoid aggregates are composed of a mixture of CD3- positive T-cells and CD20-positive  B-cells.  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  Correlation with cytogenetics is critical. Close clinical follow-up is required.  Diagnosed by: Gio Prado  (Electronically Signed: 2017-08-10 15:02:16)  Microscopic Examination  BONE MARROW ASPIRATE DIFFERENTIAL:  Blasts----------------------------------------------1%  Promyelocytes---------------------------------0%  Myelocytes--------------------------------------3%  Metamyelocytes------------------------------ 5%  Bands----------------------------------------------4%  PMN Neutrophils------------------------------6%  Eosinophils------------------------------------------2%  Basophils---------------------------------------0%  Monocytes------------------------------------1%  Lymphocytes-------------------------------------31%  Plasma cells------------------------------------------1%  Erythroid precursors--------------------------47%  BONE MARROW ASPIRATE SMEAR:  The smears contain cellular spicules. The myeloid to erythroid ratio is markedly decreased, approximately 0.5:1.  Some erythroid precursors display megaloblastoid change and a shift towards immaturity. Blasts are not increased.  Megakaryocytes are increased and  "display frequent atypical nuclear features including hypolobation and small forms  as well as occasional forms with  nuclei. Stainable iron is increased. Ring sideroblasts are seen,  approximately 30-35% of the erythroid precursors.  BONE MARROW CLOT SECTION:  The clot sections contain small spicules with cellular composition that is similar to the biopsy core. Stainable iron is  present.  BONE MARROW CORE BIOPSY:  Cortical and medullary bones are present. The cellularity is approximately 70%. The myeloid to erythroid ratio is  markedly decreased. Megakaryocytes are increased and form small clusters in areas. Small lymphoid aggregates  composed of small mature lymphocytes are seen. Stainable iron is present.    Supplemental Diagnosis -1/11/2018  Please see fluorescent in-situ hybridization (FISH) results reported in [facility] from [facility]  ([facility], [address] [place] [address]):  "MDS FISH: Abnormal.  Interpretation:  Comments: The result is abnormal and indicates approximately 23% of nuclei had a 7q deletion and trisomy 8. 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 Since  trisomy 8 was not previously identified, this result may represent cytogenetic evolution; however, the significance of  the trisomy 8 is unclear. Clinical and hematopathologic correlation is recommended."  Please see cytogenetic karyotype results reported in [facility] from [facility] ([facility],  [address] [place] [address]):  " 51,XX,+1,brenda(1;7)(q10;p10),+8,+14,+15,+18,+19[8].  The result is abnormal. Each of 8 available metaphases had an unbalanced 1;7 translocation resulting in a 1q  duplication and a 7q deletion and trisomy 8, 14, 15, 18 and 19 was also observed. 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 This result indicates persistence of this patient's " "myeloid clone. Since  these additional abnormalities were not previously observed, this result likely indicates clonal evolution. "  (Electronically Signed: 2018-01-22 13:57:43 )  Diagnosed by: Gio Prado  FINAL PATHOLOGIC DIAGNOSIS  BONE MARROW, LEFT ILIAC CREST (ASPIRATE SMEAR, TOUCH PREPARATION, CLOT SECTION, AND  CORE BIOPSY):  -- NORMOCELLULAR MARROW (20%) WITH MILD TRILINEAGE HYPOPLASIA AND LYMPHOCYTOSIS (SEE  COMMENT).  -- ADEQUATE IRON STORAGE.  COMMENT:  CBC data (1/9/2018): WBC 1.10 K/?L (granulocyte 38.1 %, lymphocyte 61.9 %, monocyte 0.0 %, eosinophil 0.0 %,  basophil 0.0 %), RBC 2.44 M/?L, HGB 7.0 g/dL, HCT 21.1 %, MCV 87 fL, MCH 28.7 pg, MCHC 33.2 g/dL, Platelet  3 K/?L.  Peripheral blood smear is not provided for review.  Flow cytometric analysis of bone marrow shows populations of polyclonal B lymphocytes. T lymphocytes show  reversed CD4:CD8 ratio. The blast gate is not increased.  Immunohistochemical stains are performed on the biopsy core for greater sensitivity and further architectural  assessment with adequate controls. CD34 stains rare blasts. The cellularity is composed of predominantly  CD3-positive T lymphocytes and fewer CD79a/PAX5-positive B lymphocytes. Myeloperoxidase and spectrin stain  decreased myeloid and erythroid precursors, respectively. TdT is negative.  Overall, the aspirate smear and the touch preparation is suboptimal for evaluation of dysplasia and ring  sideroblasts. Although the marrow is normocellular for age, mild trilineage hypoplasia is noted. Correlation with  other laboratory results including cytogenetics is recommended.  Diagnosed by: Gio Prado  (Electronically Signed: 2018-01-16 11:49:43)  ASSESSMENT AND PLAN:   Encounter Diagnoses   Name Primary?   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 bone " marrow biopsy ; Int 2 IPSS; Very high risk R-IPSS  -not a stem cell transplant candidate due to age/comorbidities  -discussed terminal nature of diagnosis and recommendations for hypomethylating agent therapy  -discussed risks of disease associated with bone marrow failure and transformation to AML  -can consider addition of GASTON if no response in anemia to vidaza   -pancytopenia due to MDS and vidaza ; suspect anemia augmented buy underlying sickle trait ;  transfuse for plt >10, hgb >7; plan for 1 unit prbc and 1 unit plt today; given transfusion requirements plan for twice weekly labs and prn transfusions   -she has  completed 4 cycles of vidaza (last 11/28/17) ;   repeat bone marrow biopsy  Today shows stable morphologic findings but there has been cytogenetic clonal evolution   -discussed with patient that given limited therapeutic options following HMA failure, and that some possible delayed HMA response that I would recommend proceeding with another 2 cycles of vidaza and perform repeat bone marrow biopsy after cycle #6  -will decrease dose /schedule to 50mg/m2  day 1-5 of 28 day cycle to mitigate cytopenias and transfusion requirements   -add on erythorpoetin level to next lab to assess for possible efficacy of procrit   -reviewed neutropenic and bleeding precautions with patient  -prn zofran for home use for CINV   MGUS  -for IgA MGUS; anemia likely due to MDS; no hypercalcemia, Cr at baseline; will discuss skeletal survey at next appt but lack of clonal plasma cells in bone marrow make underlying myeloma highly unlikely; repeat myeloma studies prior to next vidaza cycle     2)ID  -completed course of levaquin for CAP on 10/25/17  -transition back to cipro ppx  -contineu acyc, fluc ppx     3)gout  -continue colchicine; improving     -continue all other chronic medications per pcp     Follow Up:  -q tues/friday cbc, cmp, type and screen (10am-11am as only time she can get rides) for next 4   weeks   -same  labs, MD appt, chair for 5  days of vidaza injections on 2/20    Ankit Wang MD  Hematology/Oncology/Bone Marrow Transplant

## 2018-01-24 ENCOUNTER — INFUSION (OUTPATIENT)
Dept: INFUSION THERAPY | Facility: HOSPITAL | Age: 83
End: 2018-01-24
Attending: INTERNAL MEDICINE
Payer: MEDICARE

## 2018-01-24 VITALS — SYSTOLIC BLOOD PRESSURE: 189 MMHG | DIASTOLIC BLOOD PRESSURE: 79 MMHG | HEART RATE: 72 BPM

## 2018-01-24 DIAGNOSIS — D46.9 MDS (MYELODYSPLASTIC SYNDROME): Primary | ICD-10-CM

## 2018-01-24 PROCEDURE — 96401 CHEMO ANTI-NEOPL SQ/IM: CPT

## 2018-01-24 PROCEDURE — 63600175 PHARM REV CODE 636 W HCPCS: Mod: JG | Performed by: INTERNAL MEDICINE

## 2018-01-24 RX ORDER — AZACITIDINE 100 MG/1
50 INJECTION, POWDER, LYOPHILIZED, FOR SOLUTION INTRAVENOUS; SUBCUTANEOUS
Status: COMPLETED | OUTPATIENT
Start: 2018-01-24 | End: 2018-01-24

## 2018-01-24 RX ADMIN — AZACITIDINE 80 MG: 100 INJECTION, POWDER, LYOPHILIZED, FOR SOLUTION INTRAVENOUS; SUBCUTANEOUS at 11:01

## 2018-01-24 NOTE — NURSING
Patient here for vidaza injections-given in 2 divided doses-complains of fatigue-tolerated injections well.

## 2018-01-24 NOTE — PROGRESS NOTES
Patient, Shara Rose (MRN #[id number]), presented with a recent Platelet count less than 150 K/uL consistent with the definition of thrombocytopenia (ICD10 - D69.6).    Platelets   Date Value Ref Range Status   01/23/2018 13 (LL) 150 - 350 K/uL Final     Comment:     WBC, PLT   critical result(s) called and verbal readback obtained   from Hina Bustamante RN, 01/23/2018 11:03       The patient's thrombocytopenia was monitored, evaluated, addressed and/or treated. This addendum to the medical record is made on 01/24/2018.

## 2018-01-25 ENCOUNTER — TELEPHONE (OUTPATIENT)
Dept: HEMATOLOGY/ONCOLOGY | Facility: CLINIC | Age: 83
End: 2018-01-25

## 2018-01-25 ENCOUNTER — INFUSION (OUTPATIENT)
Dept: INFUSION THERAPY | Facility: HOSPITAL | Age: 83
End: 2018-01-25
Attending: INTERNAL MEDICINE
Payer: MEDICARE

## 2018-01-25 VITALS
RESPIRATION RATE: 18 BRPM | DIASTOLIC BLOOD PRESSURE: 62 MMHG | SYSTOLIC BLOOD PRESSURE: 139 MMHG | TEMPERATURE: 98 F | HEART RATE: 55 BPM

## 2018-01-25 VITALS
RESPIRATION RATE: 18 BRPM | TEMPERATURE: 98 F | HEART RATE: 63 BPM | SYSTOLIC BLOOD PRESSURE: 135 MMHG | DIASTOLIC BLOOD PRESSURE: 63 MMHG

## 2018-01-25 DIAGNOSIS — D46.Z MDS (MYELODYSPLASTIC SYNDROME), HIGH GRADE: Primary | ICD-10-CM

## 2018-01-25 DIAGNOSIS — D46.9 MDS (MYELODYSPLASTIC SYNDROME): Primary | ICD-10-CM

## 2018-01-25 DIAGNOSIS — D46.Z MDS (MYELODYSPLASTIC SYNDROME), HIGH GRADE: ICD-10-CM

## 2018-01-25 PROCEDURE — 63600175 PHARM REV CODE 636 W HCPCS: Performed by: INTERNAL MEDICINE

## 2018-01-25 PROCEDURE — P9037 PLATE PHERES LEUKOREDU IRRAD: HCPCS

## 2018-01-25 PROCEDURE — 96401 CHEMO ANTI-NEOPL SQ/IM: CPT

## 2018-01-25 PROCEDURE — 36430 TRANSFUSION BLD/BLD COMPNT: CPT

## 2018-01-25 PROCEDURE — 25000003 PHARM REV CODE 250: Performed by: INTERNAL MEDICINE

## 2018-01-25 PROCEDURE — 63600175 PHARM REV CODE 636 W HCPCS: Mod: JG | Performed by: INTERNAL MEDICINE

## 2018-01-25 PROCEDURE — 86644 CMV ANTIBODY: CPT

## 2018-01-25 RX ORDER — HYDROCODONE BITARTRATE AND ACETAMINOPHEN 500; 5 MG/1; MG/1
TABLET ORAL ONCE
Status: CANCELLED | OUTPATIENT
Start: 2018-01-25 | End: 2018-01-25

## 2018-01-25 RX ORDER — ACETAMINOPHEN 325 MG/1
650 TABLET ORAL
Status: COMPLETED | OUTPATIENT
Start: 2018-01-25 | End: 2018-01-25

## 2018-01-25 RX ORDER — DIPHENHYDRAMINE HYDROCHLORIDE 50 MG/ML
25 INJECTION INTRAMUSCULAR; INTRAVENOUS
Status: CANCELLED | OUTPATIENT
Start: 2018-01-25

## 2018-01-25 RX ORDER — AZACITIDINE 100 MG/1
50 INJECTION, POWDER, LYOPHILIZED, FOR SOLUTION INTRAVENOUS; SUBCUTANEOUS
Status: COMPLETED | OUTPATIENT
Start: 2018-01-25 | End: 2018-01-25

## 2018-01-25 RX ORDER — DIPHENHYDRAMINE HYDROCHLORIDE 50 MG/ML
25 INJECTION INTRAMUSCULAR; INTRAVENOUS
Status: COMPLETED | OUTPATIENT
Start: 2018-01-25 | End: 2018-01-25

## 2018-01-25 RX ORDER — ACETAMINOPHEN 325 MG/1
650 TABLET ORAL
Status: CANCELLED | OUTPATIENT
Start: 2018-01-25

## 2018-01-25 RX ORDER — HYDROCODONE BITARTRATE AND ACETAMINOPHEN 500; 5 MG/1; MG/1
TABLET ORAL ONCE
Status: COMPLETED | OUTPATIENT
Start: 2018-01-25 | End: 2018-01-25

## 2018-01-25 RX ADMIN — AZACITIDINE 80 MG: 100 INJECTION, POWDER, LYOPHILIZED, FOR SOLUTION INTRAVENOUS; SUBCUTANEOUS at 11:01

## 2018-01-25 RX ADMIN — SODIUM CHLORIDE: 0.9 INJECTION, SOLUTION INTRAVENOUS at 12:01

## 2018-01-25 RX ADMIN — ACETAMINOPHEN 650 MG: 325 TABLET ORAL at 12:01

## 2018-01-25 RX ADMIN — DIPHENHYDRAMINE HYDROCHLORIDE 25 MG: 50 INJECTION INTRAMUSCULAR; INTRAVENOUS at 12:01

## 2018-01-25 NOTE — PLAN OF CARE
Problem: Patient Care Overview  Goal: Plan of Care Review  Outcome: Ongoing (interventions implemented as appropriate)  Tolerated treatment well.  Advised to call MD for any problems or concerns.  AVS declined.  RTC on tomorrow for Vidaza injection.  NAD noted upon discharge.

## 2018-01-25 NOTE — PLAN OF CARE
Problem: Anemia (Adult)  Goal: Symptom Improvement  Patient will demonstrate the desired outcomes by discharge/transition of care.   Outcome: Ongoing (interventions implemented as appropriate)  Here for platelet transfusion.  Consent signed and witnessed.  Comfort measures provided.

## 2018-01-25 NOTE — NURSING
Pt arrived for vidaza D3.  Labs reviewed with pt.  Pt tolerated injections x2 to right side of abd.  Pt is going to get plts today.  Pt now waiting for infusion chair.

## 2018-01-26 ENCOUNTER — INFUSION (OUTPATIENT)
Dept: INFUSION THERAPY | Facility: HOSPITAL | Age: 83
End: 2018-01-26
Attending: INTERNAL MEDICINE
Payer: MEDICARE

## 2018-01-26 VITALS
DIASTOLIC BLOOD PRESSURE: 75 MMHG | TEMPERATURE: 98 F | HEART RATE: 55 BPM | SYSTOLIC BLOOD PRESSURE: 173 MMHG | RESPIRATION RATE: 18 BRPM

## 2018-01-26 DIAGNOSIS — D46.9 MDS (MYELODYSPLASTIC SYNDROME): Primary | ICD-10-CM

## 2018-01-26 PROCEDURE — 63600175 PHARM REV CODE 636 W HCPCS: Mod: JG | Performed by: INTERNAL MEDICINE

## 2018-01-26 PROCEDURE — 96401 CHEMO ANTI-NEOPL SQ/IM: CPT

## 2018-01-26 RX ORDER — AZACITIDINE 100 MG/1
50 INJECTION, POWDER, LYOPHILIZED, FOR SOLUTION INTRAVENOUS; SUBCUTANEOUS
Status: COMPLETED | OUTPATIENT
Start: 2018-01-26 | End: 2018-01-26

## 2018-01-26 RX ADMIN — AZACITIDINE 80 MG: 100 INJECTION, POWDER, LYOPHILIZED, FOR SOLUTION INTRAVENOUS; SUBCUTANEOUS at 11:01

## 2018-01-26 NOTE — NURSING
Pt arrived for vidaza D4.  Pt tolerated injection SQ x2 to abd and will RTC tomorrow about 10 for day 5 of injection.  Pt discharged to home with friend.

## 2018-01-27 ENCOUNTER — INFUSION (OUTPATIENT)
Dept: INFUSION THERAPY | Facility: HOSPITAL | Age: 83
End: 2018-01-27
Attending: INTERNAL MEDICINE
Payer: MEDICARE

## 2018-01-27 DIAGNOSIS — D46.9 MDS (MYELODYSPLASTIC SYNDROME): Primary | ICD-10-CM

## 2018-01-27 PROCEDURE — 63600175 PHARM REV CODE 636 W HCPCS: Mod: JG | Performed by: INTERNAL MEDICINE

## 2018-01-27 PROCEDURE — 96401 CHEMO ANTI-NEOPL SQ/IM: CPT

## 2018-01-27 RX ORDER — AZACITIDINE 100 MG/1
50 INJECTION, POWDER, LYOPHILIZED, FOR SOLUTION INTRAVENOUS; SUBCUTANEOUS
Status: COMPLETED | OUTPATIENT
Start: 2018-01-27 | End: 2018-01-27

## 2018-01-27 RX ADMIN — AZACITIDINE 80 MG: 100 INJECTION, POWDER, LYOPHILIZED, FOR SOLUTION INTRAVENOUS; SUBCUTANEOUS at 09:01

## 2018-01-29 ENCOUNTER — TELEPHONE (OUTPATIENT)
Dept: HEMATOLOGY/ONCOLOGY | Facility: CLINIC | Age: 83
End: 2018-01-29

## 2018-02-01 ENCOUNTER — LAB VISIT (OUTPATIENT)
Dept: LAB | Facility: HOSPITAL | Age: 83
End: 2018-02-01
Attending: INTERNAL MEDICINE
Payer: MEDICARE

## 2018-02-01 DIAGNOSIS — D46.9 MYELODYSPLASTIC SYNDROME: ICD-10-CM

## 2018-02-01 DIAGNOSIS — D47.2 MONOCLONAL GAMMOPATHY OF UNDETERMINED SIGNIFICANCE: ICD-10-CM

## 2018-02-01 LAB
ABO + RH BLD: NORMAL
ALBUMIN SERPL BCP-MCNC: 3.2 G/DL
ALP SERPL-CCNC: 150 U/L
ALT SERPL W/O P-5'-P-CCNC: 11 U/L
ANION GAP SERPL CALC-SCNC: 7 MMOL/L
ANISOCYTOSIS BLD QL SMEAR: SLIGHT
AST SERPL-CCNC: 19 U/L
BASOPHILS # BLD AUTO: 0 K/UL
BASOPHILS NFR BLD: 0 %
BILIRUB SERPL-MCNC: 0.4 MG/DL
BLD GP AB SCN CELLS X3 SERPL QL: NORMAL
BUN SERPL-MCNC: 15 MG/DL
CALCIUM SERPL-MCNC: 9.4 MG/DL
CHLORIDE SERPL-SCNC: 107 MMOL/L
CO2 SERPL-SCNC: 27 MMOL/L
CREAT SERPL-MCNC: 1.1 MG/DL
DIFFERENTIAL METHOD: ABNORMAL
EOSINOPHIL # BLD AUTO: 0 K/UL
EOSINOPHIL NFR BLD: 3.2 %
ERYTHROCYTE [DISTWIDTH] IN BLOOD BY AUTOMATED COUNT: 13.4 %
EST. GFR  (AFRICAN AMERICAN): 54.4 ML/MIN/1.73 M^2
EST. GFR  (NON AFRICAN AMERICAN): 47.2 ML/MIN/1.73 M^2
GLUCOSE SERPL-MCNC: 92 MG/DL
HCT VFR BLD AUTO: 21.7 %
HGB BLD-MCNC: 7.2 G/DL
IMM GRANULOCYTES # BLD AUTO: 0 K/UL
IMM GRANULOCYTES NFR BLD AUTO: 0 %
LYMPHOCYTES # BLD AUTO: 0.7 K/UL
LYMPHOCYTES NFR BLD: 70.5 %
MCH RBC QN AUTO: 28.6 PG
MCHC RBC AUTO-ENTMCNC: 33.2 G/DL
MCV RBC AUTO: 86 FL
MONOCYTES # BLD AUTO: 0 K/UL
MONOCYTES NFR BLD: 3.2 %
NEUTROPHILS # BLD AUTO: 0.2 K/UL
NEUTROPHILS NFR BLD: 23.1 %
NRBC BLD-RTO: 0 /100 WBC
PLATELET # BLD AUTO: 12 K/UL
PLATELET BLD QL SMEAR: ABNORMAL
PMV BLD AUTO: 9.3 FL
POTASSIUM SERPL-SCNC: 3.6 MMOL/L
PROT SERPL-MCNC: 8 G/DL
RBC # BLD AUTO: 2.52 M/UL
SODIUM SERPL-SCNC: 141 MMOL/L
WBC # BLD AUTO: 0.95 K/UL

## 2018-02-01 PROCEDURE — 80053 COMPREHEN METABOLIC PANEL: CPT

## 2018-02-01 PROCEDURE — 86850 RBC ANTIBODY SCREEN: CPT

## 2018-02-01 PROCEDURE — 85025 COMPLETE CBC W/AUTO DIFF WBC: CPT

## 2018-02-01 PROCEDURE — 36415 COLL VENOUS BLD VENIPUNCTURE: CPT

## 2018-02-05 ENCOUNTER — INFUSION (OUTPATIENT)
Dept: INFUSION THERAPY | Facility: HOSPITAL | Age: 83
End: 2018-02-05
Attending: INTERNAL MEDICINE
Payer: MEDICARE

## 2018-02-05 ENCOUNTER — TELEPHONE (OUTPATIENT)
Dept: HEMATOLOGY/ONCOLOGY | Facility: CLINIC | Age: 83
End: 2018-02-05

## 2018-02-05 VITALS
SYSTOLIC BLOOD PRESSURE: 169 MMHG | RESPIRATION RATE: 18 BRPM | HEART RATE: 58 BPM | DIASTOLIC BLOOD PRESSURE: 72 MMHG | TEMPERATURE: 98 F

## 2018-02-05 DIAGNOSIS — D46.9 MDS (MYELODYSPLASTIC SYNDROME): Primary | ICD-10-CM

## 2018-02-05 DIAGNOSIS — D46.9 MDS (MYELODYSPLASTIC SYNDROME): ICD-10-CM

## 2018-02-05 PROCEDURE — 36430 TRANSFUSION BLD/BLD COMPNT: CPT

## 2018-02-05 PROCEDURE — 86920 COMPATIBILITY TEST SPIN: CPT

## 2018-02-05 PROCEDURE — P9037 PLATE PHERES LEUKOREDU IRRAD: HCPCS

## 2018-02-05 PROCEDURE — 86644 CMV ANTIBODY: CPT

## 2018-02-05 PROCEDURE — P9040 RBC LEUKOREDUCED IRRADIATED: HCPCS

## 2018-02-05 PROCEDURE — 25000003 PHARM REV CODE 250: Performed by: INTERNAL MEDICINE

## 2018-02-05 PROCEDURE — 25000003 PHARM REV CODE 250: Performed by: NURSE PRACTITIONER

## 2018-02-05 RX ORDER — DIPHENHYDRAMINE HCL 25 MG
25 CAPSULE ORAL
Status: COMPLETED | OUTPATIENT
Start: 2018-02-05 | End: 2018-02-05

## 2018-02-05 RX ORDER — ACETAMINOPHEN 325 MG/1
650 TABLET ORAL
Status: CANCELLED | OUTPATIENT
Start: 2018-02-05

## 2018-02-05 RX ORDER — ACETAMINOPHEN 325 MG/1
650 TABLET ORAL
Status: COMPLETED | OUTPATIENT
Start: 2018-02-05 | End: 2018-02-05

## 2018-02-05 RX ORDER — HYDROCODONE BITARTRATE AND ACETAMINOPHEN 500; 5 MG/1; MG/1
TABLET ORAL ONCE
Status: DISCONTINUED | OUTPATIENT
Start: 2018-02-05 | End: 2018-02-05 | Stop reason: HOSPADM

## 2018-02-05 RX ORDER — HYDROCODONE BITARTRATE AND ACETAMINOPHEN 500; 5 MG/1; MG/1
TABLET ORAL ONCE
Status: CANCELLED | OUTPATIENT
Start: 2018-02-05 | End: 2018-02-05

## 2018-02-05 RX ORDER — CLONIDINE HYDROCHLORIDE 0.1 MG/1
0.1 TABLET ORAL
Status: COMPLETED | OUTPATIENT
Start: 2018-02-05 | End: 2018-02-05

## 2018-02-05 RX ORDER — DIPHENHYDRAMINE HCL 25 MG
25 CAPSULE ORAL
Status: CANCELLED | OUTPATIENT
Start: 2018-02-05

## 2018-02-05 RX ADMIN — ACETAMINOPHEN 650 MG: 325 TABLET ORAL at 12:02

## 2018-02-05 RX ADMIN — DIPHENHYDRAMINE HYDROCHLORIDE 25 MG: 25 CAPSULE ORAL at 12:02

## 2018-02-05 RX ADMIN — CLONIDINE HYDROCHLORIDE 0.1 MG: 0.1 TABLET ORAL at 12:02

## 2018-02-05 NOTE — TELEPHONE ENCOUNTER
----- Message from Juan Wang MD sent at 2/5/2018 11:16 AM CST -----  Pt will need 1 unit prbc and 1 unit plt. janey can you please place orders?   ----- Message -----  From: Renard, [facility] Interface  Sent: 2/5/2018  11:02 AM  To: Juan Wang MD      Pt was informed and verbalized understanding, orders were placed.  Hina

## 2018-02-05 NOTE — PLAN OF CARE
Problem: Patient Care Overview  Goal: Plan of Care Review  Outcome: Ongoing (interventions implemented as appropriate)  1620 -- Patient tolerated 1 unit of PRBC and 1 unit of PLTs without issue. VSS throughout transfusion.  Patient d/c'd to home.  AVS and Outpatient Blood Transfusion sheet given.  Patient instructed on S/S of reaction and when to call the MD.  Patient verbalized understanding.  Ambulated off unit with steady gait.

## 2018-02-05 NOTE — PLAN OF CARE
Problem: Patient Care Overview  Goal: Individualization & Mutuality  PIV  Warm blankets   Outcome: Ongoing (interventions implemented as appropriate)  1214 -- Patient's labs, history, and medications reviewed and is scheduled to receive 1 unit of PRBC and 1 unit of PLTs.   Patient presents with BP of 197/75 but reports feeling well, other than tired.  Notified Hina COPPOLA RN who gave verbal order for 0.1 Clonidine 25 mg PO per Dr. Wang.  Patient agrees with POC.  Will monitor.

## 2018-02-06 RX ORDER — ALLOPURINOL 100 MG/1
TABLET ORAL
Qty: 30 TABLET | Refills: 0 | OUTPATIENT
Start: 2018-02-06

## 2018-02-12 ENCOUNTER — TELEPHONE (OUTPATIENT)
Dept: HEMATOLOGY/ONCOLOGY | Facility: CLINIC | Age: 83
End: 2018-02-12

## 2018-02-15 ENCOUNTER — TELEPHONE (OUTPATIENT)
Dept: HEMATOLOGY/ONCOLOGY | Facility: CLINIC | Age: 83
End: 2018-02-15

## 2018-02-15 ENCOUNTER — INFUSION (OUTPATIENT)
Dept: INFUSION THERAPY | Facility: HOSPITAL | Age: 83
End: 2018-02-15
Attending: INTERNAL MEDICINE
Payer: MEDICARE

## 2018-02-15 VITALS
DIASTOLIC BLOOD PRESSURE: 90 MMHG | SYSTOLIC BLOOD PRESSURE: 160 MMHG | TEMPERATURE: 98 F | RESPIRATION RATE: 16 BRPM | HEART RATE: 65 BPM

## 2018-02-15 DIAGNOSIS — D61.818 PANCYTOPENIA: ICD-10-CM

## 2018-02-15 DIAGNOSIS — D46.9 MDS (MYELODYSPLASTIC SYNDROME): ICD-10-CM

## 2018-02-15 DIAGNOSIS — D46.9 MDS (MYELODYSPLASTIC SYNDROME): Primary | ICD-10-CM

## 2018-02-15 LAB
BLD PROD TYP BPU: NORMAL
BLD PROD TYP BPU: NORMAL
BLOOD UNIT EXPIRATION DATE: NORMAL
BLOOD UNIT EXPIRATION DATE: NORMAL
BLOOD UNIT TYPE CODE: 5100
BLOOD UNIT TYPE CODE: 7300
BLOOD UNIT TYPE: NORMAL
BLOOD UNIT TYPE: NORMAL
CODING SYSTEM: NORMAL
CODING SYSTEM: NORMAL
DISPENSE STATUS: NORMAL
DISPENSE STATUS: NORMAL
NUM UNITS TRANS PACKED RBC: NORMAL
NUM UNITS TRANS WBC-POOR PLATPHERESIS: NORMAL

## 2018-02-15 PROCEDURE — P9040 RBC LEUKOREDUCED IRRADIATED: HCPCS

## 2018-02-15 PROCEDURE — 96374 THER/PROPH/DIAG INJ IV PUSH: CPT

## 2018-02-15 PROCEDURE — 36430 TRANSFUSION BLD/BLD COMPNT: CPT

## 2018-02-15 PROCEDURE — 63600175 PHARM REV CODE 636 W HCPCS: Performed by: INTERNAL MEDICINE

## 2018-02-15 PROCEDURE — P9037 PLATE PHERES LEUKOREDU IRRAD: HCPCS

## 2018-02-15 PROCEDURE — 25000003 PHARM REV CODE 250: Performed by: INTERNAL MEDICINE

## 2018-02-15 PROCEDURE — 86644 CMV ANTIBODY: CPT

## 2018-02-15 PROCEDURE — 86920 COMPATIBILITY TEST SPIN: CPT

## 2018-02-15 RX ORDER — HYDROCODONE BITARTRATE AND ACETAMINOPHEN 500; 5 MG/1; MG/1
TABLET ORAL ONCE
Status: COMPLETED | OUTPATIENT
Start: 2018-02-15 | End: 2018-02-15

## 2018-02-15 RX ORDER — ACETAMINOPHEN 325 MG/1
650 TABLET ORAL
Status: CANCELLED | OUTPATIENT
Start: 2018-02-15

## 2018-02-15 RX ORDER — ACETAMINOPHEN 325 MG/1
650 TABLET ORAL
Status: COMPLETED | OUTPATIENT
Start: 2018-02-15 | End: 2018-02-15

## 2018-02-15 RX ORDER — HYDROCODONE BITARTRATE AND ACETAMINOPHEN 500; 5 MG/1; MG/1
TABLET ORAL ONCE
Status: CANCELLED | OUTPATIENT
Start: 2018-02-15 | End: 2018-02-15

## 2018-02-15 RX ORDER — DIPHENHYDRAMINE HYDROCHLORIDE 50 MG/ML
25 INJECTION INTRAMUSCULAR; INTRAVENOUS
Status: COMPLETED | OUTPATIENT
Start: 2018-02-15 | End: 2018-02-15

## 2018-02-15 RX ORDER — DIPHENHYDRAMINE HYDROCHLORIDE 50 MG/ML
25 INJECTION INTRAMUSCULAR; INTRAVENOUS
Status: CANCELLED | OUTPATIENT
Start: 2018-02-15

## 2018-02-15 RX ADMIN — ACETAMINOPHEN 650 MG: 325 TABLET, FILM COATED ORAL at 01:02

## 2018-02-15 RX ADMIN — DIPHENHYDRAMINE HYDROCHLORIDE 25 MG: 50 INJECTION INTRAMUSCULAR; INTRAVENOUS at 01:02

## 2018-02-15 RX ADMIN — SODIUM CHLORIDE: 0.9 INJECTION, SOLUTION INTRAVENOUS at 01:02

## 2018-02-15 NOTE — PLAN OF CARE
Problem: Anemia (Adult)  Goal: Identify Related Risk Factors and Signs and Symptoms  Related risk factors and signs and symptoms are identified upon initiation of Human Response Clinical Practice Guideline (CPG)   Outcome: Ongoing (interventions implemented as appropriate)  Patient here for 1 unit PRBC and 1 unit platelets.  Assessment complete and labs reviewed.  BP elevated; patient states she did not take home meds this morning.  Notified MD who stated for patient to take home BP meds and proceed with blood products.  Will re-check BP.  Chair reclined and blanket offered.  No needs expressed at this time.  Will continue to monitor.       
Problem: Patient Care Overview  Goal: Plan of Care Review  Outcome: Ongoing (interventions implemented as appropriate)  Patient tolerated blood and platelet transfusion well.  No reaction suspected.  VSS.  Educated patient on when to seek medical attention.  No questions or concerns.  AVS given to patient.  Patient ambulated off unit unassisted.       
Daily Assessment

## 2018-02-20 ENCOUNTER — INFUSION (OUTPATIENT)
Dept: INFUSION THERAPY | Facility: HOSPITAL | Age: 83
End: 2018-02-20
Attending: INTERNAL MEDICINE
Payer: MEDICARE

## 2018-02-20 ENCOUNTER — TELEPHONE (OUTPATIENT)
Dept: HEMATOLOGY/ONCOLOGY | Facility: CLINIC | Age: 83
End: 2018-02-20

## 2018-02-20 ENCOUNTER — OFFICE VISIT (OUTPATIENT)
Dept: HEMATOLOGY/ONCOLOGY | Facility: CLINIC | Age: 83
End: 2018-02-20
Payer: MEDICARE

## 2018-02-20 VITALS
OXYGEN SATURATION: 100 % | RESPIRATION RATE: 18 BRPM | DIASTOLIC BLOOD PRESSURE: 79 MMHG | HEART RATE: 61 BPM | BODY MASS INDEX: 21.16 KG/M2 | HEIGHT: 65 IN | WEIGHT: 127 LBS | TEMPERATURE: 98 F | SYSTOLIC BLOOD PRESSURE: 189 MMHG

## 2018-02-20 VITALS — SYSTOLIC BLOOD PRESSURE: 152 MMHG | HEART RATE: 77 BPM | DIASTOLIC BLOOD PRESSURE: 67 MMHG

## 2018-02-20 DIAGNOSIS — D46.9 MDS (MYELODYSPLASTIC SYNDROME): Primary | ICD-10-CM

## 2018-02-20 DIAGNOSIS — D46.Z MDS (MYELODYSPLASTIC SYNDROME), HIGH GRADE: Primary | ICD-10-CM

## 2018-02-20 DIAGNOSIS — D47.2 MONOCLONAL GAMMOPATHY OF UNDETERMINED SIGNIFICANCE: ICD-10-CM

## 2018-02-20 DIAGNOSIS — D61.818 PANCYTOPENIA: ICD-10-CM

## 2018-02-20 PROCEDURE — 1159F MED LIST DOCD IN RCRD: CPT | Mod: S$GLB,,, | Performed by: INTERNAL MEDICINE

## 2018-02-20 PROCEDURE — 63600175 PHARM REV CODE 636 W HCPCS: Mod: JG | Performed by: INTERNAL MEDICINE

## 2018-02-20 PROCEDURE — 96401 CHEMO ANTI-NEOPL SQ/IM: CPT

## 2018-02-20 PROCEDURE — 99999 PR PBB SHADOW E&M-EST. PATIENT-LVL III: CPT | Mod: PBBFAC,,, | Performed by: INTERNAL MEDICINE

## 2018-02-20 PROCEDURE — 99214 OFFICE O/P EST MOD 30 MIN: CPT | Mod: S$GLB,,, | Performed by: INTERNAL MEDICINE

## 2018-02-20 PROCEDURE — 1126F AMNT PAIN NOTED NONE PRSNT: CPT | Mod: S$GLB,,, | Performed by: INTERNAL MEDICINE

## 2018-02-20 PROCEDURE — 3008F BODY MASS INDEX DOCD: CPT | Mod: S$GLB,,, | Performed by: INTERNAL MEDICINE

## 2018-02-20 RX ORDER — AZACITIDINE 100 MG/1
50 INJECTION, POWDER, LYOPHILIZED, FOR SOLUTION INTRAVENOUS; SUBCUTANEOUS
Status: CANCELLED | OUTPATIENT
Start: 2018-03-24

## 2018-02-20 RX ORDER — AZACITIDINE 100 MG/1
50 INJECTION, POWDER, LYOPHILIZED, FOR SOLUTION INTRAVENOUS; SUBCUTANEOUS
Status: CANCELLED | OUTPATIENT
Start: 2018-03-23

## 2018-02-20 RX ORDER — AZACITIDINE 100 MG/1
50 INJECTION, POWDER, LYOPHILIZED, FOR SOLUTION INTRAVENOUS; SUBCUTANEOUS
Status: COMPLETED | OUTPATIENT
Start: 2018-02-20 | End: 2018-02-20

## 2018-02-20 RX ORDER — AZACITIDINE 100 MG/1
50 INJECTION, POWDER, LYOPHILIZED, FOR SOLUTION INTRAVENOUS; SUBCUTANEOUS
Status: CANCELLED | OUTPATIENT
Start: 2018-03-25

## 2018-02-20 RX ORDER — AZACITIDINE 100 MG/1
50 INJECTION, POWDER, LYOPHILIZED, FOR SOLUTION INTRAVENOUS; SUBCUTANEOUS
Status: CANCELLED | OUTPATIENT
Start: 2018-02-20

## 2018-02-20 RX ORDER — AZACITIDINE 100 MG/1
50 INJECTION, POWDER, LYOPHILIZED, FOR SOLUTION INTRAVENOUS; SUBCUTANEOUS
Status: CANCELLED | OUTPATIENT
Start: 2018-03-22

## 2018-02-20 RX ADMIN — AZACITIDINE 80 MG: 100 INJECTION, POWDER, LYOPHILIZED, FOR SOLUTION INTRAVENOUS; SUBCUTANEOUS at 12:02

## 2018-02-20 NOTE — PROGRESS NOTES
SECTION OF HEMATOLOGY AND BONE MARROW TRANSPLANT  Return Patient Visit   02/22/2018    CHIEF COMPLAINT:   Chief Complaint   Patient presents with    Myelodysplastic syndrome    stiffness     pt c/o stiffness in back       HISTORY OF PRESENT ILLNESS:   Shara Rose is a 82 y.o. female who is referred to me July 2017 for a Hematology consultation for further evaluation of anemia. Patient's past medical history includes Sickle Cell Trait, Hypothyroidism, Chronic Kidney Disease.  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  Decision made to proceed with palliative vidaza.  She is now s/p 4 cycles.  We obtained post cycle 4 bone marrow biopsy that showed minimal response and clonal evolution.  Given lack of other therapeutic options decision made to proceed with 2 additional cycles at reduced dose/schedule.d   She remains  dependent on tranfusions of plts and blood since initiation of therapy.     Denies fever, chills, nightsweats, bleeding, brusing, lymphadenopathy, signs/symptoms of splenomegaly.  Comes to clinic with friend prior to cycle #6.         PAST MEDICAL HISTORY:   Past Medical History:   Diagnosis Date    Allergy     Anemia     Arthritis     Cataract     CKD (chronic kidney disease)     Gout, unspecified     Hypertension     Hypothyroidism     MDS (myelodysplastic syndrome) 8/14/2017    Menopause     PNA (pneumonia) 10/15/2017    Sickle cell trait     Trigger finger        PAST SURGICAL HISTORY:   Past Surgical History:   Procedure Laterality Date    APPENDECTOMY      HEMORRHOID SURGERY      HYSTERECTOMY         PAST SOCIAL HISTORY:   reports that she quit smoking about 45 years ago. She has a 0.25 pack-year smoking history. She has never used smokeless tobacco. She reports that she does not drink alcohol or use drugs.    FAMILY HISTORY:  Family History   Problem Relation Age of Onset    Hypertension Mother      Peripheral vascular disease Father     Heart disease Father     Cataracts Father     Cancer Son     Diabetes Neg Hx     Amblyopia Neg Hx     Blindness Neg Hx     Glaucoma Neg Hx     Macular degeneration Neg Hx     Retinal detachment Neg Hx     Strabismus Neg Hx        CURRENT MEDICATIONS:   Current Outpatient Prescriptions   Medication Sig    acyclovir (ZOVIRAX) 200 MG capsule Take 2 capsules (400 mg total) by mouth 2 (two) times daily.    allopurinol (ZYLOPRIM) 100 MG tablet Take 1 tablet (100 mg total) by mouth once daily. For gout    AZACITIDINE (VIDAZA INJ) Inject as directed.    ciprofloxacin HCl (CIPRO) 500 MG tablet Take 1 tablet (500 mg total) by mouth 2 (two) times daily.    ciprofloxacin HCl (CIPRO) 500 MG tablet Take 1 tablet (500 mg total) by mouth 2 (two) times daily.    clotrimazole-betamethasone 1-0.05% (LOTRISONE) cream Apply topically 2 (two) times daily.    fluconazole (DIFLUCAN) 200 MG Tab Take 1 tablet (200 mg total) by mouth once daily.    levothyroxine (SYNTHROID) 25 MCG tablet TAKE 1 TABLET EVERY DAY    meclizine (ANTIVERT) 25 mg tablet TK 1 T PO BID FOR 10 DAYS PRF DIZZINESS    ondansetron (ZOFRAN) 8 MG tablet Take 1 tablet (8 mg total) by mouth every 12 (twelve) hours as needed for Nausea.    pantoprazole (PROTONIX) 40 MG tablet Take 1 tablet (40 mg total) by mouth once daily. While on prednisone    valsartan (DIOVAN) 160 MG tablet Take 1 tablet (160 mg total) by mouth once daily.    verapamil (CALAN-SR) 240 MG CR tablet TAKE 1 TABLET EVERY DAY    VITAMIN B-1 100 MG tablet     colchicine 0.6 mg tablet Take 1 tablet (0.6 mg total) by mouth once daily.     Current Facility-Administered Medications   Medication    sodium chloride 0.9% flush 10 mL     ALLERGIES:   Review of patient's allergies indicates:   Allergen Reactions    Sulfa (sulfonamide antibiotics) Itching and Rash           REVIEW OF SYSTEMS:   Review of Systems - General ROS: negative  Psychological ROS:  "negative  Ophthalmic ROS: negative  Allergy and Immunology ROS: negative  Breast ROS: negative  Respiratory ROS: negative  Cardiovascular ROS: negative  Gastrointestinal ROS: negative  Genito-Urinary ROS: negative  Musculoskeletal ROS: negative  Neurological ROS: negative  Dermatological ROS: negative    PHYSICAL EXAM:   Vitals:    02/20/18 1049   BP: (!) 189/79   Pulse: 61   Resp: 18   Temp: 98 °F (36.7 °C)   TempSrc: Oral   SpO2: 100%   Weight: 57.6 kg (126 lb 15.8 oz)   Height: 5' 5" (1.651 m)   PainSc: 0-No pain     General - well developed, well nourished, no apparent distress  HEENT - oropharynx clear  Chest and Lung - clear to auscultation bilaterally   Cardiovascular - RRR with no MGR, normal S1 and S2  Abdomen-  soft, nontender, no palpable hepatomegaly or splenomegaly  Lymph - no palpable lymphadenopathy  Heme - no bruising, petechiae, pallor  Skin - no rashes or lesions  Psych - appropriate mood and affect      ECOG Performance Status: (foot note - ECOG PS provided by Eastern Cooperative Oncology Group) 1 - Symptomatic but completely ambulatory    Karnofsky Performance Score:  80%- Normal Activity with Effort: Some Symptoms of Disease  DATA:   Lab Results   Component Value Date    WBC 0.74 (LL) 02/20/2018    HGB 8.5 (L) 02/20/2018    HCT 25.4 (L) 02/20/2018    MCV 87 02/20/2018    PLT 15 (LL) 02/20/2018     Gran # (ANC)   Date Value Ref Range Status   02/20/2018 0.1 (L) 1.8 - 7.7 K/uL Final     Gran%   Date Value Ref Range Status   02/20/2018 9.4 (L) 38.0 - 73.0 % Final     Lymph #   Date Value Ref Range Status   02/20/2018 0.5 (L) 1.0 - 4.8 K/uL Final     Lymph%   Date Value Ref Range Status   02/20/2018 68.9 (H) 18.0 - 48.0 % Final     CMP  Sodium   Date Value Ref Range Status   02/20/2018 143 136 - 145 mmol/L Final     Potassium   Date Value Ref Range Status   02/20/2018 3.7 3.5 - 5.1 mmol/L Final     Chloride   Date Value Ref Range Status   02/20/2018 108 95 - 110 mmol/L Final     CO2   Date Value Ref " Range Status   02/20/2018 28 23 - 29 mmol/L Final     Glucose   Date Value Ref Range Status   02/20/2018 93 70 - 110 mg/dL Final     BUN, Bld   Date Value Ref Range Status   02/20/2018 18 8 - 23 mg/dL Final     Creatinine   Date Value Ref Range Status   02/20/2018 1.1 0.5 - 1.4 mg/dL Final     Calcium   Date Value Ref Range Status   02/20/2018 9.8 8.7 - 10.5 mg/dL Final     Total Protein   Date Value Ref Range Status   02/20/2018 8.2 6.0 - 8.4 g/dL Final     Albumin   Date Value Ref Range Status   02/20/2018 3.0 (L) 3.5 - 5.2 g/dL Final     Total Bilirubin   Date Value Ref Range Status   02/20/2018 0.6 0.1 - 1.0 mg/dL Final     Comment:     For infants and newborns, interpretation of results should be based  on gestational age, weight and in agreement with clinical  observations.  Premature Infant recommended reference ranges:  Up to 24 hours.............<8.0 mg/dL  Up to 48 hours............<12.0 mg/dL  3-5 days..................<15.0 mg/dL  6-29 days.................<15.0 mg/dL       Alkaline Phosphatase   Date Value Ref Range Status   02/20/2018 152 (H) 55 - 135 U/L Final     AST   Date Value Ref Range Status   02/20/2018 21 10 - 40 U/L Final     ALT   Date Value Ref Range Status   02/20/2018 11 10 - 44 U/L Final     Anion Gap   Date Value Ref Range Status   02/20/2018 7 (L) 8 - 16 mmol/L Final     eGFR if    Date Value Ref Range Status   02/20/2018 54.4 (A) >60 mL/min/1.73 m^2 Final     eGFR if non    Date Value Ref Range Status   02/20/2018 47.2 (A) >60 mL/min/1.73 m^2 Final     Comment:     Calculation used to obtain the estimated glomerular filtration  rate (eGFR) is the CKD-EPI equation.        LD   Date Value Ref Range Status   08/03/2017 184 110 - 260 U/L Final     Comment:     Results are increased in hemolyzed samples.     Lab Results   Component Value Date    IRON 122 08/03/2017    TIBC 209 (L) 08/03/2017    FERRITIN 576 (H) 08/03/2017     Lab Results   Component Value  Date    [id number] 362 08/03/2017     Lab Results   Component Value Date    FOLATE 35.2 (H) 08/03/2017   Pathologist Interpretation TAHIR   Pathologist Interpretation TAHIR   Collected: 08/03/17 1522   Resulting lab: [facility]   Value: REVIEWED   Comment: Electronically reviewed and signed by:   Rhianna Mao MD   Signed on 08/04/17 at 13:32   There is a very faint IgA kappa monoclonal band in beta. - SPEP negative     HEMOGLOBIN ELECTROPHORESIS,HGB A2 UGO.   Order: [id number]   Status:  Final result   Visible to patient:  No (Not Released) Next appt:  None Dx:  Anemia     Ref Range & Units 3yr ago 6yr ago    Hgb A2 Quant 1.5 - 3.8 % 2.4 2.2CM    Hemoglobin Bands   Hb A , Hb S and Hb A2 textCM    Hemoglobin Electrophoresis Interp   See comment    Comments: Hemoglobins A and S are present, measuring approximately 62% and 37%   respectively.  This pattern is compatible with the patient's history   of sickle cell trait, provided the patient has no recent history of   red cell transfusion.  Correlate clinically.   Interpreted by Rhianna Mao M.D.                  Bone Marrow biopsy - 8/7/17  SPECIMEN  1) Bone marrow clot, left iliac crest.  2) Bone marrow core biopsy, left iliac crest.  3) Bone Marrow Aspirate (Slides)  FINAL PATHOLOGIC DIAGNOSIS  LEFT ILIAC CREST BONE MARROW ASPIRATE SMEAR, CLOT SECTION, AND CORE BIOPSY:  -- HYPERCELLULAR MARROW FOR AGE (70%) WITH ERYTHROID HYPERPLASIA, RING SIDEROBLASTS  AND ATYPICAL MEGAKARYOCYTIC PROLIFERATION, HIGHLY SUGGESTIVE OF A MYELODYSPLASTIC  SYNDROME.  -- INCREASED IRON STORAGE.  -- SEE COMMENT.  COMMENT:  CBC data (8/3/2017): WBC 2.31 (granulocyte 22.9%, lymphocyte 55%, monocyte 5.2%, eosinophil 16.9%), RBC  2.66, HGB 8.5, HCT 24.7, MCV 93, MCHC 34.4, PLT 24.  Peripheral blood smear is not submitted for review.  Flow cytometric analysis of bone marrow shows populations of polyclonal B lymphocytes and T lymphocytes that  are  immunophenotypically unremarkable. The blast gate is not increased.  Immunohistochemical stains are performed on the biopsy core for greater sensitivity and further architectural  assessment with adequate controls. CD34 highlights rare immature mononuclear cells. The early erythroid  precursors are positive for E-cadherin. CD61 highlights markedly increased megakaryocytes with frequent small  forms. The small lymphoid aggregates are composed of a mixture of CD3- positive T-cells and CD20-positive  B-cells.  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  Correlation with cytogenetics is critical. Close clinical follow-up is required.  Diagnosed by: Gio Prado  (Electronically Signed: 2017-08-10 15:02:16)  Microscopic Examination  BONE MARROW ASPIRATE DIFFERENTIAL:  Blasts----------------------------------------------1%  Promyelocytes---------------------------------0%  Myelocytes--------------------------------------3%  Metamyelocytes------------------------------ 5%  Bands----------------------------------------------4%  PMN Neutrophils------------------------------6%  Eosinophils------------------------------------------2%  Basophils---------------------------------------0%  Monocytes------------------------------------1%  Lymphocytes-------------------------------------31%  Plasma cells------------------------------------------1%  Erythroid precursors--------------------------47%  BONE MARROW ASPIRATE SMEAR:  The smears contain cellular spicules. The myeloid to erythroid ratio is markedly decreased, approximately 0.5:1.  Some erythroid precursors display megaloblastoid change and a shift towards immaturity. Blasts are not increased.  Megakaryocytes are increased and display frequent atypical nuclear features including hypolobation and small  "forms  as well as occasional forms with  nuclei. Stainable iron is increased. Ring sideroblasts are seen,  approximately 30-35% of the erythroid precursors.  BONE MARROW CLOT SECTION:  The clot sections contain small spicules with cellular composition that is similar to the biopsy core. Stainable iron is  present.  BONE MARROW CORE BIOPSY:  Cortical and medullary bones are present. The cellularity is approximately 70%. The myeloid to erythroid ratio is  markedly decreased. Megakaryocytes are increased and form small clusters in areas. Small lymphoid aggregates  composed of small mature lymphocytes are seen. Stainable iron is present.    Supplemental Diagnosis -1/11/2018  Please see fluorescent in-situ hybridization (FISH) results reported in [facility] from [facility]  ([facility], [facility] [place] [address]):  "MDS FISH: Abnormal.  Interpretation:  Comments: The result is abnormal and indicates approximately 23% of nuclei had a 7q deletion and trisomy 8. 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 Since  trisomy 8 was not previously identified, this result may represent cytogenetic evolution; however, the significance of  the trisomy 8 is unclear. Clinical and hematopathologic correlation is recommended."  Please see cytogenetic karyotype results reported in [facility] from [facility] ([facility],  [facility] [place] [address]):  " 51,XX,+1,brenda(1;7)(q10;p10),+8,+14,+15,+18,+19[8].  The result is abnormal. Each of 8 available metaphases had an unbalanced 1;7 translocation resulting in a 1q  duplication and a 7q deletion and trisomy 8, 14, 15, 18 and 19 was also observed. 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 This result indicates persistence of this patient's myeloid clone. Since  these additional abnormalities were not previously " "observed, this result likely indicates clonal evolution. "  (Electronically Signed: 2018-01-22 13:57:43 )  Diagnosed by: Gio Prado  FINAL PATHOLOGIC DIAGNOSIS  BONE MARROW, LEFT ILIAC CREST (ASPIRATE SMEAR, TOUCH PREPARATION, CLOT SECTION, AND  CORE BIOPSY):  -- NORMOCELLULAR MARROW (20%) WITH MILD TRILINEAGE HYPOPLASIA AND LYMPHOCYTOSIS (SEE  COMMENT).  -- ADEQUATE IRON STORAGE.  COMMENT:  CBC data (1/9/2018): WBC 1.10 K/?L (granulocyte 38.1 %, lymphocyte 61.9 %, monocyte 0.0 %, eosinophil 0.0 %,  basophil 0.0 %), RBC 2.44 M/?L, HGB 7.0 g/dL, HCT 21.1 %, MCV 87 fL, MCH 28.7 pg, MCHC 33.2 g/dL, Platelet  3 K/?L.  Peripheral blood smear is not provided for review.  Flow cytometric analysis of bone marrow shows populations of polyclonal B lymphocytes. T lymphocytes show  reversed CD4:CD8 ratio. The blast gate is not increased.  Immunohistochemical stains are performed on the biopsy core for greater sensitivity and further architectural  assessment with adequate controls. CD34 stains rare blasts. The cellularity is composed of predominantly  CD3-positive T lymphocytes and fewer CD79a/PAX5-positive B lymphocytes. Myeloperoxidase and spectrin stain  decreased myeloid and erythroid precursors, respectively. TdT is negative.  Overall, the aspirate smear and the touch preparation is suboptimal for evaluation of dysplasia and ring  sideroblasts. Although the marrow is normocellular for age, mild trilineage hypoplasia is noted. Correlation with  other laboratory results including cytogenetics is recommended.  Diagnosed by: Gio Prado  (Electronically Signed: 2018-01-16 11:49:43)  ASSESSMENT AND PLAN:   Encounter Diagnoses   Name Primary?   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 ; Int 2 IPSS; Very high risk R-IPSS  -not a stem cell transplant candidate " due to age/comorbidities  -discussed terminal nature of diagnosis and recommendations for hypomethylating agent therapy  -discussed risks of disease associated with bone marrow failure and transformation to AML  -can consider addition of GASTON if no response in anemia to vidaza   -pancytopenia due to MDS and vidaza ; suspect anemia augmented buy underlying sickle trait ;  transfuse for plt >10, hgb >7; plan for 1 unit prbc and 1 unit plt today; given transfusion requirements plan for twice weekly labs and prn transfusions   -she has  completed 5 cycles of vidaza  ;   repeat bone marrow biopsy following cycle #4 showed stable morphologic findings but there has been cytogenetic clonal evolution   -discussed with patient that given limited therapeutic options following HMA failure, and that some possible delayed HMA response that I would recommend proceeding with another 2 cycles of vidaza and perform repeat bone marrow biopsy after cycle #6  -will decrease dose /schedule to 50mg/m2  day 1-5 of 28 day cycle to mitigate cytopenias and transfusion requirements   - erythorpoetin level >1000 so defer procrit   -reviewed neutropenic and bleeding precautions with patient  -prn zofran for home use for CINV   MGUS  -for IgA MGUS; anemia likely due to MDS; no hypercalcemia, Cr at baseline; will discuss skeletal survey at next appt but lack of clonal plasma cells in bone marrow make underlying myeloma highly unlikely; repeat myeloma studies  At next visit     2)ID  -completed course of levaquin for CAP on 10/25/17  -transition back to cipro ppx  -contineu acyc, fluc ppx     3)gout  -continue colchicine; improving     -continue all other chronic medications per pcp     Follow Up:  -q tues/friday cbc, cmp, type and screen (10am-11am as only time she can get rides) for next 4   weeks   -bone marrow biopsy in approximately 2-3 weeks  -MD appt after labs in 4 weeks    Ankit Wang MD  Hematology/Oncology/Bone Marrow Transplant

## 2018-02-20 NOTE — NURSING
Patient here for vidaza injections-given in 2 divided doses-no complaints today-tolerated injections well.

## 2018-02-20 NOTE — Clinical Note
-please move this Saturday injection to next Tuesday due to transportation issues  --q tues/friday cbc, cmp, type and screen (10am-11am as only time she can get rides) for next 4   weeks  -please schedule bone marrow biopsy under sedation in 3 weeks -same labs, MD appt in 4 weeks

## 2018-02-21 ENCOUNTER — TELEPHONE (OUTPATIENT)
Dept: HEMATOLOGY/ONCOLOGY | Facility: CLINIC | Age: 83
End: 2018-02-21

## 2018-02-21 ENCOUNTER — INFUSION (OUTPATIENT)
Dept: INFUSION THERAPY | Facility: HOSPITAL | Age: 83
End: 2018-02-21
Attending: INTERNAL MEDICINE
Payer: MEDICARE

## 2018-02-21 VITALS — SYSTOLIC BLOOD PRESSURE: 174 MMHG | HEART RATE: 61 BPM | DIASTOLIC BLOOD PRESSURE: 70 MMHG

## 2018-02-21 DIAGNOSIS — D46.9 MDS (MYELODYSPLASTIC SYNDROME): Primary | ICD-10-CM

## 2018-02-21 DIAGNOSIS — D61.818 PANCYTOPENIA: ICD-10-CM

## 2018-02-21 PROCEDURE — 63600175 PHARM REV CODE 636 W HCPCS: Mod: JG | Performed by: INTERNAL MEDICINE

## 2018-02-21 PROCEDURE — 96401 CHEMO ANTI-NEOPL SQ/IM: CPT

## 2018-02-21 RX ORDER — AZACITIDINE 100 MG/1
50 INJECTION, POWDER, LYOPHILIZED, FOR SOLUTION INTRAVENOUS; SUBCUTANEOUS
Status: COMPLETED | OUTPATIENT
Start: 2018-02-21 | End: 2018-02-21

## 2018-02-21 RX ADMIN — AZACITIDINE 80 MG: 100 INJECTION, POWDER, LYOPHILIZED, FOR SOLUTION SUBCUTANEOUS at 12:02

## 2018-02-22 ENCOUNTER — INFUSION (OUTPATIENT)
Dept: INFUSION THERAPY | Facility: HOSPITAL | Age: 83
End: 2018-02-22
Attending: INTERNAL MEDICINE
Payer: MEDICARE

## 2018-02-22 VITALS — DIASTOLIC BLOOD PRESSURE: 63 MMHG | RESPIRATION RATE: 18 BRPM | HEART RATE: 68 BPM | SYSTOLIC BLOOD PRESSURE: 143 MMHG

## 2018-02-22 DIAGNOSIS — D46.9 MDS (MYELODYSPLASTIC SYNDROME): Primary | ICD-10-CM

## 2018-02-22 PROCEDURE — 63600175 PHARM REV CODE 636 W HCPCS: Mod: JG | Performed by: INTERNAL MEDICINE

## 2018-02-22 PROCEDURE — 96401 CHEMO ANTI-NEOPL SQ/IM: CPT

## 2018-02-22 RX ORDER — AZACITIDINE 100 MG/1
50 INJECTION, POWDER, LYOPHILIZED, FOR SOLUTION INTRAVENOUS; SUBCUTANEOUS
Status: COMPLETED | OUTPATIENT
Start: 2018-02-22 | End: 2018-02-22

## 2018-02-22 RX ADMIN — AZACITIDINE 80 MG: 100 INJECTION, POWDER, LYOPHILIZED, FOR SOLUTION INTRAVENOUS; SUBCUTANEOUS at 01:02

## 2018-02-23 ENCOUNTER — INFUSION (OUTPATIENT)
Dept: INFUSION THERAPY | Facility: HOSPITAL | Age: 83
End: 2018-02-23
Attending: INTERNAL MEDICINE
Payer: MEDICARE

## 2018-02-23 ENCOUNTER — TELEPHONE (OUTPATIENT)
Dept: HEMATOLOGY/ONCOLOGY | Facility: CLINIC | Age: 83
End: 2018-02-23

## 2018-02-23 VITALS
DIASTOLIC BLOOD PRESSURE: 83 MMHG | TEMPERATURE: 98 F | RESPIRATION RATE: 18 BRPM | HEART RATE: 67 BPM | SYSTOLIC BLOOD PRESSURE: 194 MMHG

## 2018-02-23 DIAGNOSIS — D46.9 MDS (MYELODYSPLASTIC SYNDROME): Primary | ICD-10-CM

## 2018-02-23 DIAGNOSIS — D46.9 MDS (MYELODYSPLASTIC SYNDROME): ICD-10-CM

## 2018-02-23 PROCEDURE — 63600175 PHARM REV CODE 636 W HCPCS: Mod: JG | Performed by: INTERNAL MEDICINE

## 2018-02-23 PROCEDURE — 36430 TRANSFUSION BLD/BLD COMPNT: CPT

## 2018-02-23 PROCEDURE — P9037 PLATE PHERES LEUKOREDU IRRAD: HCPCS

## 2018-02-23 PROCEDURE — 96401 CHEMO ANTI-NEOPL SQ/IM: CPT

## 2018-02-23 PROCEDURE — 25000003 PHARM REV CODE 250: Performed by: NURSE PRACTITIONER

## 2018-02-23 RX ORDER — HYDROCODONE BITARTRATE AND ACETAMINOPHEN 500; 5 MG/1; MG/1
TABLET ORAL ONCE
Status: CANCELLED | OUTPATIENT
Start: 2018-02-23 | End: 2018-02-23

## 2018-02-23 RX ORDER — ACETAMINOPHEN 325 MG/1
650 TABLET ORAL
Status: COMPLETED | OUTPATIENT
Start: 2018-02-23 | End: 2018-02-23

## 2018-02-23 RX ORDER — AZACITIDINE 100 MG/1
50 INJECTION, POWDER, LYOPHILIZED, FOR SOLUTION INTRAVENOUS; SUBCUTANEOUS
Status: COMPLETED | OUTPATIENT
Start: 2018-02-23 | End: 2018-02-23

## 2018-02-23 RX ORDER — ACETAMINOPHEN 325 MG/1
650 TABLET ORAL
Status: CANCELLED | OUTPATIENT
Start: 2018-02-23

## 2018-02-23 RX ORDER — HYDROCODONE BITARTRATE AND ACETAMINOPHEN 500; 5 MG/1; MG/1
TABLET ORAL ONCE
Status: COMPLETED | OUTPATIENT
Start: 2018-02-23 | End: 2018-02-23

## 2018-02-23 RX ORDER — DIPHENHYDRAMINE HCL 25 MG
25 CAPSULE ORAL
Status: COMPLETED | OUTPATIENT
Start: 2018-02-23 | End: 2018-02-23

## 2018-02-23 RX ORDER — DIPHENHYDRAMINE HCL 25 MG
25 CAPSULE ORAL
Status: CANCELLED | OUTPATIENT
Start: 2018-02-23

## 2018-02-23 RX ADMIN — SODIUM CHLORIDE: 9 INJECTION, SOLUTION INTRAVENOUS at 11:02

## 2018-02-23 RX ADMIN — DIPHENHYDRAMINE HYDROCHLORIDE 25 MG: 25 CAPSULE ORAL at 11:02

## 2018-02-23 RX ADMIN — AZACITIDINE 80 MG: 100 INJECTION, POWDER, LYOPHILIZED, FOR SOLUTION INTRAVENOUS; SUBCUTANEOUS at 11:02

## 2018-02-23 RX ADMIN — ACETAMINOPHEN 650 MG: 325 TABLET, FILM COATED ORAL at 11:02

## 2018-02-23 NOTE — PROGRESS NOTES
Reviewed labs. [facility] for Vidaza with current labs per Dr. Allen. Ordered 1 unit platelets.     Nidhi Bai, KOKI, NP  Hematology/Oncology

## 2018-02-23 NOTE — NURSING
Pt received 1 unit plt and vidaza subcu without complications. VSS. No s/s of reaction. AVS given to patient.

## 2018-02-27 ENCOUNTER — TELEPHONE (OUTPATIENT)
Dept: HEMATOLOGY/ONCOLOGY | Facility: CLINIC | Age: 83
End: 2018-02-27

## 2018-02-27 ENCOUNTER — INFUSION (OUTPATIENT)
Dept: INFUSION THERAPY | Facility: HOSPITAL | Age: 83
End: 2018-02-27
Attending: INTERNAL MEDICINE
Payer: MEDICARE

## 2018-02-27 VITALS
RESPIRATION RATE: 16 BRPM | WEIGHT: 127 LBS | TEMPERATURE: 98 F | DIASTOLIC BLOOD PRESSURE: 81 MMHG | SYSTOLIC BLOOD PRESSURE: 196 MMHG | BODY MASS INDEX: 21.16 KG/M2 | HEART RATE: 79 BPM | HEIGHT: 65 IN

## 2018-02-27 VITALS
HEART RATE: 66 BPM | TEMPERATURE: 98 F | DIASTOLIC BLOOD PRESSURE: 78 MMHG | RESPIRATION RATE: 20 BRPM | SYSTOLIC BLOOD PRESSURE: 183 MMHG

## 2018-02-27 DIAGNOSIS — D46.9 MDS (MYELODYSPLASTIC SYNDROME): ICD-10-CM

## 2018-02-27 DIAGNOSIS — D46.9 MDS (MYELODYSPLASTIC SYNDROME): Primary | ICD-10-CM

## 2018-02-27 PROCEDURE — P9038 RBC IRRADIATED: HCPCS

## 2018-02-27 PROCEDURE — 25000003 PHARM REV CODE 250: Performed by: INTERNAL MEDICINE

## 2018-02-27 PROCEDURE — 36430 TRANSFUSION BLD/BLD COMPNT: CPT

## 2018-02-27 PROCEDURE — 86920 COMPATIBILITY TEST SPIN: CPT

## 2018-02-27 PROCEDURE — 63600175 PHARM REV CODE 636 W HCPCS: Mod: JG | Performed by: INTERNAL MEDICINE

## 2018-02-27 PROCEDURE — 96401 CHEMO ANTI-NEOPL SQ/IM: CPT

## 2018-02-27 PROCEDURE — 27201040 HC RC 50 FILTER

## 2018-02-27 RX ORDER — AZACITIDINE 100 MG/1
50 INJECTION, POWDER, LYOPHILIZED, FOR SOLUTION INTRAVENOUS; SUBCUTANEOUS
Status: COMPLETED | OUTPATIENT
Start: 2018-02-27 | End: 2018-02-27

## 2018-02-27 RX ORDER — DIPHENHYDRAMINE HCL 25 MG
25 CAPSULE ORAL
Status: COMPLETED | OUTPATIENT
Start: 2018-02-27 | End: 2018-02-27

## 2018-02-27 RX ORDER — DIPHENHYDRAMINE HCL 25 MG
25 CAPSULE ORAL
Status: CANCELLED | OUTPATIENT
Start: 2018-02-27

## 2018-02-27 RX ORDER — HYDROCODONE BITARTRATE AND ACETAMINOPHEN 500; 5 MG/1; MG/1
TABLET ORAL ONCE
Status: DISCONTINUED | OUTPATIENT
Start: 2018-02-27 | End: 2018-02-27 | Stop reason: HOSPADM

## 2018-02-27 RX ORDER — ACETAMINOPHEN 325 MG/1
650 TABLET ORAL
Status: COMPLETED | OUTPATIENT
Start: 2018-02-27 | End: 2018-02-27

## 2018-02-27 RX ORDER — HYDROCODONE BITARTRATE AND ACETAMINOPHEN 500; 5 MG/1; MG/1
TABLET ORAL ONCE
Status: CANCELLED | OUTPATIENT
Start: 2018-02-27 | End: 2018-02-27

## 2018-02-27 RX ORDER — ACETAMINOPHEN 325 MG/1
650 TABLET ORAL
Status: CANCELLED | OUTPATIENT
Start: 2018-02-27

## 2018-02-27 RX ADMIN — AZACITIDINE 80 MG: 100 INJECTION, POWDER, LYOPHILIZED, FOR SOLUTION INTRAVENOUS; SUBCUTANEOUS at 11:02

## 2018-02-27 RX ADMIN — DIPHENHYDRAMINE HYDROCHLORIDE 25 MG: 25 CAPSULE ORAL at 12:02

## 2018-02-27 RX ADMIN — ACETAMINOPHEN 650 MG: 325 TABLET, FILM COATED ORAL at 12:02

## 2018-02-27 NOTE — NURSING
Patient here for Vidaza injection.  Assessment complete and labs reviewed.  BP elevated; patient states she has not eaten and can not take BP medication on empty stomach.  Advised patient to take BP medication as soon as she leaves here.  Patient verbalized understanding. Administered Vidaza to abdomen.  No questions or concerns.  Patient ambulated off unit unassisted.

## 2018-02-28 ENCOUNTER — TELEPHONE (OUTPATIENT)
Dept: HEMATOLOGY/ONCOLOGY | Facility: CLINIC | Age: 83
End: 2018-02-28

## 2018-02-28 NOTE — PLAN OF CARE
Problem: Patient Care Overview  Goal: Plan of Care Review  Outcome: Ongoing (interventions implemented as appropriate)  Pt tolerated 1u PRBC without adverse effects. VSS. Provided AVS & verbalized understanding of RTC date. DC with family ambulating independently.          shortness of breath

## 2018-02-28 NOTE — TELEPHONE ENCOUNTER
----- Message from Juan Wang MD sent at 2/23/2018  1:15 PM CST -----  Can you please place orders for 1 unit plt  Thank you   ----- Message -----  From: Renard, [facility] Interface  Sent: 2/23/2018  10:46 AM  To: Juan Wang MD    Pt received one unit of blood on 02/27/18.   Hina

## 2018-03-01 ENCOUNTER — TELEPHONE (OUTPATIENT)
Dept: HEMATOLOGY/ONCOLOGY | Facility: CLINIC | Age: 83
End: 2018-03-01

## 2018-03-01 NOTE — TELEPHONE ENCOUNTER
----- Message from Juan Wang MD sent at 2/28/2018  4:58 PM CST -----  Pt will need 1 unit prbc transfused.   Please schedule for tomorrow.  Thank you   ----- Message -----  From: Renard, [facility] Interface  Sent: 2/27/2018   9:05 AM  To: Juan Wang MD    Pt received one unit of blood on 02/27/18  [facility]

## 2018-03-02 DIAGNOSIS — D46.9 MDS (MYELODYSPLASTIC SYNDROME): Primary | ICD-10-CM

## 2018-03-06 ENCOUNTER — TELEPHONE (OUTPATIENT)
Dept: HEMATOLOGY/ONCOLOGY | Facility: CLINIC | Age: 83
End: 2018-03-06

## 2018-03-06 ENCOUNTER — INFUSION (OUTPATIENT)
Dept: INFUSION THERAPY | Facility: HOSPITAL | Age: 83
End: 2018-03-06
Attending: INTERNAL MEDICINE
Payer: MEDICARE

## 2018-03-06 VITALS
RESPIRATION RATE: 18 BRPM | SYSTOLIC BLOOD PRESSURE: 160 MMHG | TEMPERATURE: 98 F | DIASTOLIC BLOOD PRESSURE: 80 MMHG | HEART RATE: 70 BPM

## 2018-03-06 DIAGNOSIS — D61.818 PANCYTOPENIA: Primary | ICD-10-CM

## 2018-03-06 DIAGNOSIS — D64.9 ANEMIA: Primary | ICD-10-CM

## 2018-03-06 PROCEDURE — 86920 COMPATIBILITY TEST SPIN: CPT

## 2018-03-06 PROCEDURE — P9040 RBC LEUKOREDUCED IRRADIATED: HCPCS

## 2018-03-06 PROCEDURE — P9037 PLATE PHERES LEUKOREDU IRRAD: HCPCS

## 2018-03-06 PROCEDURE — 36430 TRANSFUSION BLD/BLD COMPNT: CPT

## 2018-03-06 PROCEDURE — 25000003 PHARM REV CODE 250: Performed by: INTERNAL MEDICINE

## 2018-03-06 RX ORDER — ACETAMINOPHEN 325 MG/1
650 TABLET ORAL
Status: COMPLETED | OUTPATIENT
Start: 2018-03-06 | End: 2018-03-06

## 2018-03-06 RX ORDER — HYDROCODONE BITARTRATE AND ACETAMINOPHEN 500; 5 MG/1; MG/1
TABLET ORAL ONCE
Status: DISCONTINUED | OUTPATIENT
Start: 2018-03-06 | End: 2018-03-06 | Stop reason: HOSPADM

## 2018-03-06 RX ORDER — HYDROCODONE BITARTRATE AND ACETAMINOPHEN 500; 5 MG/1; MG/1
TABLET ORAL ONCE
Status: CANCELLED | OUTPATIENT
Start: 2018-03-06 | End: 2018-03-06

## 2018-03-06 RX ORDER — DIPHENHYDRAMINE HYDROCHLORIDE 50 MG/ML
25 INJECTION INTRAMUSCULAR; INTRAVENOUS
Status: CANCELLED | OUTPATIENT
Start: 2018-03-06

## 2018-03-06 RX ORDER — DIPHENHYDRAMINE HCL 25 MG
25 CAPSULE ORAL
Status: COMPLETED | OUTPATIENT
Start: 2018-03-06 | End: 2018-03-06

## 2018-03-06 RX ORDER — ACETAMINOPHEN 325 MG/1
650 TABLET ORAL
Status: CANCELLED | OUTPATIENT
Start: 2018-03-06

## 2018-03-06 RX ADMIN — ACETAMINOPHEN 650 MG: 325 TABLET, FILM COATED ORAL at 12:03

## 2018-03-06 RX ADMIN — DIPHENHYDRAMINE HYDROCHLORIDE 25 MG: 25 CAPSULE ORAL at 12:03

## 2018-03-06 NOTE — PLAN OF CARE
Problem: Anemia (Adult)  Goal: Identify Related Risk Factors and Signs and Symptoms  Related risk factors and signs and symptoms are identified upon initiation of Human Response Clinical Practice Guideline (CPG)   Outcome: Ongoing (interventions implemented as appropriate)  Pt here for 1 unit prc's and 1 unit platelets, labs, hx, meds, allergies reviewed. Reclined in chair, warm blanket provided, continue to monitor

## 2018-03-06 NOTE — PLAN OF CARE
Problem: Patient Care Overview  Goal: Plan of Care Review  Outcome: Ongoing (interventions implemented as appropriate)  Pt tolerated 1 unit prc and 1 unit platelets without issue, no distress noted upon d/c to home

## 2018-03-13 ENCOUNTER — INFUSION (OUTPATIENT)
Dept: INFUSION THERAPY | Facility: HOSPITAL | Age: 83
End: 2018-03-13
Attending: INTERNAL MEDICINE
Payer: MEDICARE

## 2018-03-13 ENCOUNTER — TELEPHONE (OUTPATIENT)
Dept: HEMATOLOGY/ONCOLOGY | Facility: CLINIC | Age: 83
End: 2018-03-13

## 2018-03-13 VITALS
RESPIRATION RATE: 18 BRPM | TEMPERATURE: 98 F | SYSTOLIC BLOOD PRESSURE: 138 MMHG | HEART RATE: 69 BPM | DIASTOLIC BLOOD PRESSURE: 64 MMHG

## 2018-03-13 DIAGNOSIS — D46.9 MDS (MYELODYSPLASTIC SYNDROME): Primary | ICD-10-CM

## 2018-03-13 DIAGNOSIS — D61.818 PANCYTOPENIA: ICD-10-CM

## 2018-03-13 DIAGNOSIS — D46.9 MDS (MYELODYSPLASTIC SYNDROME): ICD-10-CM

## 2018-03-13 PROCEDURE — 86644 CMV ANTIBODY: CPT

## 2018-03-13 PROCEDURE — 25000003 PHARM REV CODE 250: Performed by: INTERNAL MEDICINE

## 2018-03-13 PROCEDURE — 36430 TRANSFUSION BLD/BLD COMPNT: CPT

## 2018-03-13 PROCEDURE — 63600175 PHARM REV CODE 636 W HCPCS: Performed by: INTERNAL MEDICINE

## 2018-03-13 PROCEDURE — P9037 PLATE PHERES LEUKOREDU IRRAD: HCPCS

## 2018-03-13 RX ORDER — HYDROCODONE BITARTRATE AND ACETAMINOPHEN 500; 5 MG/1; MG/1
TABLET ORAL ONCE
Status: CANCELLED | OUTPATIENT
Start: 2018-03-13 | End: 2018-03-13

## 2018-03-13 RX ORDER — DIPHENHYDRAMINE HYDROCHLORIDE 50 MG/ML
25 INJECTION INTRAMUSCULAR; INTRAVENOUS
Status: CANCELLED | OUTPATIENT
Start: 2018-03-13

## 2018-03-13 RX ORDER — ACETAMINOPHEN 325 MG/1
650 TABLET ORAL
Status: COMPLETED | OUTPATIENT
Start: 2018-03-13 | End: 2018-03-13

## 2018-03-13 RX ORDER — DIPHENHYDRAMINE HYDROCHLORIDE 50 MG/ML
25 INJECTION INTRAMUSCULAR; INTRAVENOUS
Status: COMPLETED | OUTPATIENT
Start: 2018-03-13 | End: 2018-03-13

## 2018-03-13 RX ORDER — HYDROCODONE BITARTRATE AND ACETAMINOPHEN 500; 5 MG/1; MG/1
TABLET ORAL ONCE
Status: COMPLETED | OUTPATIENT
Start: 2018-03-13 | End: 2018-03-13

## 2018-03-13 RX ORDER — ACETAMINOPHEN 325 MG/1
650 TABLET ORAL
Status: CANCELLED | OUTPATIENT
Start: 2018-03-13

## 2018-03-13 RX ADMIN — SODIUM CHLORIDE: 9 INJECTION, SOLUTION INTRAVENOUS at 11:03

## 2018-03-13 RX ADMIN — DIPHENHYDRAMINE HYDROCHLORIDE 25 MG: 50 INJECTION, SOLUTION INTRAMUSCULAR; INTRAVENOUS at 12:03

## 2018-03-13 RX ADMIN — ACETAMINOPHEN 650 MG: 325 TABLET, FILM COATED ORAL at 12:03

## 2018-03-13 NOTE — PLAN OF CARE
Problem: Patient Care Overview  Goal: Plan of Care Review  Outcome: Ongoing (interventions implemented as appropriate)  Transfusion completed, pt tolerated well; pt instructed to monitor for transfusion reaction, provided printed post-transfusion instructions and reviewed same; pt instructed to contact MD for any needs or concerns; printed AVS given to and reviewed with pt, pt verbalized understanding of all discussed and when to report next

## 2018-03-13 NOTE — PLAN OF CARE
Problem: Anemia (Adult)  Goal: Symptom Improvement  Patient will demonstrate the desired outcomes by discharge/transition of care.   Outcome: Ongoing (interventions implemented as appropriate)  Pt here for 1 unit platelets, accompanied by granddaughter, c/o feeling tired, weak x several days; pt reports not taking BP meds at home today as scheduled, pt now takes in chair; discussed transfusion procedure, possible side effects, what to report to RN, pt reports prior transfusion w/o incident, consent previously signed, all pt questions answered and pt agrees to proceed

## 2018-03-15 ENCOUNTER — ANESTHESIA (OUTPATIENT)
Dept: SURGERY | Facility: HOSPITAL | Age: 83
End: 2018-03-15
Payer: MEDICARE

## 2018-03-15 ENCOUNTER — ANESTHESIA EVENT (OUTPATIENT)
Dept: SURGERY | Facility: HOSPITAL | Age: 83
End: 2018-03-15
Payer: MEDICARE

## 2018-03-15 ENCOUNTER — HOSPITAL ENCOUNTER (OUTPATIENT)
Facility: HOSPITAL | Age: 83
Discharge: HOME | End: 2018-03-15
Attending: INTERNAL MEDICINE | Admitting: INTERNAL MEDICINE
Payer: MEDICARE

## 2018-03-15 VITALS
HEART RATE: 75 BPM | SYSTOLIC BLOOD PRESSURE: 161 MMHG | RESPIRATION RATE: 16 BRPM | OXYGEN SATURATION: 100 % | DIASTOLIC BLOOD PRESSURE: 74 MMHG | TEMPERATURE: 98 F | BODY MASS INDEX: 21.16 KG/M2 | HEIGHT: 65 IN | WEIGHT: 127 LBS

## 2018-03-15 DIAGNOSIS — D46.9 MDS (MYELODYSPLASTIC SYNDROME): ICD-10-CM

## 2018-03-15 LAB — BONE MARROW WRIGHT STAIN COMMENT: NORMAL

## 2018-03-15 PROCEDURE — 88342 IMHCHEM/IMCYTCHM 1ST ANTB: CPT | Performed by: PATHOLOGY

## 2018-03-15 PROCEDURE — 36000704 HC OR TIME LEV I 1ST 15 MIN: Performed by: INTERNAL MEDICINE

## 2018-03-15 PROCEDURE — 85097 BONE MARROW INTERPRETATION: CPT | Mod: ,,, | Performed by: PATHOLOGY

## 2018-03-15 PROCEDURE — 88341 IMHCHEM/IMCYTCHM EA ADD ANTB: CPT | Mod: 26,59,, | Performed by: PATHOLOGY

## 2018-03-15 PROCEDURE — 37000008 HC ANESTHESIA 1ST 15 MINUTES: Performed by: INTERNAL MEDICINE

## 2018-03-15 PROCEDURE — D9220A PRA ANESTHESIA: Mod: CRNA,,, | Performed by: NURSE ANESTHETIST, CERTIFIED REGISTERED

## 2018-03-15 PROCEDURE — 88342 IMHCHEM/IMCYTCHM 1ST ANTB: CPT | Mod: 26,59,, | Performed by: PATHOLOGY

## 2018-03-15 PROCEDURE — 88275 CYTOGENETICS 100-300: CPT | Mod: 59

## 2018-03-15 PROCEDURE — 37000009 HC ANESTHESIA EA ADD 15 MINS: Performed by: INTERNAL MEDICINE

## 2018-03-15 PROCEDURE — 88311 DECALCIFY TISSUE: CPT | Mod: 26,,, | Performed by: PATHOLOGY

## 2018-03-15 PROCEDURE — 88313 SPECIAL STAINS GROUP 2: CPT

## 2018-03-15 PROCEDURE — 25000003 PHARM REV CODE 250: Performed by: ANESTHESIOLOGY

## 2018-03-15 PROCEDURE — 88305 TISSUE EXAM BY PATHOLOGIST: CPT | Mod: 26,,, | Performed by: PATHOLOGY

## 2018-03-15 PROCEDURE — 88264 CHROMOSOME ANALYSIS 20-25: CPT

## 2018-03-15 PROCEDURE — 38222 DX BONE MARROW BX & ASPIR: CPT | Mod: LT,,, | Performed by: NURSE PRACTITIONER

## 2018-03-15 PROCEDURE — 88189 FLOWCYTOMETRY/READ 16 & >: CPT | Mod: ,,, | Performed by: PATHOLOGY

## 2018-03-15 PROCEDURE — 88271 CYTOGENETICS DNA PROBE: CPT

## 2018-03-15 PROCEDURE — 88184 FLOWCYTOMETRY/ TC 1 MARKER: CPT | Performed by: PATHOLOGY

## 2018-03-15 PROCEDURE — 88305 TISSUE EXAM BY PATHOLOGIST: CPT | Mod: 59 | Performed by: PATHOLOGY

## 2018-03-15 PROCEDURE — 88271 CYTOGENETICS DNA PROBE: CPT | Mod: 59

## 2018-03-15 PROCEDURE — 25000003 PHARM REV CODE 250: Performed by: NURSE PRACTITIONER

## 2018-03-15 PROCEDURE — 71000044 HC DOSC ROUTINE RECOVERY FIRST HOUR: Performed by: INTERNAL MEDICINE

## 2018-03-15 PROCEDURE — 88313 SPECIAL STAINS GROUP 2: CPT | Mod: 26,,, | Performed by: PATHOLOGY

## 2018-03-15 PROCEDURE — 63600175 PHARM REV CODE 636 W HCPCS: Performed by: NURSE ANESTHETIST, CERTIFIED REGISTERED

## 2018-03-15 PROCEDURE — 71000015 HC POSTOP RECOV 1ST HR: Performed by: INTERNAL MEDICINE

## 2018-03-15 PROCEDURE — 88185 FLOWCYTOMETRY/TC ADD-ON: CPT | Mod: 59 | Performed by: PATHOLOGY

## 2018-03-15 PROCEDURE — 36000705 HC OR TIME LEV I EA ADD 15 MIN: Performed by: INTERNAL MEDICINE

## 2018-03-15 PROCEDURE — 88364 INSITU HYBRIDIZATION (FISH): CPT | Mod: 26,,, | Performed by: PATHOLOGY

## 2018-03-15 PROCEDURE — 88365 INSITU HYBRIDIZATION (FISH): CPT | Mod: 26,,, | Performed by: PATHOLOGY

## 2018-03-15 PROCEDURE — 88237 TISSUE CULTURE BONE MARROW: CPT

## 2018-03-15 PROCEDURE — D9220A PRA ANESTHESIA: Mod: ANES,,, | Performed by: ANESTHESIOLOGY

## 2018-03-15 RX ORDER — LIDOCAINE HYDROCHLORIDE 10 MG/ML
1 INJECTION, SOLUTION EPIDURAL; INFILTRATION; INTRACAUDAL; PERINEURAL ONCE
Status: COMPLETED | OUTPATIENT
Start: 2018-03-15 | End: 2018-03-15

## 2018-03-15 RX ORDER — PROPOFOL 10 MG/ML
VIAL (ML) INTRAVENOUS CONTINUOUS PRN
Status: DISCONTINUED | OUTPATIENT
Start: 2018-03-15 | End: 2018-03-15

## 2018-03-15 RX ORDER — LIDOCAINE HYDROCHLORIDE 10 MG/ML
INJECTION, SOLUTION EPIDURAL; INFILTRATION; INTRACAUDAL; PERINEURAL
Status: DISCONTINUED | OUTPATIENT
Start: 2018-03-15 | End: 2018-03-15 | Stop reason: HOSPADM

## 2018-03-15 RX ORDER — PROPOFOL 10 MG/ML
VIAL (ML) INTRAVENOUS
Status: DISCONTINUED | OUTPATIENT
Start: 2018-03-15 | End: 2018-03-15

## 2018-03-15 RX ORDER — SODIUM CHLORIDE 9 MG/ML
INJECTION, SOLUTION INTRAVENOUS CONTINUOUS
Status: DISCONTINUED | OUTPATIENT
Start: 2018-03-15 | End: 2018-03-15 | Stop reason: HOSPADM

## 2018-03-15 RX ADMIN — SODIUM CHLORIDE: 0.9 INJECTION, SOLUTION INTRAVENOUS at 06:03

## 2018-03-15 RX ADMIN — PROPOFOL 150 MCG/KG/MIN: 10 INJECTION, EMULSION INTRAVENOUS at 07:03

## 2018-03-15 RX ADMIN — PROPOFOL 50 MG: 10 INJECTION, EMULSION INTRAVENOUS at 07:03

## 2018-03-15 RX ADMIN — LIDOCAINE HYDROCHLORIDE 1 MG: 10 INJECTION, SOLUTION EPIDURAL; INFILTRATION; INTRACAUDAL; PERINEURAL at 06:03

## 2018-03-15 RX ADMIN — PROPOFOL 20 MG: 10 INJECTION, EMULSION INTRAVENOUS at 07:03

## 2018-03-15 NOTE — DISCHARGE SUMMARY
[facility]  Bone Marrow Transplant  Discharge Summary      Patient Name: Shara Rose  MRN: [id number]  Admission Date: 3/15/2018  Hospital Length of Stay: 0 days  Discharge Date and Time:  03/15/2018 8:13 AM  Attending Physician: Juan Wang MD   Discharging Provider: Diana Teague NP  Primary Care Provider: Alana Carter MD      Procedure(s) (LRB):  BIOPSY-BONE MARROW (Left)     Hospital Course: Patient admitted to pre op today for a bone marrow aspiration and biopsy. Pt was consented for a bone marrow biopsy. Patient was sedated per anesthesia and a bone marrow biopsy and aspiration was performed in the OR (see procedure note). Patient was then transferred to post op and discharged home when appropriate per anesthesia.           Pending Diagnostic Studies:     None        Final Active Diagnoses:    Diagnosis Date Noted POA    MDS (myelodysplastic syndrome) [D46.9] 08/14/2017 Yes      Problems Resolved During this Admission:    Diagnosis Date Noted Date Resolved POA      Discharged Condition: good    Disposition: Home or Self Care    Follow Up: with Dr. Wang in BMT clinic    Patient Instructions:     Activity as tolerated     Notify your health care provider if you experience any of the following:  temperature >100.4     Notify your health care provider if you experience any of the following:  severe uncontrolled pain     Notify your health care provider if you experience any of the following:  redness, tenderness, or signs of infection (pain, swelling, redness, odor or green/yellow discharge around incision site)     Remove dressing in 24 hours       Medications:  Reconciled Home Medications:   Current Discharge Medication List      CONTINUE these medications which have NOT CHANGED    Details   !! ciprofloxacin HCl (CIPRO) 500 MG tablet Take 1 tablet (500 mg total) by mouth 2 (two) times daily.  Qty: 60 tablet, Refills: 0      levothyroxine (SYNTHROID) 25 MCG tablet  TAKE 1 TABLET EVERY DAY  Qty: 90 tablet, Refills: 4      ondansetron (ZOFRAN) 8 MG tablet Take 1 tablet (8 mg total) by mouth every 12 (twelve) hours as needed for Nausea.  Qty: 30 tablet, Refills: 2    Associated Diagnoses: CINV (chemotherapy-induced nausea and vomiting)      valsartan (DIOVAN) 160 MG tablet Take 1 tablet (160 mg total) by mouth once daily.  Qty: 30 tablet, Refills: 2      verapamil (CALAN-SR) 240 MG CR tablet TAKE 1 TABLET EVERY DAY  Qty: 90 tablet, Refills: 3      acyclovir (ZOVIRAX) 200 MG capsule Take 2 capsules (400 mg total) by mouth 2 (two) times daily.  Qty: 120 capsule, Refills: 11    Associated Diagnoses: Neutropenia, unspecified type      allopurinol (ZYLOPRIM) 100 MG tablet Take 1 tablet (100 mg total) by mouth once daily. For gout  Qty: 30 tablet, Refills: 2      AZACITIDINE (VIDAZA INJ) Inject as directed.      !! ciprofloxacin HCl (CIPRO) 500 MG tablet Take 1 tablet (500 mg total) by mouth 2 (two) times daily.  Qty: 60 tablet, Refills: 2    Associated Diagnoses: Neutropenia, unspecified type      clotrimazole-betamethasone 1-0.05% (LOTRISONE) cream Apply topically 2 (two) times daily.  Qty: 45 g, Refills: 3      colchicine 0.6 mg tablet Take 1 tablet (0.6 mg total) by mouth once daily.  Qty: 10 tablet, Refills: 0      meclizine (ANTIVERT) 25 mg tablet TK 1 T PO BID FOR 10 DAYS PRF DIZZINESS  Refills: 1      pantoprazole (PROTONIX) 40 MG tablet Take 1 tablet (40 mg total) by mouth once daily. While on prednisone  Qty: 7 tablet, Refills: 0      VITAMIN B-1 100 MG tablet        !! - Potential duplicate medications found. Please discuss with provider.          Diana Teague NP  Bone Marrow Transplant  [facility]

## 2018-03-15 NOTE — ANESTHESIA PREPROCEDURE EVALUATION
03/15/2018  Shara Rose is a 82 y.o., female.  Pre-operative evaluation for BIOPSY-BONE MARROW (Left)    Chief Complaint:    PMH:  Patient Active Problem List   Diagnosis    Left hip pain    Nuclear sclerosis    Hypertension    Hypothyroidism    Stage 2 chronic kidney disease    MDS (myelodysplastic syndrome)    Neutropenic fever    PNA (pneumonia)    Lung consolidation    Debility    Cellulitis    Acute gout involving toe of right foot    Pancytopenia         Past Surgical History:   Procedure Laterality Date    APPENDECTOMY      HEMORRHOID SURGERY      HYSTERECTOMY           Vital Signs Range (Last 24H):  Temp:  [37 °C (98.6 °F)]   Pulse:  [82]   Resp:  [17]   BP: (168)/(70)   SpO2:  [100 %]       CBC:       Recent Labs  Lab 02/15/18  1051 02/20/18  0946 02/23/18  1018 02/27/18  0844 03/02/18  1000 03/06/18  1029 03/09/18  1002 03/13/18  1012   WBC 0.91* 0.74* 0.76* 0.68* 0.61* 0.62* 0.57* 0.47*   RBC 2.44* 2.93* 2.68* 2.43* 2.88* 2.44* 2.99* 2.59*   HGB 6.9* 8.5* 7.8* 6.8* 8.3* 7.0* 8.3* 7.1*   HCT 20.9* 25.4* 23.1* 20.4* 24.3* 20.8* 25.0* 21.5*   PLT 1* 15* 3* 37* 15* 2* 31* 3*   MCV 86 87 86 84 84 85 84 83   MCH 28.3 29.0 29.1 28.0 28.8 28.7 27.8 27.4   MCHC 33.0 33.5 33.8 33.3 34.2 33.7 33.2 33.0       CMP:     Recent Labs  Lab 02/15/18  1051 02/20/18  0946 02/23/18  1018 02/27/18  0844 03/02/18  1000 03/06/18  1029 03/09/18  1002 03/13/18  1012    143 140 142 141 141 144 139   K 3.8 3.7 3.7 3.5 3.1* 3.8 3.9 3.2*    108 108 109 106 108 106 103   CO2 28 28 26 26 28 27 30* 29   BUN 23 18 20 16 14 17 17 14   CREATININE 1.2 1.1 1.4 1.1 1.2 1.2 1.3 1.3   GLU 90 93 94 110 85 98 107 147*   CALCIUM 9.5 9.8 9.9 9.6 10.2 9.9 10.9* 10.3   ALBUMIN 3.0* 3.0* 3.2* 3.0* 3.3* 3.0* 3.0* 2.4*   PROT 8.0 8.2 8.3 7.8 8.3 7.6 8.3 7.9   ALKPHOS 157* 152* 163* 137* 139* 142* 142* 182*   ALT 9*  11 13 10 11 9* 14 12   AST 18 21 25 22 19 16 23 22   BILITOT 0.5 0.6 0.4 0.4 0.5 0.5 0.6 0.8       INR:  No results for input(s): PT, INR, PROTIME, APTT in the last 720 hours.      Diagnostic Studies:      EKD Echo:    Pre-op Assessment    I have reviewed the Patient Summary Reports.     I have reviewed the Nursing Notes.   I have reviewed the Medications.     Review of Systems  Anesthesia Hx:  No problems with previous Anesthesia    Social:  Non-Smoker    Hematology/Oncology:     Oncology Normal   Hematology Comments: Pancytopenia   Sickle cell trait   EENT/Dental:EENT/Dental Normal   Cardiovascular:   Hypertension    Renal/:   Chronic Renal Disease, CRI    Hepatic/GI:  Hepatic/GI Normal    Musculoskeletal:   Arthritis     Neurological:  Neurology Normal    Endocrine:   Hypothyroidism    Dermatological:  Skin Normal    Psych:  Psychiatric Normal           Physical Exam  General:  Well nourished    Airway/Jaw/Neck:  Airway Findings: Mouth Opening: Normal Tongue: Normal  General Airway Assessment: Adult  Mallampati: II  Improves to II with phonation.  TM Distance: Normal, at least 6 cm      Dental:  Dental Findings:   Chest/Lungs:  Chest/Lungs Findings: Clear to auscultation     Heart/Vascular:  Heart Findings: Rate: Normal  Rhythm: Regular Rhythm  Sounds: Normal        Mental Status:  Mental Status Findings:  Cooperative, Alert and Oriented         Anesthesia Plan  Type of Anesthesia, risks & benefits discussed:  Anesthesia Type:  general  Patient's Preference:   Intra-op Monitoring Plan:   Intra-op Monitoring Plan Comments:   Post Op Pain Control Plan:   Post Op Pain Control Plan Comments:   Induction:   IV  Beta Blocker:  Patient is not currently on a Beta-Blocker (No further documentation required).       Informed Consent: Patient understands risks and agrees with Anesthesia plan.  Questions answered. Anesthesia consent signed with patient.  ASA Score: 3     Day of Surgery Review of History & Physical:     H&P update referred to the provider.         Ready For Surgery From Anesthesia Perspective.

## 2018-03-15 NOTE — TRANSFER OF CARE
"Anesthesia Transfer of Care Note    Patient: Shara Rose    Procedure(s) Performed: Procedure(s) (LRB):  BIOPSY-BONE MARROW (Left)    Patient location: [facility]    Anesthesia Type: general    Transport from OR: Transported from OR on 6-10 L/min O2 by face mask with adequate spontaneous ventilation    Post pain: adequate analgesia    Post assessment: tolerated procedure well and no apparent anesthetic complications    Post vital signs: stable    Level of consciousness: awake    Nausea/Vomiting: no nausea/vomiting    Complications: none    Transfer of care protocol was followed      Last vitals:   Visit Vitals  BP (!) 168/70 (BP Location: Left arm, Patient Position: Lying)   Pulse 82   Temp 37 °C (98.6 °F) (Temporal)   Resp 17   Ht 5' 5" (1.651 m)   Wt 57.6 kg (127 lb)   SpO2 100%   Breastfeeding? No   BMI 21.13 kg/m²     "

## 2018-03-15 NOTE — DISCHARGE INSTRUCTIONS
Discharge instructions for having a Bone Marrow Aspiration / Biopsy:    Keep Bandage in place for 24 hours.  - Do not shower or take a tube bath during this time. (you may sponge bathe).  - Call the nurse or physician for excessive bleeding or pain at biopsy site.  - You may take Tylenol as needed for pain.    You have received medication to sedate you.  - Do not drive a car or operate heavy machinery for the rest of the day.  - You may resume other activities as tolerated.    You can call [phone] for any problems during the hours of 8:00 AM-5:00 PM.    For an emergency after 5:00 PM you can call [phone] and have the  page the Hematologist / Oncologist on call.

## 2018-03-15 NOTE — INTERVAL H&P NOTE
The patient has been examined and the H&P has been reviewed: OK to proceed with BMBX.    I concur with the findings and no changes have occurred since H&P was written.    Anesthesia/Surgery risks, benefits and alternative options discussed and understood by patient/family.          Active Hospital Problems    Diagnosis  POA    MDS (myelodysplastic syndrome) [D46.9]  Yes      Resolved Hospital Problems    Diagnosis Date Resolved POA   No resolved problems to display.

## 2018-03-15 NOTE — ANESTHESIA POSTPROCEDURE EVALUATION
"Anesthesia Post Evaluation    Patient: Shara Rose    Procedure(s) Performed: Procedure(s) (LRB):  BIOPSY-BONE MARROW (Left)    Final Anesthesia Type: general  Patient location during evaluation: PACU  Patient participation: Yes- Able to Participate  Level of consciousness: awake and alert  Post-procedure vital signs: reviewed and stable  Pain management: adequate  Airway patency: patent  PONV status at discharge: No PONV  Anesthetic complications: no      Cardiovascular status: blood pressure returned to baseline  Respiratory status: unassisted  Hydration status: euvolemic  Follow-up not needed.        Visit Vitals  BP (!) 161/74 (BP Location: Left arm, Patient Position: Lying)   Pulse 75   Temp 36.5 °C (97.7 °F) (Temporal)   Resp 16   Ht 5' 5" (1.651 m)   Wt 57.6 kg (127 lb)   SpO2 100%   Breastfeeding? No   BMI 21.13 kg/m²       Pain/Elliott Score: Pain Assessment Performed: Yes (3/15/2018  8:40 AM)  Presence of Pain: denies (3/15/2018  8:40 AM)  Elliott Score: 10 (3/15/2018  8:47 AM)      "

## 2018-03-15 NOTE — PLAN OF CARE
Problem: Patient Care Overview  Goal: Plan of Care Review  Patient tolerating oral liquids without difficulty. No apparent s&s of distress noted at this time, no complaints voiced at this time. Discharge instructions reviewed with patient and family with good verbal feedback received. Patient ready for discharge.

## 2018-03-15 NOTE — H&P (VIEW-ONLY)
SECTION OF HEMATOLOGY AND BONE MARROW TRANSPLANT  Return Patient Visit   02/22/2018    CHIEF COMPLAINT:   Chief Complaint   Patient presents with    Myelodysplastic syndrome    stiffness     pt c/o stiffness in back       HISTORY OF PRESENT ILLNESS:   Shara Rose is a 82 y.o. female who is referred to me July 2017 for a Hematology consultation for further evaluation of anemia. Patient's past medical history includes Sickle Cell Trait, Hypothyroidism, Chronic Kidney Disease.  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  Decision made to proceed with palliative vidaza.  She is now s/p 4 cycles.  We obtained post cycle 4 bone marrow biopsy that showed minimal response and clonal evolution.  Given lack of other therapeutic options decision made to proceed with 2 additional cycles at reduced dose/schedule.d   She remains  dependent on tranfusions of plts and blood since initiation of therapy.     Denies fever, chills, nightsweats, bleeding, brusing, lymphadenopathy, signs/symptoms of splenomegaly.  Comes to clinic with friend prior to cycle #6.         PAST MEDICAL HISTORY:   Past Medical History:   Diagnosis Date    Allergy     Anemia     Arthritis     Cataract     CKD (chronic kidney disease)     Gout, unspecified     Hypertension     Hypothyroidism     MDS (myelodysplastic syndrome) 8/14/2017    Menopause     PNA (pneumonia) 10/15/2017    Sickle cell trait     Trigger finger        PAST SURGICAL HISTORY:   Past Surgical History:   Procedure Laterality Date    APPENDECTOMY      HEMORRHOID SURGERY      HYSTERECTOMY         PAST SOCIAL HISTORY:   reports that she quit smoking about 45 years ago. She has a 0.25 pack-year smoking history. She has never used smokeless tobacco. She reports that she does not drink alcohol or use drugs.    FAMILY HISTORY:  Family History   Problem Relation Age of Onset    Hypertension Mother      Peripheral vascular disease Father     Heart disease Father     Cataracts Father     Cancer Son     Diabetes Neg Hx     Amblyopia Neg Hx     Blindness Neg Hx     Glaucoma Neg Hx     Macular degeneration Neg Hx     Retinal detachment Neg Hx     Strabismus Neg Hx        CURRENT MEDICATIONS:   Current Outpatient Prescriptions   Medication Sig    acyclovir (ZOVIRAX) 200 MG capsule Take 2 capsules (400 mg total) by mouth 2 (two) times daily.    allopurinol (ZYLOPRIM) 100 MG tablet Take 1 tablet (100 mg total) by mouth once daily. For gout    AZACITIDINE (VIDAZA INJ) Inject as directed.    ciprofloxacin HCl (CIPRO) 500 MG tablet Take 1 tablet (500 mg total) by mouth 2 (two) times daily.    ciprofloxacin HCl (CIPRO) 500 MG tablet Take 1 tablet (500 mg total) by mouth 2 (two) times daily.    clotrimazole-betamethasone 1-0.05% (LOTRISONE) cream Apply topically 2 (two) times daily.    fluconazole (DIFLUCAN) 200 MG Tab Take 1 tablet (200 mg total) by mouth once daily.    levothyroxine (SYNTHROID) 25 MCG tablet TAKE 1 TABLET EVERY DAY    meclizine (ANTIVERT) 25 mg tablet TK 1 T PO BID FOR 10 DAYS PRF DIZZINESS    ondansetron (ZOFRAN) 8 MG tablet Take 1 tablet (8 mg total) by mouth every 12 (twelve) hours as needed for Nausea.    pantoprazole (PROTONIX) 40 MG tablet Take 1 tablet (40 mg total) by mouth once daily. While on prednisone    valsartan (DIOVAN) 160 MG tablet Take 1 tablet (160 mg total) by mouth once daily.    verapamil (CALAN-SR) 240 MG CR tablet TAKE 1 TABLET EVERY DAY    VITAMIN B-1 100 MG tablet     colchicine 0.6 mg tablet Take 1 tablet (0.6 mg total) by mouth once daily.     Current Facility-Administered Medications   Medication    sodium chloride 0.9% flush 10 mL     ALLERGIES:   Review of patient's allergies indicates:   Allergen Reactions    Sulfa (sulfonamide antibiotics) Itching and Rash           REVIEW OF SYSTEMS:   Review of Systems - General ROS: negative  Psychological ROS:  "negative  Ophthalmic ROS: negative  Allergy and Immunology ROS: negative  Breast ROS: negative  Respiratory ROS: negative  Cardiovascular ROS: negative  Gastrointestinal ROS: negative  Genito-Urinary ROS: negative  Musculoskeletal ROS: negative  Neurological ROS: negative  Dermatological ROS: negative    PHYSICAL EXAM:   Vitals:    02/20/18 1049   BP: (!) 189/79   Pulse: 61   Resp: 18   Temp: 98 °F (36.7 °C)   TempSrc: Oral   SpO2: 100%   Weight: 57.6 kg (126 lb 15.8 oz)   Height: 5' 5" (1.651 m)   PainSc: 0-No pain     General - well developed, well nourished, no apparent distress  HEENT - oropharynx clear  Chest and Lung - clear to auscultation bilaterally   Cardiovascular - RRR with no MGR, normal S1 and S2  Abdomen-  soft, nontender, no palpable hepatomegaly or splenomegaly  Lymph - no palpable lymphadenopathy  Heme - no bruising, petechiae, pallor  Skin - no rashes or lesions  Psych - appropriate mood and affect      ECOG Performance Status: (foot note - ECOG PS provided by [employer]cology Group) 1 - Symptomatic but completely ambulatory    Karnofsky Performance Score:  80%- Normal Activity with Effort: Some Symptoms of Disease  DATA:   Lab Results   Component Value Date    WBC 0.74 (LL) 02/20/2018    HGB 8.5 (L) 02/20/2018    HCT 25.4 (L) 02/20/2018    MCV 87 02/20/2018    PLT 15 (LL) 02/20/2018     Gran # (ANC)   Date Value Ref Range Status   02/20/2018 0.1 (L) 1.8 - 7.7 K/uL Final     Gran%   Date Value Ref Range Status   02/20/2018 9.4 (L) 38.0 - 73.0 % Final     Lymph #   Date Value Ref Range Status   02/20/2018 0.5 (L) 1.0 - 4.8 K/uL Final     Lymph%   Date Value Ref Range Status   02/20/2018 68.9 (H) 18.0 - 48.0 % Final     CMP  Sodium   Date Value Ref Range Status   02/20/2018 143 136 - 145 mmol/L Final     Potassium   Date Value Ref Range Status   02/20/2018 3.7 3.5 - 5.1 mmol/L Final     Chloride   Date Value Ref Range Status   02/20/2018 108 95 - 110 mmol/L Final     CO2   Date Value Ref " Range Status   02/20/2018 28 23 - 29 mmol/L Final     Glucose   Date Value Ref Range Status   02/20/2018 93 70 - 110 mg/dL Final     BUN, Bld   Date Value Ref Range Status   02/20/2018 18 8 - 23 mg/dL Final     Creatinine   Date Value Ref Range Status   02/20/2018 1.1 0.5 - 1.4 mg/dL Final     Calcium   Date Value Ref Range Status   02/20/2018 9.8 8.7 - 10.5 mg/dL Final     Total Protein   Date Value Ref Range Status   02/20/2018 8.2 6.0 - 8.4 g/dL Final     Albumin   Date Value Ref Range Status   02/20/2018 3.0 (L) 3.5 - 5.2 g/dL Final     Total Bilirubin   Date Value Ref Range Status   02/20/2018 0.6 0.1 - 1.0 mg/dL Final     Comment:     For infants and newborns, interpretation of results should be based  on gestational age, weight and in agreement with clinical  observations.  Premature Infant recommended reference ranges:  Up to 24 hours.............<8.0 mg/dL  Up to 48 hours............<12.0 mg/dL  3-5 days..................<15.0 mg/dL  6-29 days.................<15.0 mg/dL       Alkaline Phosphatase   Date Value Ref Range Status   02/20/2018 152 (H) 55 - 135 U/L Final     AST   Date Value Ref Range Status   02/20/2018 21 10 - 40 U/L Final     ALT   Date Value Ref Range Status   02/20/2018 11 10 - 44 U/L Final     Anion Gap   Date Value Ref Range Status   02/20/2018 7 (L) 8 - 16 mmol/L Final     eGFR if    Date Value Ref Range Status   02/20/2018 54.4 (A) >60 mL/min/1.73 m^2 Final     eGFR if non    Date Value Ref Range Status   02/20/2018 47.2 (A) >60 mL/min/1.73 m^2 Final     Comment:     Calculation used to obtain the estimated glomerular filtration  rate (eGFR) is the CKD-EPI equation.        LD   Date Value Ref Range Status   08/03/2017 184 110 - 260 U/L Final     Comment:     Results are increased in hemolyzed samples.     Lab Results   Component Value Date    IRON 122 08/03/2017    TIBC 209 (L) 08/03/2017    FERRITIN 576 (H) 08/03/2017     Lab Results   Component Value  Date    [id number] 362 08/03/2017     Lab Results   Component Value Date    FOLATE 35.2 (H) 08/03/2017   Pathologist Interpretation TAHIR   Pathologist Interpretation TAHIR   Collected: 08/03/17 1522   Resulting lab: [facility]   Value: REVIEWED   Comment: Electronically reviewed and signed by:   Rhianna Mao MD   Signed on 08/04/17 at 13:32   There is a very faint IgA kappa monoclonal band in beta. - SPEP negative     HEMOGLOBIN ELECTROPHORESIS,HGB A2 UGO.   Order: [id number]   Status:  Final result   Visible to patient:  No (Not Released) Next appt:  None Dx:  Anemia     Ref Range & Units 3yr ago 6yr ago    Hgb A2 Quant 1.5 - 3.8 % 2.4 2.2CM    Hemoglobin Bands   Hb A , Hb S and Hb A2 textCM    Hemoglobin Electrophoresis Interp   See comment    Comments: Hemoglobins A and S are present, measuring approximately 62% and 37%   respectively.  This pattern is compatible with the patient's history   of sickle cell trait, provided the patient has no recent history of   red cell transfusion.  Correlate clinically.   Interpreted by Rhianna Mao M.D.                  Bone Marrow biopsy - 8/7/17  SPECIMEN  1) Bone marrow clot, left iliac crest.  2) Bone marrow core biopsy, left iliac crest.  3) Bone Marrow Aspirate (Slides)  FINAL PATHOLOGIC DIAGNOSIS  LEFT ILIAC CREST BONE MARROW ASPIRATE SMEAR, CLOT SECTION, AND CORE BIOPSY:  -- HYPERCELLULAR MARROW FOR AGE (70%) WITH ERYTHROID HYPERPLASIA, RING SIDEROBLASTS  AND ATYPICAL MEGAKARYOCYTIC PROLIFERATION, HIGHLY SUGGESTIVE OF A MYELODYSPLASTIC  SYNDROME.  -- INCREASED IRON STORAGE.  -- SEE COMMENT.  COMMENT:  CBC data (8/3/2017): WBC 2.31 (granulocyte 22.9%, lymphocyte 55%, monocyte 5.2%, eosinophil 16.9%), RBC  2.66, HGB 8.5, HCT 24.7, MCV 93, MCHC 34.4, PLT 24.  Peripheral blood smear is not submitted for review.  Flow cytometric analysis of bone marrow shows populations of polyclonal B lymphocytes and T lymphocytes that  are  immunophenotypically unremarkable. The blast gate is not increased.  Immunohistochemical stains are performed on the biopsy core for greater sensitivity and further architectural  assessment with adequate controls. CD34 highlights rare immature mononuclear cells. The early erythroid  precursors are positive for E-cadherin. CD61 highlights markedly increased megakaryocytes with frequent small  forms. The small lymphoid aggregates are composed of a mixture of CD3- positive T-cells and CD20-positive  B-cells.  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  Correlation with cytogenetics is critical. Close clinical follow-up is required.  Diagnosed by: Gio Prado  (Electronically Signed: 2017-08-10 15:02:16)  Microscopic Examination  BONE MARROW ASPIRATE DIFFERENTIAL:  Blasts----------------------------------------------1%  Promyelocytes---------------------------------0%  Myelocytes--------------------------------------3%  Metamyelocytes------------------------------ 5%  Bands----------------------------------------------4%  PMN Neutrophils------------------------------6%  Eosinophils------------------------------------------2%  Basophils---------------------------------------0%  Monocytes------------------------------------1%  Lymphocytes-------------------------------------31%  Plasma cells------------------------------------------1%  Erythroid precursors--------------------------47%  BONE MARROW ASPIRATE SMEAR:  The smears contain cellular spicules. The myeloid to erythroid ratio is markedly decreased, approximately 0.5:1.  Some erythroid precursors display megaloblastoid change and a shift towards immaturity. Blasts are not increased.  Megakaryocytes are increased and display frequent atypical nuclear features including hypolobation and small  "forms  as well as occasional forms with  nuclei. Stainable iron is increased. Ring sideroblasts are seen,  approximately 30-35% of the erythroid precursors.  BONE MARROW CLOT SECTION:  The clot sections contain small spicules with cellular composition that is similar to the biopsy core. Stainable iron is  present.  BONE MARROW CORE BIOPSY:  Cortical and medullary bones are present. The cellularity is approximately 70%. The myeloid to erythroid ratio is  markedly decreased. Megakaryocytes are increased and form small clusters in areas. Small lymphoid aggregates  composed of small mature lymphocytes are seen. Stainable iron is present.    Supplemental Diagnosis -1/11/2018  Please see fluorescent in-situ hybridization (FISH) results reported in [facility] from [facility]  ([facility] [place] [address]):  "MDS FISH: Abnormal.  Interpretation:  Comments: The result is abnormal and indicates approximately 23% of nuclei had a 7q deletion and trisomy 8. 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 Since  trisomy 8 was not previously identified, this result may represent cytogenetic evolution; however, the significance of  the trisomy 8 is unclear. Clinical and hematopathologic correlation is recommended."  Please see cytogenetic karyotype results reported in [facility] from [facility] ([facility] [place] [address]):  " 51,XX,+1,brenda(1;7)(q10;p10),+8,+14,+15,+18,+19[8].  The result is abnormal. Each of 8 available metaphases had an unbalanced 1;7 translocation resulting in a 1q  duplication and a 7q deletion and trisomy 8, 14, 15, 18 and 19 was also observed. 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 This result indicates persistence of this patient's myeloid clone. Since  these additional abnormalities were not previously " "observed, this result likely indicates clonal evolution. "  (Electronically Signed: 2018-01-22 13:57:43 )  Diagnosed by: Gio Prado  FINAL PATHOLOGIC DIAGNOSIS  BONE MARROW, LEFT ILIAC CREST (ASPIRATE SMEAR, TOUCH PREPARATION, CLOT SECTION, AND  CORE BIOPSY):  -- NORMOCELLULAR MARROW (20%) WITH MILD TRILINEAGE HYPOPLASIA AND LYMPHOCYTOSIS (SEE  COMMENT).  -- ADEQUATE IRON STORAGE.  COMMENT:  CBC data (1/9/2018): WBC 1.10 K/?L (granulocyte 38.1 %, lymphocyte 61.9 %, monocyte 0.0 %, eosinophil 0.0 %,  basophil 0.0 %), RBC 2.44 M/?L, HGB 7.0 g/dL, HCT 21.1 %, MCV 87 fL, MCH 28.7 pg, MCHC 33.2 g/dL, Platelet  3 K/?L.  Peripheral blood smear is not provided for review.  Flow cytometric analysis of bone marrow shows populations of polyclonal B lymphocytes. T lymphocytes show  reversed CD4:CD8 ratio. The blast gate is not increased.  Immunohistochemical stains are performed on the biopsy core for greater sensitivity and further architectural  assessment with adequate controls. CD34 stains rare blasts. The cellularity is composed of predominantly  CD3-positive T lymphocytes and fewer CD79a/PAX5-positive B lymphocytes. Myeloperoxidase and spectrin stain  decreased myeloid and erythroid precursors, respectively. TdT is negative.  Overall, the aspirate smear and the touch preparation is suboptimal for evaluation of dysplasia and ring  sideroblasts. Although the marrow is normocellular for age, mild trilineage hypoplasia is noted. Correlation with  other laboratory results including cytogenetics is recommended.  Diagnosed by: Gio Prado  (Electronically Signed: 2018-01-16 11:49:43)  ASSESSMENT AND PLAN:   Encounter Diagnoses   Name Primary?   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 ; Int 2 IPSS; Very high risk R-IPSS  -not a stem cell transplant candidate " due to age/comorbidities  -discussed terminal nature of diagnosis and recommendations for hypomethylating agent therapy  -discussed risks of disease associated with bone marrow failure and transformation to AML  -can consider addition of GASTON if no response in anemia to vidaza   -pancytopenia due to MDS and vidaza ; suspect anemia augmented buy underlying sickle trait ;  transfuse for plt >10, hgb >7; plan for 1 unit prbc and 1 unit plt today; given transfusion requirements plan for twice weekly labs and prn transfusions   -she has  completed 5 cycles of vidaza  ;   repeat bone marrow biopsy following cycle #4 showed stable morphologic findings but there has been cytogenetic clonal evolution   -discussed with patient that given limited therapeutic options following HMA failure, and that some possible delayed HMA response that I would recommend proceeding with another 2 cycles of vidaza and perform repeat bone marrow biopsy after cycle #6  -will decrease dose /schedule to 50mg/m2  day 1-5 of 28 day cycle to mitigate cytopenias and transfusion requirements   - erythorpoetin level >1000 so defer procrit   -reviewed neutropenic and bleeding precautions with patient  -prn zofran for home use for CINV   MGUS  -for IgA MGUS; anemia likely due to MDS; no hypercalcemia, Cr at baseline; will discuss skeletal survey at next appt but lack of clonal plasma cells in bone marrow make underlying myeloma highly unlikely; repeat myeloma studies  At next visit     2)ID  -completed course of levaquin for CAP on 10/25/17  -transition back to cipro ppx  -contineu acyc, fluc ppx     3)gout  -continue colchicine; improving     -continue all other chronic medications per pcp     Follow Up:  -q tues/friday cbc, cmp, type and screen (10am-11am as only time she can get rides) for next 4   weeks   -bone marrow biopsy in approximately 2-3 weeks  -MD appt after labs in 4 weeks    Ankit Wang MD  Hematology/Oncology/Bone Marrow Transplant

## 2018-03-15 NOTE — PROCEDURES
PROCEDURE NOTE:  Bone Marrow Biopsy  Date: 3/15/18  Indication: MDS  Consent: Informed consent was obtained from patient.  Timeout: Done and documented.  Site: Left posterior illiac crest.  Position: R lateral   Prep: Betadine.  Needle used: 11 gauge Jamshidi needle.  Anesthetic: 1% lidocaine 5 cc.  Biopsy: The biopsy needle was introduced into the marrow cavity and an aspirate was obtained without complications and sent for flow cytometry, DNA/RNA hold, and cytogenetics. Core biopsy obtained without difficulty and sent for routine histologic examination.  Complications: None.  Disposition: The patient was discharged home when appropriate per anesthesia.  Minimal blood loss    Diana Teague, KOKI, FNP  Hematology/Bone Marrow Transplant

## 2018-03-15 NOTE — PLAN OF CARE
Patient arrived from OR with DEENA Moore and ROSA M Matthew CRNA.  Patient stable.  Report received at this time.  Assumed care of patient at this time.

## 2018-03-16 ENCOUNTER — TELEPHONE (OUTPATIENT)
Dept: HEMATOLOGY/ONCOLOGY | Facility: CLINIC | Age: 83
End: 2018-03-16

## 2018-03-16 ENCOUNTER — INFUSION (OUTPATIENT)
Dept: INFUSION THERAPY | Facility: HOSPITAL | Age: 83
End: 2018-03-16
Attending: INTERNAL MEDICINE
Payer: MEDICARE

## 2018-03-16 VITALS
HEART RATE: 68 BPM | TEMPERATURE: 98 F | SYSTOLIC BLOOD PRESSURE: 166 MMHG | DIASTOLIC BLOOD PRESSURE: 86 MMHG | RESPIRATION RATE: 16 BRPM

## 2018-03-16 DIAGNOSIS — D63.0 ANEMIA IN NEOPLASTIC DISEASE: ICD-10-CM

## 2018-03-16 DIAGNOSIS — D63.0 ANEMIA IN NEOPLASTIC DISEASE: Primary | ICD-10-CM

## 2018-03-16 LAB
BLD PROD TYP BPU: NORMAL
BLOOD UNIT EXPIRATION DATE: NORMAL
BLOOD UNIT TYPE CODE: 5100
BLOOD UNIT TYPE: NORMAL
BONE MARROW IRON STAIN COMMENT: NORMAL
CODING SYSTEM: NORMAL
DISPENSE STATUS: NORMAL
NUM UNITS TRANS PACKED RBC: NORMAL

## 2018-03-16 PROCEDURE — P9040 RBC LEUKOREDUCED IRRADIATED: HCPCS

## 2018-03-16 PROCEDURE — 36430 TRANSFUSION BLD/BLD COMPNT: CPT

## 2018-03-16 PROCEDURE — 25000003 PHARM REV CODE 250: Performed by: NURSE PRACTITIONER

## 2018-03-16 PROCEDURE — 86920 COMPATIBILITY TEST SPIN: CPT

## 2018-03-16 RX ORDER — DIPHENHYDRAMINE HCL 25 MG
25 CAPSULE ORAL
Status: CANCELLED | OUTPATIENT
Start: 2018-03-16

## 2018-03-16 RX ORDER — ACETAMINOPHEN 325 MG/1
650 TABLET ORAL
Status: CANCELLED | OUTPATIENT
Start: 2018-03-16

## 2018-03-16 RX ORDER — DIPHENHYDRAMINE HCL 25 MG
25 CAPSULE ORAL
Status: COMPLETED | OUTPATIENT
Start: 2018-03-16 | End: 2018-03-16

## 2018-03-16 RX ORDER — ACETAMINOPHEN 325 MG/1
650 TABLET ORAL
Status: COMPLETED | OUTPATIENT
Start: 2018-03-16 | End: 2018-03-16

## 2018-03-16 RX ORDER — HYDROCODONE BITARTRATE AND ACETAMINOPHEN 500; 5 MG/1; MG/1
TABLET ORAL ONCE
Status: DISCONTINUED | OUTPATIENT
Start: 2018-03-16 | End: 2018-03-16 | Stop reason: HOSPADM

## 2018-03-16 RX ORDER — HYDROCODONE BITARTRATE AND ACETAMINOPHEN 500; 5 MG/1; MG/1
TABLET ORAL ONCE
Status: CANCELLED | OUTPATIENT
Start: 2018-03-16 | End: 2018-03-16

## 2018-03-16 RX ADMIN — DIPHENHYDRAMINE HYDROCHLORIDE 25 MG: 25 CAPSULE ORAL at 12:03

## 2018-03-16 RX ADMIN — ACETAMINOPHEN 650 MG: 325 TABLET, FILM COATED ORAL at 12:03

## 2018-03-16 NOTE — PLAN OF CARE
Problem: Patient Care Overview  Goal: Plan of Care Review  Outcome: Ongoing (interventions implemented as appropriate)  Pt. Tolerated transfusion. VSS. PIV flushed, and removed. Reviewed side effects of a delayed transfusion reaction. No questions at this time.

## 2018-03-19 ENCOUNTER — TELEPHONE (OUTPATIENT)
Dept: HEMATOLOGY/ONCOLOGY | Facility: CLINIC | Age: 83
End: 2018-03-19

## 2018-03-19 NOTE — TELEPHONE ENCOUNTER
Pt received 1 unit of blood on 03/16/18  Hina  ----- Message from Juan Wang MD sent at 3/19/2018  5:54 AM CDT -----   Was she able to get blood for this?  ----- Message -----  From: Esteban Zhong MD  Sent: 3/16/2018  10:46 AM  To: Juan Wang MD        ----- Message -----  From: Renard, Soft Lab Interface  Sent: 3/16/2018  10:43 AM  To: Esteban Zhong MD

## 2018-03-19 NOTE — TELEPHONE ENCOUNTER
----- Message from Juan Wang MD sent at 3/16/2018 11:51 AM CDT -----  Mrs cheng will need 1 unit prbc  ----- Message -----  From: Renard, Soft Lab Interface  Sent: 3/16/2018  10:43 AM  To: Juan Wang MD    Pt received 1 unit of blood on 03/16/18  [facility]

## 2018-03-20 ENCOUNTER — TELEPHONE (OUTPATIENT)
Dept: HEMATOLOGY/ONCOLOGY | Facility: CLINIC | Age: 83
End: 2018-03-20

## 2018-03-20 ENCOUNTER — INFUSION (OUTPATIENT)
Dept: INFUSION THERAPY | Facility: HOSPITAL | Age: 83
End: 2018-03-20
Attending: INTERNAL MEDICINE
Payer: MEDICARE

## 2018-03-20 ENCOUNTER — OFFICE VISIT (OUTPATIENT)
Dept: HEMATOLOGY/ONCOLOGY | Facility: CLINIC | Age: 83
End: 2018-03-20
Payer: MEDICARE

## 2018-03-20 VITALS
RESPIRATION RATE: 18 BRPM | SYSTOLIC BLOOD PRESSURE: 173 MMHG | TEMPERATURE: 98 F | DIASTOLIC BLOOD PRESSURE: 77 MMHG | HEART RATE: 72 BPM

## 2018-03-20 VITALS
HEART RATE: 73 BPM | DIASTOLIC BLOOD PRESSURE: 97 MMHG | SYSTOLIC BLOOD PRESSURE: 177 MMHG | RESPIRATION RATE: 18 BRPM | HEIGHT: 65 IN | TEMPERATURE: 98 F | WEIGHT: 121.5 LBS | OXYGEN SATURATION: 99 % | BODY MASS INDEX: 20.24 KG/M2

## 2018-03-20 DIAGNOSIS — D46.Z MDS (MYELODYSPLASTIC SYNDROME), HIGH GRADE: ICD-10-CM

## 2018-03-20 DIAGNOSIS — Z71.89 GOALS OF CARE, COUNSELING/DISCUSSION: ICD-10-CM

## 2018-03-20 DIAGNOSIS — D46.9 MDS (MYELODYSPLASTIC SYNDROME): Primary | ICD-10-CM

## 2018-03-20 DIAGNOSIS — D61.818 PANCYTOPENIA: ICD-10-CM

## 2018-03-20 DIAGNOSIS — D46.C MDS (MYELODYSPLASTIC SYNDROME) WITH 5Q DELETION: Primary | ICD-10-CM

## 2018-03-20 DIAGNOSIS — D70.9 NEUTROPENIA, UNSPECIFIED TYPE: Primary | ICD-10-CM

## 2018-03-20 LAB
BLD PROD TYP BPU: NORMAL
BLOOD UNIT EXPIRATION DATE: NORMAL
BLOOD UNIT TYPE CODE: 6200
BLOOD UNIT TYPE: NORMAL
BODY SITE - BONE MARROW: NORMAL
CLINICAL DIAGNOSIS - BONE MARROW: NORMAL
CODING SYSTEM: NORMAL
DISPENSE STATUS: NORMAL
FLOW CYTOMETRY ANTIBODIES ANALYZED - BONE MARROW: NORMAL
FLOW CYTOMETRY COMMENT - BONE MARROW: NORMAL
FLOW CYTOMETRY INTERPRETATION - BONE MARROW: NORMAL
NUM UNITS TRANS WBC-POOR PLATPHERESIS: NORMAL

## 2018-03-20 PROCEDURE — 86644 CMV ANTIBODY: CPT

## 2018-03-20 PROCEDURE — 99215 OFFICE O/P EST HI 40 MIN: CPT | Mod: S$GLB,,, | Performed by: INTERNAL MEDICINE

## 2018-03-20 PROCEDURE — 99999 PR PBB SHADOW E&M-EST. PATIENT-LVL III: CPT | Mod: PBBFAC,,, | Performed by: INTERNAL MEDICINE

## 2018-03-20 PROCEDURE — P9037 PLATE PHERES LEUKOREDU IRRAD: HCPCS

## 2018-03-20 PROCEDURE — 36430 TRANSFUSION BLD/BLD COMPNT: CPT

## 2018-03-20 PROCEDURE — 3077F SYST BP >= 140 MM HG: CPT | Mod: CPTII,S$GLB,, | Performed by: INTERNAL MEDICINE

## 2018-03-20 PROCEDURE — 3078F DIAST BP <80 MM HG: CPT | Mod: CPTII,S$GLB,, | Performed by: INTERNAL MEDICINE

## 2018-03-20 PROCEDURE — 63600175 PHARM REV CODE 636 W HCPCS: Performed by: INTERNAL MEDICINE

## 2018-03-20 PROCEDURE — 25000003 PHARM REV CODE 250: Performed by: INTERNAL MEDICINE

## 2018-03-20 RX ORDER — ACETAMINOPHEN 325 MG/1
650 TABLET ORAL
Status: CANCELLED | OUTPATIENT
Start: 2018-03-20

## 2018-03-20 RX ORDER — ACETAMINOPHEN 325 MG/1
650 TABLET ORAL
Status: COMPLETED | OUTPATIENT
Start: 2018-03-20 | End: 2018-03-20

## 2018-03-20 RX ORDER — FLUCONAZOLE 200 MG/1
400 TABLET ORAL DAILY
Qty: 30 TABLET | Refills: 0 | Status: SHIPPED | OUTPATIENT
Start: 2018-03-20 | End: 2018-04-02 | Stop reason: SDUPTHER

## 2018-03-20 RX ORDER — DIPHENHYDRAMINE HYDROCHLORIDE 50 MG/ML
25 INJECTION INTRAMUSCULAR; INTRAVENOUS
Status: COMPLETED | OUTPATIENT
Start: 2018-03-20 | End: 2018-03-20

## 2018-03-20 RX ORDER — POTASSIUM CHLORIDE 20 MEQ/1
40 TABLET, EXTENDED RELEASE ORAL ONCE
Status: COMPLETED | OUTPATIENT
Start: 2018-03-20 | End: 2018-03-20

## 2018-03-20 RX ORDER — HYDROCODONE BITARTRATE AND ACETAMINOPHEN 500; 5 MG/1; MG/1
TABLET ORAL ONCE
Status: CANCELLED | OUTPATIENT
Start: 2018-03-20 | End: 2018-03-20

## 2018-03-20 RX ORDER — GABAPENTIN 100 MG/1
CAPSULE ORAL
COMMUNITY
Start: 2018-03-10

## 2018-03-20 RX ORDER — DIPHENHYDRAMINE HYDROCHLORIDE 50 MG/ML
25 INJECTION INTRAMUSCULAR; INTRAVENOUS
Status: CANCELLED | OUTPATIENT
Start: 2018-03-20

## 2018-03-20 RX ORDER — HYDROCODONE BITARTRATE AND ACETAMINOPHEN 500; 5 MG/1; MG/1
TABLET ORAL ONCE
Status: COMPLETED | OUTPATIENT
Start: 2018-03-20 | End: 2018-03-20

## 2018-03-20 RX ADMIN — POTASSIUM CHLORIDE 40 MEQ: 1500 TABLET, EXTENDED RELEASE ORAL at 04:03

## 2018-03-20 RX ADMIN — ACETAMINOPHEN 650 MG: 325 TABLET, FILM COATED ORAL at 03:03

## 2018-03-20 RX ADMIN — DIPHENHYDRAMINE HYDROCHLORIDE 25 MG: 50 INJECTION INTRAMUSCULAR; INTRAVENOUS at 03:03

## 2018-03-20 RX ADMIN — SODIUM CHLORIDE: 9 INJECTION, SOLUTION INTRAVENOUS at 03:03

## 2018-03-20 NOTE — PROGRESS NOTES
SECTION OF HEMATOLOGY AND BONE MARROW TRANSPLANT  Return Patient Visit   03/22/2018    CHIEF COMPLAINT:   Chief Complaint   Patient presents with    MDS (myelodysplastic syndrome), high grade    Results       HISTORY OF PRESENT ILLNESS:   Shara Rose is a 82 y.o. female who is referred to me July 2017 for a Hematology consultation for further evaluation of anemia. Patient's past medical history includes Sickle Cell Trait, Hypothyroidism, Chronic Kidney Disease.  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  Decision made to proceed with palliative vidaza.  She is now s/p 4 cycles.  We obtained post cycle 4 bone marrow biopsy that showed minimal response and clonal evolution.  Given lack of other therapeutic options decision made to proceed with 2 additional cycles at reduced dose/schedule.d   She remains  dependent on tranfusions of plts and blood since initiation of therapy.     Denies fever, chills, nightsweats, bleeding, brusing, lymphadenopathy, signs/symptoms of splenomegaly.  She completed 6 cycles of vidaza, last 2/20/18; she had post cycle 6 bone marrow biopsy and presents to review results with friend.       PAST MEDICAL HISTORY:   Past Medical History:   Diagnosis Date    Allergy     Anemia     Arthritis     Cataract     CKD (chronic kidney disease)     Gout, unspecified     Hypertension     Hypothyroidism     MDS (myelodysplastic syndrome) 8/14/2017    Menopause     PNA (pneumonia) 10/15/2017    Sickle cell trait     Trigger finger        PAST SURGICAL HISTORY:   Past Surgical History:   Procedure Laterality Date    APPENDECTOMY      HEMORRHOID SURGERY      HYSTERECTOMY         PAST SOCIAL HISTORY:   reports that she quit smoking about 45 years ago. She has a 0.25 pack-year smoking history. She has never used smokeless tobacco. She reports that she does not drink alcohol or use drugs.    FAMILY HISTORY:  Family History    Problem Relation Age of Onset    Hypertension Mother     Peripheral vascular disease Father     Heart disease Father     Cataracts Father     Cancer Son     Diabetes Neg Hx     Amblyopia Neg Hx     Blindness Neg Hx     Glaucoma Neg Hx     Macular degeneration Neg Hx     Retinal detachment Neg Hx     Strabismus Neg Hx        CURRENT MEDICATIONS:   Current Outpatient Prescriptions   Medication Sig    acyclovir (ZOVIRAX) 200 MG capsule Take 2 capsules (400 mg total) by mouth 2 (two) times daily.    allopurinol (ZYLOPRIM) 100 MG tablet Take 1 tablet (100 mg total) by mouth once daily. For gout    AZACITIDINE (VIDAZA INJ) Inject as directed.    ciprofloxacin HCl (CIPRO) 500 MG tablet Take 1 tablet (500 mg total) by mouth 2 (two) times daily.    ciprofloxacin HCl (CIPRO) 500 MG tablet Take 1 tablet (500 mg total) by mouth 2 (two) times daily.    clotrimazole-betamethasone 1-0.05% (LOTRISONE) cream Apply topically 2 (two) times daily.    gabapentin (NEURONTIN) 100 MG capsule     levothyroxine (SYNTHROID) 25 MCG tablet TAKE 1 TABLET EVERY DAY    meclizine (ANTIVERT) 25 mg tablet TK 1 T PO BID FOR 10 DAYS PRF DIZZINESS    ondansetron (ZOFRAN) 8 MG tablet Take 1 tablet (8 mg total) by mouth every 12 (twelve) hours as needed for Nausea.    pantoprazole (PROTONIX) 40 MG tablet Take 1 tablet (40 mg total) by mouth once daily. While on prednisone    valsartan (DIOVAN) 160 MG tablet Take 1 tablet (160 mg total) by mouth once daily.    verapamil (CALAN-SR) 240 MG CR tablet TAKE 1 TABLET EVERY DAY    VITAMIN B-1 100 MG tablet     colchicine 0.6 mg tablet Take 1 tablet (0.6 mg total) by mouth once daily.    fluconazole (DIFLUCAN) 200 MG Tab Take 2 tablets (400 mg total) by mouth once daily.     Current Facility-Administered Medications   Medication    sodium chloride 0.9% flush 10 mL     ALLERGIES:   Review of patient's allergies indicates:   Allergen Reactions    Sulfa (sulfonamide antibiotics)  "Itching and Rash           REVIEW OF SYSTEMS:   Review of Systems - General ROS: negative  Psychological ROS: negative  Ophthalmic ROS: negative  Allergy and Immunology ROS: negative  Breast ROS: negative  Respiratory ROS: negative  Cardiovascular ROS: negative  Gastrointestinal ROS: negative  Genito-Urinary ROS: negative  Musculoskeletal ROS: negative  Neurological ROS: negative  Dermatological ROS: negative    PHYSICAL EXAM:   Vitals:    03/20/18 1344   BP: (!) 177/97   Pulse: 73   Resp: 18   Temp: 97.8 °F (36.6 °C)   TempSrc: Oral   SpO2: 99%   Weight: 55.1 kg (121 lb 7.6 oz)   Height: 5' 5" (1.651 m)   PainSc: 0-No pain     General - well developed, well nourished, no apparent distress  HEENT - oropharynx clear  Chest and Lung - clear to auscultation bilaterally   Cardiovascular - RRR with no MGR, normal S1 and S2  Abdomen-  soft, nontender, no palpable hepatomegaly or splenomegaly  Lymph - no palpable lymphadenopathy  Heme - no bruising, petechiae, pallor  Skin - no rashes or lesions  Psych - appropriate mood and affect      ECOG Performance Status: (foot note - ECOG PS provided by [employer]cology [employer]) 1 - Symptomatic but completely ambulatory    Karnofsky Performance Score:  80%- Normal Activity with Effort: Some Symptoms of Disease  DATA:   Lab Results   Component Value Date    WBC 0.43 (LL) 03/20/2018    HGB 7.8 (L) 03/20/2018    HCT 22.9 (L) 03/20/2018    MCV 84 03/20/2018    PLT 3 (LL) 03/20/2018     Gran # (ANC)   Date Value Ref Range Status   03/20/2018 0.1 (L) 1.8 - 7.7 K/uL Final     Gran%   Date Value Ref Range Status   03/20/2018 20.9 (L) 38.0 - 73.0 % Final     Lymph #   Date Value Ref Range Status   03/20/2018 0.2 (L) 1.0 - 4.8 K/uL Final     Lymph%   Date Value Ref Range Status   03/20/2018 55.8 (H) 18.0 - 48.0 % Final     CMP  Sodium   Date Value Ref Range Status   03/20/2018 140 136 - 145 mmol/L Final     Potassium   Date Value Ref Range Status   03/20/2018 2.5 (LL) 3.5 - 5.1 " mmol/L Final     Comment:     *Critical value -  Results called to and read back by:SISI HANSON RN       Chloride   Date Value Ref Range Status   03/20/2018 99 95 - 110 mmol/L Final     CO2   Date Value Ref Range Status   03/20/2018 31 (H) 23 - 29 mmol/L Final     Glucose   Date Value Ref Range Status   03/20/2018 115 (H) 70 - 110 mg/dL Final     BUN, Bld   Date Value Ref Range Status   03/20/2018 13 8 - 23 mg/dL Final     Creatinine   Date Value Ref Range Status   03/20/2018 1.2 0.5 - 1.4 mg/dL Final     Calcium   Date Value Ref Range Status   03/20/2018 9.4 8.7 - 10.5 mg/dL Final     Total Protein   Date Value Ref Range Status   03/20/2018 7.7 6.0 - 8.4 g/dL Final     Albumin   Date Value Ref Range Status   03/20/2018 2.3 (L) 3.5 - 5.2 g/dL Final     Total Bilirubin   Date Value Ref Range Status   03/20/2018 0.8 0.1 - 1.0 mg/dL Final     Comment:     For infants and newborns, interpretation of results should be based  on gestational age, weight and in agreement with clinical  observations.  Premature Infant recommended reference ranges:  Up to 24 hours.............<8.0 mg/dL  Up to 48 hours............<12.0 mg/dL  3-5 days..................<15.0 mg/dL  6-29 days.................<15.0 mg/dL       Alkaline Phosphatase   Date Value Ref Range Status   03/20/2018 141 (H) 55 - 135 U/L Final     AST   Date Value Ref Range Status   03/20/2018 15 10 - 40 U/L Final     ALT   Date Value Ref Range Status   03/20/2018 7 (L) 10 - 44 U/L Final     Anion Gap   Date Value Ref Range Status   03/20/2018 10 8 - 16 mmol/L Final     eGFR if    Date Value Ref Range Status   03/20/2018 49.0 (A) >60 mL/min/1.73 m^2 Final     eGFR if non    Date Value Ref Range Status   03/20/2018 42.5 (A) >60 mL/min/1.73 m^2 Final     Comment:     Calculation used to obtain the estimated glomerular filtration  rate (eGFR) is the CKD-EPI equation.        LD   Date Value Ref Range Status   08/03/2017 184 110 - 260 U/L  Final     Comment:     Results are increased in hemolyzed samples.     Lab Results   Component Value Date    IRON 122 08/03/2017    TIBC 209 (L) 08/03/2017    FERRITIN 576 (H) 08/03/2017     Lab Results   Component Value Date    NFJDQMFQ12 362 08/03/2017     Lab Results   Component Value Date    FOLATE 35.2 (H) 08/03/2017   Pathologist Interpretation TAHIR   Pathologist Interpretation TAHIR   Collected: 08/03/17 1522   Resulting lab: [facility]   Value: REVIEWED   Comment: Electronically reviewed and signed by:   Rhianna Mao MD   Signed on 08/04/17 at 13:32   There is a very faint IgA kappa monoclonal band in beta. - SPEP negative     HEMOGLOBIN ELECTROPHORESIS,HGB A2 UGO.   Order: [id number]   Status:  Final result   Visible to patient:  No (Not Released) Next appt:  None Dx:  Anemia     Ref Range & Units 3yr ago 6yr ago    Hgb A2 Quant 1.5 - 3.8 % 2.4 2.2CM    Hemoglobin Bands   Hb A , Hb S and Hb A2 textCM    Hemoglobin Electrophoresis Interp   See comment    Comments: Hemoglobins A and S are present, measuring approximately 62% and 37%   respectively.  This pattern is compatible with the patient's history   of sickle cell trait, provided the patient has no recent history of   red cell transfusion.  Correlate clinically.   Interpreted by Rhianna Mao M.D.            SPECIMEN-3/15/2018  1) Bone marrow clot, left iliac crest.  2) Bone marrow core biopsy, left iliac crest.  3) Bone marrow aspirate slides.  FINAL PATHOLOGIC DIAGNOSIS  CBC DATA 3/13/2018:  RBC: 2.59 M/UL, H/H :7.1/21.5 , MCV : 83 FL, WBC: 0.47 K/UL, Gran 19.2%, Lymph 57.4 %, Mono 19.1 %,  Eosinophil 0%, Basophil 0%, Platelet: 3 K/UL.  No peripheral blood smear was submitted for evaluation.  LEFT ILIAC CREST BONE MARROW TOUCH PREP (INADEQUATE), BONE MARROW CLOT, AND BONE  MARROW CORE BIOPSY WITH:  CELLULARITY=30-40%.  CONSISTENT WITH MYELODYSPLASTIC SYNDROME WITH EXCESS BLASTS-2 AND FIBROSIS (GRADE 2).  SEE  COMMENT.  MARKEDLY DECREASED ERYTHROPOIESIS.  INCREASED PLASMA CELLS.  INCREASED STORAGE IRON.  INCREASED NUMBER OF MEGAKARYOCYTES WITH DYSPLASTIC MORPHOLOGY.  COMMENT: Flow cytometry analysis of bone marrow aspirate shows lymph gate(41%) containing mostly T cells.  No B cell clonality or T-cell aberrancy is evident. CD4 to CD8 ratio is reversed. Blast gate not increased (15.7%)  with cells expression of CD2 (partial), CD 7 (partial), CD9, CD13, CD34(partial), CD11c (partial),  (partial).  CD33 is negative.  Immunohistochemical studies were performed on the clot and core biopsy for further architecture evaluation with  adequate positive and negative controls. Scattered mixed predominantly T cells (CD3 positive) and B cells (CD20  positive) are evident. Markedly increased plasma cells ( positive) are noted. In situ hybridization for kappa  and lambda light chain is pending. MPO shows myeloid cells. E-cadherin and spectruin highlight occasional  erythroid precursors. About 6-7% CD 34 positive immature cells are evident. CD61 shows increased small  dysplastic megakaryocytes.  Findings are consistent with myelodysplastic syndrome with excess blasts 2 and fibrosis. Patient is evolving into  acute myeloid leukemia. Close follow-up is highly recommended.  Diagnosed by: Catalina Mahajan M.D.  (Electronically Signed: 2018-03-20 11:00:06)    ASSESSMENT AND PLAN:   Encounter Diagnoses   Name Primary?    Neutropenia, unspecified type Yes    Pancytopenia     MDS (myelodysplastic syndrome), high grade     Goals of care, counseling/discussion      1)HEME  MDS  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 ; Int 2 IPSS; Very high risk R-IPSS with monosomal karyotype  -she has completed 6 cycles for vidaza (last  2/20/18)  -post cycle 4 and post cycle 6 bone marrow biopsies have unfortunately shown clonal evolution and persistently increasing blast burden suggestive of impedning transformation to  AML (blast 15%); discontinue vidaza   -long jose j discussion with pt and friend regarding limited therapeutic options with high risk MDS following HMA failure; not high dose chemotherapy/transplant candidate due to age/comorbidities; discussed typical survival based on R-IPSS data typically measured in months  -discussed DNR/DNI status  -discussed referral to outside center for clinical trial vs BSC (antibiotics, transfusions) locally; she wishes to go home and discuss all above issues with her children and reconvene next week   -reviewed neutropenic and bleeding precautions with patient  -prn zofran for home use for CINV  -continue transfusion support with plt goal 10; hgb 7; transfuse one unit plt today     MGUS  -for IgA MGUS; anemia likely due to MDS; no hypercalcemia, Cr at baseline;  Minimal bone marrow plasmacytosis; suspect this is clinically insignificant in context of her high risk MDS    2)ID  -completed course of levaquin for CAP on 10/25/17  -transition back to cipro ppx  -contineu acyc, fluc ppx     3)gout  -continue colchicine; improving     -continue all other chronic medications per pcp     Follow Up:  -cbc, cmp, type and screen and MD appt in 1 week; encouraged her to bring family to appt  Ankit Wang MD  Hematology/Oncology/Bone Marrow Transplant

## 2018-03-20 NOTE — NURSING
1552  Per MARK Cm RN , new order per MD for potassium chloride 40mEq oral once today, same lab result today 2.5; discussed same with pt

## 2018-03-20 NOTE — PLAN OF CARE
Problem: Patient Care Overview  Goal: Plan of Care Review  Outcome: Ongoing (interventions implemented as appropriate)  1705  transfusion completed, pt tolerated well; discussed importance of taking oral BP meds daily as prescribed and at correct time (pt did not take today); pt instructed to contact MD for any needs or concerns; printed AVS declined, pt verbalized understanding of all discussed and when to report next

## 2018-03-20 NOTE — Clinical Note
-cbc, cmp, type and screen and md appt at 1:40 on 3/26; ok to overbook -will need transfusions after, please schedule chair

## 2018-03-20 NOTE — PLAN OF CARE
Problem: Anemia (Adult)  Goal: Symptom Improvement  Patient will demonstrate the desired outcomes by discharge/transition of care.   Outcome: Ongoing (interventions implemented as appropriate)  Pt here for one unit platelets, also oral Potassium once, accompanied by daughter, no new complaints or concerns at present; discussed need/reason for oral potassium supplement today, discussed lab value supporting same; discussed platelet lab today, transfusion process, possible reaction and what to report to RN ; consent previously signed and on file, all pt questions answered and pt agrees to proceed

## 2018-03-22 ENCOUNTER — HOSPITAL ENCOUNTER (EMERGENCY)
Facility: HOSPITAL | Age: 83
Discharge: HOME | End: 2018-03-22
Attending: EMERGENCY MEDICINE
Payer: MEDICARE

## 2018-03-22 VITALS
DIASTOLIC BLOOD PRESSURE: 64 MMHG | HEART RATE: 74 BPM | SYSTOLIC BLOOD PRESSURE: 146 MMHG | RESPIRATION RATE: 26 BRPM | OXYGEN SATURATION: 92 %

## 2018-03-22 DIAGNOSIS — E87.6 HYPOKALEMIA: Primary | ICD-10-CM

## 2018-03-22 DIAGNOSIS — R07.9 CHEST PAIN: ICD-10-CM

## 2018-03-22 LAB
ALBUMIN SERPL BCP-MCNC: 2.4 G/DL
ALP SERPL-CCNC: 136 U/L
ALT SERPL W/O P-5'-P-CCNC: 7 U/L
ANION GAP SERPL CALC-SCNC: 11 MMOL/L
ANISOCYTOSIS BLD QL SMEAR: SLIGHT
AST SERPL-CCNC: 16 U/L
BASOPHILS # BLD AUTO: 0 K/UL
BASOPHILS NFR BLD: 0 %
BILIRUB SERPL-MCNC: 0.5 MG/DL
BUN SERPL-MCNC: 10 MG/DL (ref 6–30)
BUN SERPL-MCNC: 11 MG/DL
BURR CELLS BLD QL SMEAR: ABNORMAL
CA-I BLDV-SCNC: 1.08 MMOL/L
CALCIUM SERPL-MCNC: ABNORMAL MG/DL
CHLORIDE SERPL-SCNC: 101 MMOL/L
CHLORIDE SERPL-SCNC: 99 MMOL/L (ref 95–110)
CO2 SERPL-SCNC: 30 MMOL/L
CREAT SERPL-MCNC: 1 MG/DL
CREAT SERPL-MCNC: 1 MG/DL (ref 0.5–1.4)
DIFFERENTIAL METHOD: ABNORMAL
EOSINOPHIL # BLD AUTO: 0 K/UL
EOSINOPHIL NFR BLD: 0 %
ERYTHROCYTE [DISTWIDTH] IN BLOOD BY AUTOMATED COUNT: 12.9 %
EST. GFR  (AFRICAN AMERICAN): >60 ML/MIN/1.73 M^2
EST. GFR  (NON AFRICAN AMERICAN): 53 ML/MIN/1.73 M^2
GLUCOSE SERPL-MCNC: 105 MG/DL (ref 70–110)
GLUCOSE SERPL-MCNC: 99 MG/DL
HCT VFR BLD AUTO: 25.3 %
HCT VFR BLD CALC: 19 %PCV (ref 36–54)
HGB BLD-MCNC: 8.2 G/DL
IMM GRANULOCYTES # BLD AUTO: 0 K/UL
IMM GRANULOCYTES NFR BLD AUTO: 0 %
LYMPHOCYTES # BLD AUTO: 0.2 K/UL
LYMPHOCYTES NFR BLD: 56.8 %
MCH RBC QN AUTO: 27.6 PG
MCHC RBC AUTO-ENTMCNC: 32.4 G/DL
MCV RBC AUTO: 85 FL
MONOCYTES # BLD AUTO: 0.1 K/UL
MONOCYTES NFR BLD: 27 %
NEUTROPHILS # BLD AUTO: 0.1 K/UL
NEUTROPHILS NFR BLD: 16.2 %
NRBC BLD-RTO: 0 /100 WBC
PLATELET # BLD AUTO: 37 K/UL
PLATELET BLD QL SMEAR: ABNORMAL
PMV BLD AUTO: 8.5 FL
POC IONIZED CALCIUM: 1.11 MMOL/L (ref 1.06–1.42)
POC TCO2 (MEASURED): 31 MMOL/L (ref 23–29)
POIKILOCYTOSIS BLD QL SMEAR: SLIGHT
POTASSIUM BLD-SCNC: 2.9 MMOL/L (ref 3.5–5.1)
POTASSIUM SERPL-SCNC: 2.9 MMOL/L
PROT SERPL-MCNC: 7.7 G/DL
RBC # BLD AUTO: 2.97 M/UL
SAMPLE: ABNORMAL
SODIUM BLD-SCNC: 141 MMOL/L (ref 136–145)
SODIUM SERPL-SCNC: 142 MMOL/L
TARGETS BLD QL SMEAR: ABNORMAL
TROPONIN I SERPL DL<=0.01 NG/ML-MCNC: 0.02 NG/ML
WBC # BLD AUTO: 0.37 K/UL

## 2018-03-22 PROCEDURE — 84484 ASSAY OF TROPONIN QUANT: CPT

## 2018-03-22 PROCEDURE — 99284 EMERGENCY DEPT VISIT MOD MDM: CPT | Mod: 25

## 2018-03-22 PROCEDURE — 93010 ELECTROCARDIOGRAM REPORT: CPT | Mod: ,,, | Performed by: INTERNAL MEDICINE

## 2018-03-22 PROCEDURE — 99284 EMERGENCY DEPT VISIT MOD MDM: CPT | Mod: ,,, | Performed by: EMERGENCY MEDICINE

## 2018-03-22 PROCEDURE — 85025 COMPLETE CBC W/AUTO DIFF WBC: CPT

## 2018-03-22 PROCEDURE — 93005 ELECTROCARDIOGRAM TRACING: CPT

## 2018-03-22 PROCEDURE — 25000003 PHARM REV CODE 250: Performed by: EMERGENCY MEDICINE

## 2018-03-22 PROCEDURE — 82565 ASSAY OF CREATININE: CPT

## 2018-03-22 PROCEDURE — 84075 ASSAY ALKALINE PHOSPHATASE: CPT

## 2018-03-22 RX ORDER — POTASSIUM CHLORIDE 20 MEQ/1
40 TABLET, EXTENDED RELEASE ORAL
Status: COMPLETED | OUTPATIENT
Start: 2018-03-22 | End: 2018-03-22

## 2018-03-22 RX ADMIN — POTASSIUM CHLORIDE 40 MEQ: 1500 TABLET, EXTENDED RELEASE ORAL at 09:03

## 2018-03-22 NOTE — ED TRIAGE NOTES
"Patient reports chest heaviness, SOA, and generalized body aches since Monday. Patient was recently told that she has end-stage cancer. Patient states, "how are you supposed to feel when you are told to go home and die". Patient is A&Ox4 and following commands. Patient reports at this time that her symptoms have resolved.   "

## 2018-03-23 LAB
AML FISH ADDITIONAL INFORMATION (BM): NORMAL
AML FISH DISCLAIMER (BM): NORMAL
AML FISH REASON FOR REFERRAL (BM): NORMAL
AML FISH RELEASED BY (BM): NORMAL
AML FISH RESULT (BM): NORMAL
AML FISH RESULT SUMMARY (BM): NORMAL
AML FISH RESULT TABLE (BM): NORMAL
CHROM BANDING METHOD: NORMAL
CHROMOSOME ANALYSIS BM ADDITIONAL INFORMATION: NORMAL
CHROMOSOME ANALYSIS BM RELEASED BY: NORMAL
CHROMOSOME ANALYSIS BM RESULT SUMMARY: NORMAL
CLINICAL CYTOGENETICIST REVIEW: NORMAL
CLINICAL CYTOGENETICIST REVIEW: NORMAL
FAMLB SPECIMEN: NORMAL
KARYOTYP MAR: NORMAL
REASON FOR REFERRAL (NARRATIVE): NORMAL
REF LAB TEST METHOD: NORMAL
REF LAB TEST METHOD: NORMAL
SPECIMEN SOURCE: NORMAL
SPECIMEN SOURCE: NORMAL
SPECIMEN: NORMAL

## 2018-03-23 NOTE — ED NOTES
All discharge instructions reviewed with patient and questions addressed. VSS, NAD noted, and patient A&Ox4. Patient assisted into wheelchair per Rn. All belongings with patient at time of departure. Patient to lobby in wheelchair per patient family member.  
LOC: The patient is awake, alert, and oriented to place, time, situation. Affect is flat.  Speech is appropriate and clear.     APPEARANCE: Patient resting comfortably in no acute distress.  Patient is clean and well groomed.    SKIN: The skin is warm and dry; color consistent with ethnicity.  Patient has normal skin turgor and moist mucus membranes.  Skin intact; no breakdown or bruising noted.     MUSCULOSKELETAL: Patient moving upper and lower extremities without difficulty.  Patient reports generalized weakness.     RESPIRATORY: Airway is open and patent. Lungs clear to auscultation in all lobes. Respirations spontaneous, even, easy, and non-labored.  Patient has a normal effort and rate.  No accessory muscle use noted. Denies cough.     CARDIAC:  Normal rhythm and rate noted.  No peripheral edema noted.  Capillary refill < 3 seconds. No complaints of chest pain      ABDOMEN: Soft and non tender to palpation.  No distention noted. Bowel sounds present x 4.    NEUROLOGIC: PERRLA;  eyes open spontaneously.  Behavior appropriate to situation.  Follows commands; facial expression symmetrical; equal hand grasps bilaterally.  Purposeful motor response noted; normal sensation in all extremities.  
MD Jones notified that patient with critical lab values of: WBC .37; preliminary platelet count 37. No new orders at this time.  
Patient assisted on and off bedpan per RN.  
Patient is awake, alert, and acting age appropriate. Airway is clear and patient. No apparent distress noted at this time. Plan of care updated with patient and all questions addressed. All safety precautions in place. Will continue to monitor.  
Walk in

## 2018-03-23 NOTE — ED PROVIDER NOTES
Encounter Date: 3/22/2018    SCRIBE #1 NOTE: I, Eladia Martinez, am scribing for, and in the presence of,  Nay Jones MD. I have scribed the entire note.       History     Chief Complaint   Patient presents with    Anxiety     x three days, recently diagnosed with Leukemia .     82 y.o.  with MDS, HTN, CKD, hyperthyroidism, anemia, gout, arthritis, and history of PNA presents to the ED complaining of recurrent episodes of CP with associated diaphoresis and SOB for 20 minutes at a time. These episodes have occurred for the last three days. Her CP is described as non-radiating, rated a 10/10 at worst, and without left-sided differential intensity. To control her symptoms, the patient has tried deep breathing and cognitive calming techniques. She does not recall similar episodes in the past. The patient has been very stressed and depressed recently. The date of onset coincides with the date her oncologist informed her that treatment is not working. She denies SI/HI and VH/AH.           The history is provided by the patient and medical records.     Review of patient's allergies indicates:   Allergen Reactions    Sulfa (sulfonamide antibiotics) Itching and Rash     Past Medical History:   Diagnosis Date    Allergy     Anemia     Arthritis     Cataract     CKD (chronic kidney disease)     Gout, unspecified     Hypertension     Hypothyroidism     MDS (myelodysplastic syndrome) 8/14/2017    Menopause     PNA (pneumonia) 10/15/2017    Sickle cell trait     Trigger finger      Past Surgical History:   Procedure Laterality Date    APPENDECTOMY      HEMORRHOID SURGERY      HYSTERECTOMY       Family History   Problem Relation Age of Onset    Hypertension Mother     Peripheral vascular disease Father     Heart disease Father     Cataracts Father     Cancer Son     Diabetes Neg Hx     Amblyopia Neg Hx     Blindness Neg Hx     Glaucoma Neg Hx     Macular degeneration Neg Hx     Retinal  detachment Neg Hx     Strabismus Neg Hx      Social History   Substance Use Topics    Smoking status: Former Smoker     Packs/day: 0.25     Years: 1.00     Quit date: 10/19/1972    Smokeless tobacco: Never Used    Alcohol use No     Review of Systems   Constitutional: Negative for chills and fever.   HENT: Negative for postnasal drip and rhinorrhea.    Respiratory: Negative for cough and shortness of breath.    Cardiovascular: Negative for chest pain, palpitations and leg swelling.   Gastrointestinal: Negative for abdominal pain, constipation, diarrhea and nausea.   Genitourinary: Negative for flank pain.   Skin: Negative for pallor.   Neurological: Negative for weakness and light-headedness.       Physical Exam     Initial Vitals [03/22/18 1602]   BP Pulse Resp Temp SpO2   (!) 147/72 84 19 -- 100 %      MAP       97         Physical Exam    Nursing note and vitals reviewed.  Constitutional: She appears well-developed. She is not diaphoretic. No distress.   HENT:   Head: Normocephalic and atraumatic.   Mouth/Throat: Oropharynx is clear and moist.   No conjunctival pallor.    Neck: Normal range of motion. Neck supple. No JVD present.   Cardiovascular: Normal rate, regular rhythm, normal heart sounds and intact distal pulses.   Pulmonary/Chest: No respiratory distress. She has no wheezes. She has no rales.   Bilateral rhonchial breath sounds.   Abdominal: Soft. She exhibits no distension. There is no tenderness.   Musculoskeletal: Normal range of motion.   No lower extremity edema.   Lymphadenopathy:     She has no cervical adenopathy.   Neurological: She is alert and oriented to person, place, and time. No cranial nerve deficit or sensory deficit.   Skin: Skin is warm and dry.         ED Course   Procedures  Labs Reviewed   CBC W/ AUTO DIFFERENTIAL - Abnormal; Notable for the following:        Result Value    WBC 0.37 (*)     RBC 2.97 (*)     Hemoglobin 8.2 (*)     Hematocrit 25.3 (*)     Platelets 37 (*)     MPV  8.5 (*)     Gran # (ANC) 0.1 (*)     Lymph # 0.2 (*)     Mono # 0.1 (*)     Gran% 16.2 (*)     Lymph% 56.8 (*)     Mono% 27.0 (*)     Platelet Estimate Decreased (*)     All other components within normal limits    Narrative:      PLT & WBC critical result(s) called and verbal readback obtained   from Alton Wynn RN, 03/22/2018 18:28   COMPREHENSIVE METABOLIC PANEL - Abnormal; Notable for the following:     Potassium 2.9 (*)     CO2 30 (*)     Albumin 2.4 (*)     Alkaline Phosphatase 136 (*)     ALT 7 (*)     eGFR if non  53.0 (*)     All other components within normal limits   ISTAT PROCEDURE - Abnormal; Notable for the following:     POC Potassium 2.9 (*)     POC TCO2 (MEASURED) 31 (*)     POC Hematocrit 19 (*)     All other components within normal limits   TROPONIN I   CALCIUM, IONIZED     EKG Readings: (Independently Interpreted)   Initial Reading: No STEMI. Rhythm: Normal Sinus Rhythm. Heart Rate: 72.   Non-specific St changes.          Medical Decision Making:   History:   Old Medical Records: I decided to obtain old medical records.  Old Records Summarized: records from previous admission(s).  Initial Assessment:   Emergent evaluation of 82-year-old female presenting with chest tightness and shortness of breath.  Differential Diagnosis:   ACS, MI, pneumonia, bronchitis, anxiety, panic attack  Clinical Tests:   Lab Tests: Ordered and Reviewed  Medical Tests: Ordered and Reviewed  ED Management:  Patient's well-appearing in no acute distress and bedside.  She states symptoms began approximately 3 days ago after she was told current treatment of MDS is not successful and that the options for future treatments are limited.  EKG and troponin are negative for acute process.  Labs reviewed mild hypokalemia- treated with KCl.  I believe this is likely related to panic attack or anxiety.  Follow up with PCP for further evaluation.            Scribe Attestation:   Scribe #1: I performed the above  scribed service and the documentation accurately describes the services I performed. I attest to the accuracy of the note.    Attending Attestation:           Physician Attestation for Scribe:      Comments: I, Dr. Nay Jones, personally performed the services described in this documentation. All medical record entries made by the scribe were at my direction and in my presence.  I have reviewed the chart and agree that the record reflects my personal performance and is accurate and complete. Nay Jones MD.                 Clinical Impression:   The primary encounter diagnosis was Hypokalemia. A diagnosis of Chest pain was also pertinent to this visit.    Disposition:   Disposition: Discharged  Condition: Stable                        Nay Jones MD  03/24/18 5707

## 2018-03-26 ENCOUNTER — OFFICE VISIT (OUTPATIENT)
Dept: HEMATOLOGY/ONCOLOGY | Facility: CLINIC | Age: 83
End: 2018-03-26
Payer: MEDICARE

## 2018-03-26 VITALS
HEIGHT: 65 IN | HEART RATE: 71 BPM | RESPIRATION RATE: 17 BRPM | OXYGEN SATURATION: 100 % | SYSTOLIC BLOOD PRESSURE: 137 MMHG | DIASTOLIC BLOOD PRESSURE: 62 MMHG | TEMPERATURE: 99 F

## 2018-03-26 DIAGNOSIS — R52 PAIN: Primary | ICD-10-CM

## 2018-03-26 DIAGNOSIS — D46.9 MYELODYSPLASTIC SYNDROME: ICD-10-CM

## 2018-03-26 DIAGNOSIS — Z71.89 GOALS OF CARE, COUNSELING/DISCUSSION: ICD-10-CM

## 2018-03-26 DIAGNOSIS — D61.818 PANCYTOPENIA: ICD-10-CM

## 2018-03-26 DIAGNOSIS — F41.9 ANXIETY: ICD-10-CM

## 2018-03-26 PROCEDURE — 99215 OFFICE O/P EST HI 40 MIN: CPT | Mod: S$GLB,,, | Performed by: INTERNAL MEDICINE

## 2018-03-26 PROCEDURE — 99999 PR PBB SHADOW E&M-EST. PATIENT-LVL III: CPT | Mod: PBBFAC,,, | Performed by: INTERNAL MEDICINE

## 2018-03-26 PROCEDURE — 3078F DIAST BP <80 MM HG: CPT | Mod: CPTII,S$GLB,, | Performed by: INTERNAL MEDICINE

## 2018-03-26 PROCEDURE — 3075F SYST BP GE 130 - 139MM HG: CPT | Mod: CPTII,S$GLB,, | Performed by: INTERNAL MEDICINE

## 2018-03-26 RX ORDER — LORAZEPAM 0.5 MG/1
0.5 TABLET ORAL EVERY 12 HOURS PRN
Qty: 30 TABLET | Refills: 0 | Status: SHIPPED | OUTPATIENT
Start: 2018-03-26 | End: 2018-04-15 | Stop reason: SDUPTHER

## 2018-03-26 RX ORDER — TRAMADOL HYDROCHLORIDE 50 MG/1
50 TABLET ORAL EVERY 8 HOURS PRN
Qty: 30 TABLET | Refills: 0 | Status: SHIPPED | OUTPATIENT
Start: 2018-03-26 | End: 2018-04-05

## 2018-03-26 NOTE — PROGRESS NOTES
SECTION OF HEMATOLOGY AND BONE MARROW TRANSPLANT  Return Patient Visit   03/27/2018    CHIEF COMPLAINT:   Chief Complaint   Patient presents with    Fatigue       HISTORY OF PRESENT ILLNESS:   Shara Rose is a 82 y.o. female who is referred to me July 2017 for a Hematology consultation for further evaluation of anemia. Patient's past medical history includes Sickle Cell Trait, Hypothyroidism, Chronic Kidney Disease.  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    She has subsequently completed 6 cycles of vidaza.  Post cycle 4 and post cycle 6 bone marrow biopsies have showing cytogenetic clonal evolution and increasing blast count.  She has remains dependent on pbrc and plt transfusions with minimal improvement in this during therapy.   At our last appt we discussed limited therapeutic options given progressive disease, frailty, and comorbidities.  She wished to discuss with her children and return today to discuss GOC. She presents with daughter and granddaughter. Remains weak, fatigued. In wheelchair.      PAST MEDICAL HISTORY:   Past Medical History:   Diagnosis Date    Allergy     Anemia     Arthritis     Cataract     CKD (chronic kidney disease)     Gout, unspecified     Hypertension     Hypothyroidism     MDS (myelodysplastic syndrome) 8/14/2017    Menopause     PNA (pneumonia) 10/15/2017    Sickle cell trait     Trigger finger        PAST SURGICAL HISTORY:   Past Surgical History:   Procedure Laterality Date    APPENDECTOMY      HEMORRHOID SURGERY      HYSTERECTOMY         PAST SOCIAL HISTORY:   reports that she quit smoking about 45 years ago. She has a 0.25 pack-year smoking history. She has never used smokeless tobacco. She reports that she does not drink alcohol or use drugs.    FAMILY HISTORY:  Family History   Problem Relation Age of Onset    Hypertension Mother     Peripheral  vascular disease Father     Heart disease Father     Cataracts Father     Cancer Son     Diabetes Neg Hx     Amblyopia Neg Hx     Blindness Neg Hx     Glaucoma Neg Hx     Macular degeneration Neg Hx     Retinal detachment Neg Hx     Strabismus Neg Hx        CURRENT MEDICATIONS:   Current Outpatient Prescriptions   Medication Sig    acyclovir (ZOVIRAX) 200 MG capsule Take 2 capsules (400 mg total) by mouth 2 (two) times daily.    allopurinol (ZYLOPRIM) 100 MG tablet Take 1 tablet (100 mg total) by mouth once daily. For gout    AZACITIDINE (VIDAZA INJ) Inject as directed.    ciprofloxacin HCl (CIPRO) 500 MG tablet Take 1 tablet (500 mg total) by mouth 2 (two) times daily.    clotrimazole-betamethasone 1-0.05% (LOTRISONE) cream Apply topically 2 (two) times daily.    fluconazole (DIFLUCAN) 200 MG Tab Take 2 tablets (400 mg total) by mouth once daily.    gabapentin (NEURONTIN) 100 MG capsule     levothyroxine (SYNTHROID) 25 MCG tablet TAKE 1 TABLET EVERY DAY    meclizine (ANTIVERT) 25 mg tablet TK 1 T PO BID FOR 10 DAYS PRF DIZZINESS    ondansetron (ZOFRAN) 8 MG tablet Take 1 tablet (8 mg total) by mouth every 12 (twelve) hours as needed for Nausea.    pantoprazole (PROTONIX) 40 MG tablet Take 1 tablet (40 mg total) by mouth once daily. While on prednisone    valsartan (DIOVAN) 160 MG tablet Take 1 tablet (160 mg total) by mouth once daily.    verapamil (CALAN-SR) 240 MG CR tablet TAKE 1 TABLET EVERY DAY    VITAMIN B-1 100 MG tablet     LORazepam (ATIVAN) 0.5 MG tablet Take 1 tablet (0.5 mg total) by mouth every 12 (twelve) hours as needed for Anxiety.    traMADol (ULTRAM) 50 mg tablet Take 1 tablet (50 mg total) by mouth every 8 (eight) hours as needed for Pain.     Current Facility-Administered Medications   Medication    sodium chloride 0.9% flush 10 mL     ALLERGIES:   Review of patient's allergies indicates:   Allergen Reactions    Sulfa (sulfonamide antibiotics) Itching and Rash  "          REVIEW OF SYSTEMS:   Review of Systems - General ROS: negative  Psychological ROS: negative  Ophthalmic ROS: negative  Allergy and Immunology ROS: negative  Breast ROS: negative  Respiratory ROS: negative  Cardiovascular ROS: negative  Gastrointestinal ROS: negative  Genito-Urinary ROS: negative  Musculoskeletal ROS: negative  Neurological ROS: negative  Dermatological ROS: negative    PHYSICAL EXAM:   Vitals:    03/26/18 1417   BP: 137/62   Pulse: 71   Resp: 17   Temp: 98.6 °F (37 °C)   TempSrc: Oral   SpO2: 100%   Height: 5' 5" (1.651 m)   PainSc: 0-No pain     General - well developed, well nourished, no apparent distress  HEENT - oropharynx clear  Chest and Lung - clear to auscultation bilaterally   Cardiovascular - RRR with no MGR, normal S1 and S2  Abdomen-  soft, nontender, no palpable hepatomegaly or splenomegaly  Lymph - no palpable lymphadenopathy  Heme - no bruising, petechiae, pallor  Skin - no rashes or lesions  Psych - appropriate mood and affect      ECOG Performance Status: (foot note - ECOG PS provided by [employer]cology [employer]) 1 - Symptomatic but completely ambulatory    Karnofsky Performance Score:  80%- Normal Activity with Effort: Some Symptoms of Disease  DATA:   Lab Results   Component Value Date    WBC 0.54 (LL) 03/26/2018    HGB 7.2 (L) 03/26/2018    HCT 21.4 (L) 03/26/2018    MCV 84 03/26/2018    PLT 8 (LL) 03/26/2018     Gran # (ANC)   Date Value Ref Range Status   03/26/2018 0.1 (L) 1.8 - 7.7 K/uL Final     Gran%   Date Value Ref Range Status   03/26/2018 11.1 (L) 38.0 - 73.0 % Final     Lymph #   Date Value Ref Range Status   03/26/2018 0.3 (L) 1.0 - 4.8 K/uL Final     Lymph%   Date Value Ref Range Status   03/26/2018 63.0 (H) 18.0 - 48.0 % Final     CMP  Sodium   Date Value Ref Range Status   03/26/2018 142 136 - 145 mmol/L Final     Potassium   Date Value Ref Range Status   03/26/2018 3.2 (L) 3.5 - 5.1 mmol/L Final     Chloride   Date Value Ref Range Status "   03/26/2018 102 95 - 110 mmol/L Final     CO2   Date Value Ref Range Status   03/26/2018 29 23 - 29 mmol/L Final     Glucose   Date Value Ref Range Status   03/26/2018 120 (H) 70 - 110 mg/dL Final     BUN, Bld   Date Value Ref Range Status   03/26/2018 14 8 - 23 mg/dL Final     Creatinine   Date Value Ref Range Status   03/26/2018 1.1 0.5 - 1.4 mg/dL Final     Calcium   Date Value Ref Range Status   03/26/2018 9.5 8.7 - 10.5 mg/dL Final     Total Protein   Date Value Ref Range Status   03/26/2018 7.8 6.0 - 8.4 g/dL Final     Albumin   Date Value Ref Range Status   03/26/2018 2.5 (L) 3.5 - 5.2 g/dL Final     Total Bilirubin   Date Value Ref Range Status   03/26/2018 0.7 0.1 - 1.0 mg/dL Final     Comment:     For infants and newborns, interpretation of results should be based  on gestational age, weight and in agreement with clinical  observations.  Premature Infant recommended reference ranges:  Up to 24 hours.............<8.0 mg/dL  Up to 48 hours............<12.0 mg/dL  3-5 days..................<15.0 mg/dL  6-29 days.................<15.0 mg/dL       Alkaline Phosphatase   Date Value Ref Range Status   03/26/2018 122 55 - 135 U/L Final     AST   Date Value Ref Range Status   03/26/2018 14 10 - 40 U/L Final     ALT   Date Value Ref Range Status   03/26/2018 6 (L) 10 - 44 U/L Final     Anion Gap   Date Value Ref Range Status   03/26/2018 11 8 - 16 mmol/L Final     eGFR if    Date Value Ref Range Status   03/26/2018 54.4 (A) >60 mL/min/1.73 m^2 Final     eGFR if non    Date Value Ref Range Status   03/26/2018 47.2 (A) >60 mL/min/1.73 m^2 Final     Comment:     Calculation used to obtain the estimated glomerular filtration  rate (eGFR) is the CKD-EPI equation.        LD   Date Value Ref Range Status   08/03/2017 184 110 - 260 U/L Final     Comment:     Results are increased in hemolyzed samples.     Lab Results   Component Value Date    IRON 122 08/03/2017    TIBC 209 (L) 08/03/2017     FERRITIN 576 (H) 08/03/2017     Lab Results   Component Value Date    EQBEVPGF25 362 08/03/2017     Lab Results   Component Value Date    FOLATE 35.2 (H) 08/03/2017   Pathologist Interpretation TAHIR   Pathologist Interpretation TAHIR   Collected: 08/03/17 1522   Resulting lab: [facility] [place]   Value: REVIEWED   Comment: Electronically reviewed and signed by:   Rhianna Mao MD   Signed on 08/04/17 at 13:32   There is a very faint IgA kappa monoclonal band in beta. - SPEP negative     HEMOGLOBIN ELECTROPHORESIS,HGB A2 UGO.   Order: [id number]   Status:  Final result   Visible to patient:  No (Not Released) Next appt:  None Dx:  Anemia     Ref Range & Units 3yr ago 6yr ago    Hgb A2 Quant 1.5 - 3.8 % 2.4 2.2CM    Hemoglobin Bands   Hb A , Hb S and Hb A2 textCM    Hemoglobin Electrophoresis Interp   See comment    Comments: Hemoglobins A and S are present, measuring approximately 62% and 37%   respectively.  This pattern is compatible with the patient's history   of sickle cell trait, provided the patient has no recent history of   red cell transfusion.  Correlate clinically.   Interpreted by Rhianna Mao M.D.            SPECIMEN-3/15/2018  1) Bone marrow clot, left iliac crest.  2) Bone marrow core biopsy, left iliac crest.  3) Bone marrow aspirate slides.  FINAL PATHOLOGIC DIAGNOSIS  CBC DATA 3/13/2018:  RBC: 2.59 M/UL, H/H :7.1/21.5 , MCV : 83 FL, WBC: 0.47 K/UL, Gran 19.2%, Lymph 57.4 %, Mono 19.1 %,  Eosinophil 0%, Basophil 0%, Platelet: 3 K/UL.  No peripheral blood smear was submitted for evaluation.  LEFT ILIAC CREST BONE MARROW TOUCH PREP (INADEQUATE), BONE MARROW CLOT, AND BONE  MARROW CORE BIOPSY WITH:  CELLULARITY=30-40%.  CONSISTENT WITH MYELODYSPLASTIC SYNDROME WITH EXCESS BLASTS-2 AND FIBROSIS (GRADE 2).  SEE COMMENT.  MARKEDLY DECREASED ERYTHROPOIESIS.  INCREASED PLASMA CELLS.  INCREASED STORAGE IRON.  INCREASED NUMBER OF MEGAKARYOCYTES WITH DYSPLASTIC  MORPHOLOGY.  COMMENT: Flow cytometry analysis of bone marrow aspirate shows lymph gate(41%) containing mostly T cells.  No B cell clonality or T-cell aberrancy is evident. CD4 to CD8 ratio is reversed. Blast gate not increased (15.7%)  with cells expression of CD2 (partial), CD 7 (partial), CD9, CD13, CD34(partial), CD11c (partial),  (partial).  CD33 is negative.  Immunohistochemical studies were performed on the clot and core biopsy for further architecture evaluation with  adequate positive and negative controls. Scattered mixed predominantly T cells (CD3 positive) and B cells (CD20  positive) are evident. Markedly increased plasma cells ( positive) are noted. In situ hybridization for kappa  and lambda light chain is pending. MPO shows myeloid cells. E-cadherin and spectruin highlight occasional  erythroid precursors. About 6-7% CD 34 positive immature cells are evident. CD61 shows increased small  dysplastic megakaryocytes.  Findings are consistent with myelodysplastic syndrome with excess blasts 2 and fibrosis. Patient is evolving into  acute myeloid leukemia. Close follow-up is highly recommended.  Diagnosed by: Catalina Mahajan M.D.  (Electronically Signed: 2018-03-20 11:00:06)    ASSESSMENT AND PLAN:   Encounter Diagnoses   Name Primary?   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%); discontinue vidaza   -long jose j discussion with pt and friend regarding limited therapeutic options with high risk MDS following HMA failure; with poor PS  and comorbidities; discussed typical survival based on R-IPSS data typically measured in months  -discussed DNR/DNI status which she desires; she understands disease is not curable  -discussed referral to outside center for clinical trial which she declined; vs BSC (antibiotics, transfusions) locally  she wishes to pursue BSC with transfusions and prophylactic antibiotics   -will send NGS panel for possible targetable mutations should she progress to AML    -reviewed neutropenic and bleeding precautions with patient  -prn zofran for home use for CINV  -continue transfusion support with plt goal 10; hgb 7; transfuse one unit plt today     MGUS  -for IgA MGUS; anemia likely due to MDS; no hypercalcemia, Cr at baseline;  Minimal bone marrow plasmacytosis; suspect this is clinically insignificant in context of her high risk MDS    2)ID  -completed course of levaquin for CAP on 10/25/17  -transition back to cipro ppx  -contineu acyc, fluc ppx     3)gout  -continue allopurinol     -continue all other chronic medications per pcp     Follow Up:  -weekly cbc, cmp, type and screen    -appt with NP in 2 weeks after labs   Ankit Wang MD  Hematology/Oncology/Bone Marrow Transplant

## 2018-03-26 NOTE — Clinical Note
-please check cbc, cmp, type and screen today -same labs once a week for 2 weeks -NP appt after labs in 2 weeks

## 2018-03-27 ENCOUNTER — INFUSION (OUTPATIENT)
Dept: INFUSION THERAPY | Facility: HOSPITAL | Age: 83
End: 2018-03-27
Attending: INTERNAL MEDICINE
Payer: MEDICARE

## 2018-03-27 VITALS
DIASTOLIC BLOOD PRESSURE: 66 MMHG | HEART RATE: 69 BPM | RESPIRATION RATE: 16 BRPM | TEMPERATURE: 98 F | SYSTOLIC BLOOD PRESSURE: 145 MMHG

## 2018-03-27 DIAGNOSIS — D61.818 PANCYTOPENIA: ICD-10-CM

## 2018-03-27 DIAGNOSIS — D46.Z MDS (MYELODYSPLASTIC SYNDROME), HIGH GRADE: ICD-10-CM

## 2018-03-27 LAB
BLD PROD TYP BPU: NORMAL
BLOOD UNIT EXPIRATION DATE: NORMAL
BLOOD UNIT TYPE CODE: 7300
BLOOD UNIT TYPE: NORMAL
CODING SYSTEM: NORMAL
DISPENSE STATUS: NORMAL
NUM UNITS TRANS WBC-POOR PLATPHERESIS: NORMAL

## 2018-03-27 PROCEDURE — 63600175 PHARM REV CODE 636 W HCPCS: Performed by: INTERNAL MEDICINE

## 2018-03-27 PROCEDURE — P9037 PLATE PHERES LEUKOREDU IRRAD: HCPCS

## 2018-03-27 PROCEDURE — 36430 TRANSFUSION BLD/BLD COMPNT: CPT

## 2018-03-27 PROCEDURE — 25000003 PHARM REV CODE 250: Performed by: INTERNAL MEDICINE

## 2018-03-27 PROCEDURE — 86644 CMV ANTIBODY: CPT

## 2018-03-27 RX ORDER — HYDROCODONE BITARTRATE AND ACETAMINOPHEN 500; 5 MG/1; MG/1
TABLET ORAL ONCE
Status: COMPLETED | OUTPATIENT
Start: 2018-03-27 | End: 2018-03-27

## 2018-03-27 RX ORDER — DIPHENHYDRAMINE HYDROCHLORIDE 50 MG/ML
25 INJECTION INTRAMUSCULAR; INTRAVENOUS
Status: COMPLETED | OUTPATIENT
Start: 2018-03-27 | End: 2018-03-27

## 2018-03-27 RX ORDER — ACETAMINOPHEN 325 MG/1
650 TABLET ORAL
Status: COMPLETED | OUTPATIENT
Start: 2018-03-27 | End: 2018-03-27

## 2018-03-27 RX ADMIN — SODIUM CHLORIDE: 9 INJECTION, SOLUTION INTRAVENOUS at 04:03

## 2018-03-27 RX ADMIN — ACETAMINOPHEN 650 MG: 325 TABLET, FILM COATED ORAL at 04:03

## 2018-03-27 RX ADMIN — DIPHENHYDRAMINE HYDROCHLORIDE 25 MG: 50 INJECTION INTRAMUSCULAR; INTRAVENOUS at 04:03

## 2018-03-27 NOTE — PLAN OF CARE
Problem: Anemia (Adult)  Goal: Identify Related Risk Factors and Signs and Symptoms  Related risk factors and signs and symptoms are identified upon initiation of Human Response Clinical Practice Guideline (CPG)   Outcome: Ongoing (interventions implemented as appropriate)  Patient here for 1 unit plts.  Assessment complete and labs reviewed.  VSS.  Chair reclined and blanket offered.  No needs expressed at this time.  Will continue to monitor.

## 2018-03-27 NOTE — PLAN OF CARE
Problem: Patient Care Overview  Goal: Plan of Care Review  Outcome: Ongoing (interventions implemented as appropriate)  Patient tolerated transfusion well.  No reaction suspected.  VSS.  Educated patient on when to seek medical attention. No questions or concerns. AVS given to patient.  Patient left unit in WC accompanied by her granddaughter.

## 2018-03-30 DIAGNOSIS — D70.9 NEUTROPENIA, UNSPECIFIED TYPE: ICD-10-CM

## 2018-04-02 ENCOUNTER — INFUSION (OUTPATIENT)
Dept: INFUSION THERAPY | Facility: HOSPITAL | Age: 83
End: 2018-04-02
Attending: INTERNAL MEDICINE
Payer: MEDICARE

## 2018-04-02 ENCOUNTER — TELEPHONE (OUTPATIENT)
Dept: HEMATOLOGY/ONCOLOGY | Facility: CLINIC | Age: 83
End: 2018-04-02

## 2018-04-02 VITALS
SYSTOLIC BLOOD PRESSURE: 146 MMHG | TEMPERATURE: 98 F | RESPIRATION RATE: 16 BRPM | DIASTOLIC BLOOD PRESSURE: 61 MMHG | HEART RATE: 71 BPM

## 2018-04-02 DIAGNOSIS — D70.9 NEUTROPENIA, UNSPECIFIED TYPE: ICD-10-CM

## 2018-04-02 DIAGNOSIS — D61.818 PANCYTOPENIA: ICD-10-CM

## 2018-04-02 DIAGNOSIS — D46.9 MDS (MYELODYSPLASTIC SYNDROME): ICD-10-CM

## 2018-04-02 DIAGNOSIS — D46.9 MDS (MYELODYSPLASTIC SYNDROME): Primary | ICD-10-CM

## 2018-04-02 PROCEDURE — 25000003 PHARM REV CODE 250: Performed by: INTERNAL MEDICINE

## 2018-04-02 PROCEDURE — 86920 COMPATIBILITY TEST SPIN: CPT

## 2018-04-02 PROCEDURE — 86644 CMV ANTIBODY: CPT

## 2018-04-02 PROCEDURE — P9040 RBC LEUKOREDUCED IRRADIATED: HCPCS

## 2018-04-02 PROCEDURE — 36430 TRANSFUSION BLD/BLD COMPNT: CPT

## 2018-04-02 RX ORDER — HYDROCODONE BITARTRATE AND ACETAMINOPHEN 500; 5 MG/1; MG/1
TABLET ORAL ONCE
Status: COMPLETED | OUTPATIENT
Start: 2018-04-02 | End: 2018-04-02

## 2018-04-02 RX ORDER — ACETAMINOPHEN 325 MG/1
650 TABLET ORAL
Status: COMPLETED | OUTPATIENT
Start: 2018-04-02 | End: 2018-04-02

## 2018-04-02 RX ORDER — ACETAMINOPHEN 325 MG/1
650 TABLET ORAL
Status: CANCELLED | OUTPATIENT
Start: 2018-04-02

## 2018-04-02 RX ORDER — DIPHENHYDRAMINE HCL 25 MG
25 CAPSULE ORAL
Status: CANCELLED | OUTPATIENT
Start: 2018-04-02

## 2018-04-02 RX ORDER — DIPHENHYDRAMINE HCL 25 MG
25 CAPSULE ORAL
Status: COMPLETED | OUTPATIENT
Start: 2018-04-02 | End: 2018-04-02

## 2018-04-02 RX ORDER — HYDROCODONE BITARTRATE AND ACETAMINOPHEN 500; 5 MG/1; MG/1
TABLET ORAL ONCE
Status: CANCELLED | OUTPATIENT
Start: 2018-04-02 | End: 2018-04-02

## 2018-04-02 RX ADMIN — ACETAMINOPHEN 650 MG: 325 TABLET, FILM COATED ORAL at 12:04

## 2018-04-02 RX ADMIN — SODIUM CHLORIDE: 9 INJECTION, SOLUTION INTRAVENOUS at 12:04

## 2018-04-02 RX ADMIN — DIPHENHYDRAMINE HYDROCHLORIDE 25 MG: 25 CAPSULE ORAL at 12:04

## 2018-04-02 NOTE — PLAN OF CARE
Problem: Anemia (Adult)  Goal: Identify Related Risk Factors and Signs and Symptoms  Related risk factors and signs and symptoms are identified upon initiation of Human Response Clinical Practice Guideline (CPG)   Outcome: Ongoing (interventions implemented as appropriate)  Patient here for 1 unit PRBC.  Assessment complete and labs reviewed.  VSS.  Chair reclined and blanket offered.  No needs expressed at this time.  Will continue to monitor.

## 2018-04-02 NOTE — PLAN OF CARE
Problem: Patient Care Overview  Goal: Plan of Care Review  Outcome: Ongoing (interventions implemented as appropriate)  Patient tolerated transfusion well.  No reaction suspected.  VSS.  Informed patient when to seek medical attention.  No questions or concerns.  AVS given to patient.  Patient left unit in WC accompanied by her granddaughter.

## 2018-04-03 RX ORDER — FLUCONAZOLE 200 MG/1
400 TABLET ORAL DAILY
Qty: 60 TABLET | Refills: 0 | Status: SHIPPED | OUTPATIENT
Start: 2018-04-03 | End: 2018-06-02

## 2018-04-04 RX ORDER — FLUCONAZOLE 200 MG/1
TABLET ORAL
Qty: 60 TABLET | Refills: 0 | Status: SHIPPED | OUTPATIENT
Start: 2018-04-04

## 2018-04-09 ENCOUNTER — OFFICE VISIT (OUTPATIENT)
Dept: HEMATOLOGY/ONCOLOGY | Facility: CLINIC | Age: 83
End: 2018-04-09
Payer: MEDICARE

## 2018-04-09 ENCOUNTER — TELEPHONE (OUTPATIENT)
Dept: HEMATOLOGY/ONCOLOGY | Facility: CLINIC | Age: 83
End: 2018-04-09

## 2018-04-09 ENCOUNTER — INFUSION (OUTPATIENT)
Dept: INFUSION THERAPY | Facility: HOSPITAL | Age: 83
End: 2018-04-09
Attending: INTERNAL MEDICINE
Payer: MEDICARE

## 2018-04-09 VITALS
SYSTOLIC BLOOD PRESSURE: 173 MMHG | DIASTOLIC BLOOD PRESSURE: 74 MMHG | RESPIRATION RATE: 17 BRPM | TEMPERATURE: 98 F | HEIGHT: 65 IN | WEIGHT: 115.06 LBS | HEART RATE: 62 BPM | OXYGEN SATURATION: 96 % | BODY MASS INDEX: 19.17 KG/M2

## 2018-04-09 VITALS
SYSTOLIC BLOOD PRESSURE: 168 MMHG | HEART RATE: 74 BPM | RESPIRATION RATE: 18 BRPM | DIASTOLIC BLOOD PRESSURE: 69 MMHG | TEMPERATURE: 98 F

## 2018-04-09 DIAGNOSIS — M1A.9XX0 CHRONIC GOUT WITHOUT TOPHUS, UNSPECIFIED CAUSE, UNSPECIFIED SITE: ICD-10-CM

## 2018-04-09 DIAGNOSIS — E03.9 HYPOTHYROIDISM, UNSPECIFIED TYPE: ICD-10-CM

## 2018-04-09 DIAGNOSIS — D61.818 PANCYTOPENIA: ICD-10-CM

## 2018-04-09 DIAGNOSIS — D46.9 MDS (MYELODYSPLASTIC SYNDROME): Primary | ICD-10-CM

## 2018-04-09 DIAGNOSIS — D46.9 MDS (MYELODYSPLASTIC SYNDROME): ICD-10-CM

## 2018-04-09 DIAGNOSIS — R51.9 NEW ONSET HEADACHE: ICD-10-CM

## 2018-04-09 DIAGNOSIS — I10 ESSENTIAL HYPERTENSION: ICD-10-CM

## 2018-04-09 PROCEDURE — 25000003 PHARM REV CODE 250: Performed by: NURSE PRACTITIONER

## 2018-04-09 PROCEDURE — P9038 RBC IRRADIATED: HCPCS

## 2018-04-09 PROCEDURE — 3077F SYST BP >= 140 MM HG: CPT | Mod: CPTII,S$GLB,, | Performed by: NURSE PRACTITIONER

## 2018-04-09 PROCEDURE — 36430 TRANSFUSION BLD/BLD COMPNT: CPT

## 2018-04-09 PROCEDURE — 86920 COMPATIBILITY TEST SPIN: CPT

## 2018-04-09 PROCEDURE — P9037 PLATE PHERES LEUKOREDU IRRAD: HCPCS

## 2018-04-09 PROCEDURE — 99999 PR PBB SHADOW E&M-EST. PATIENT-LVL IV: CPT | Mod: PBBFAC,,, | Performed by: NURSE PRACTITIONER

## 2018-04-09 PROCEDURE — 86644 CMV ANTIBODY: CPT | Mod: 59

## 2018-04-09 PROCEDURE — 99215 OFFICE O/P EST HI 40 MIN: CPT | Mod: S$GLB,,, | Performed by: NURSE PRACTITIONER

## 2018-04-09 PROCEDURE — 3078F DIAST BP <80 MM HG: CPT | Mod: CPTII,S$GLB,, | Performed by: NURSE PRACTITIONER

## 2018-04-09 RX ORDER — ACETAMINOPHEN 325 MG/1
650 TABLET ORAL
Status: CANCELLED | OUTPATIENT
Start: 2018-04-09

## 2018-04-09 RX ORDER — DIPHENHYDRAMINE HCL 25 MG
25 CAPSULE ORAL
Status: COMPLETED | OUTPATIENT
Start: 2018-04-09 | End: 2018-04-09

## 2018-04-09 RX ORDER — DIPHENHYDRAMINE HCL 25 MG
25 CAPSULE ORAL
Status: CANCELLED | OUTPATIENT
Start: 2018-04-09

## 2018-04-09 RX ORDER — HYDROCODONE BITARTRATE AND ACETAMINOPHEN 500; 5 MG/1; MG/1
TABLET ORAL ONCE
Status: COMPLETED | OUTPATIENT
Start: 2018-04-09 | End: 2018-04-09

## 2018-04-09 RX ORDER — HYDROCODONE BITARTRATE AND ACETAMINOPHEN 500; 5 MG/1; MG/1
TABLET ORAL ONCE
Status: CANCELLED | OUTPATIENT
Start: 2018-04-09 | End: 2018-04-09

## 2018-04-09 RX ORDER — ACETAMINOPHEN 325 MG/1
650 TABLET ORAL
Status: COMPLETED | OUTPATIENT
Start: 2018-04-09 | End: 2018-04-09

## 2018-04-09 RX ADMIN — DIPHENHYDRAMINE HYDROCHLORIDE 25 MG: 25 CAPSULE ORAL at 01:04

## 2018-04-09 RX ADMIN — ACETAMINOPHEN 650 MG: 325 TABLET, FILM COATED ORAL at 01:04

## 2018-04-09 RX ADMIN — SODIUM CHLORIDE: 9 INJECTION, SOLUTION INTRAVENOUS at 01:04

## 2018-04-09 NOTE — Clinical Note
CBC, T&S, CMP, Mg, Phos 4/12/18.  CBC, T&S, CMP, Mg, Phos, LDH, uric acid & MD Appt with Dr. Wang on 4/16/18.

## 2018-04-09 NOTE — PROGRESS NOTES
SECTION OF HEMATOLOGY AND BONE MARROW TRANSPLANT  Return Patient Visit   04/09/2018    CHIEF COMPLAINT:   No chief complaint on file.      HISTORY OF PRESENT ILLNESS:   Shara Rose is a 82 y.o. female who is referred to Dr. Wang in July 2017 for a Hematology consultation for further evaluation of anemia. Patient's past medical history includes Sickle Cell Trait, Hypothyroidism, Chronic Kidney Disease.  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    She has subsequently completed 6 cycles of vidaza.  Post cycle 4 and post cycle 6 bone marrow biopsies have showing cytogenetic clonal evolution and increasing blast count.  She has remains dependent on pbrc and plt transfusions with minimal improvement in this during therapy.   Dr. Wang has discussed limited therapeutic options given progressive disease, frailty, and comorbidities. She presents with daughter and granddaughter. Remains weak, fatigued. In wheelchair. Complains of new headache and epistaxis x2 days. Denies melena, BRBPR, or hematuria. Pt is hypertensive today and states she has not taken her BP meds today; I educated patient that she does not need to hold these for clinic visits. Denies fevers, chills, night sweats, nausea, vomiting, diarrhea, or rash. Reports fatigue and decreased appetite. Drinking plenty of water however. Taking tramadol for headaches.     PAST MEDICAL HISTORY:   Past Medical History:   Diagnosis Date    Allergy     Anemia     Arthritis     Cataract     CKD (chronic kidney disease)     Gout, unspecified     Hypertension     Hypothyroidism     MDS (myelodysplastic syndrome) 8/14/2017    Menopause     PNA (pneumonia) 10/15/2017    Sickle cell trait     Trigger finger        PAST SURGICAL HISTORY:   Past Surgical History:   Procedure Laterality Date    APPENDECTOMY      HEMORRHOID SURGERY      HYSTERECTOMY          PAST SOCIAL HISTORY:   reports that she quit smoking about 45 years ago. She has a 0.25 pack-year smoking history. She has never used smokeless tobacco. She reports that she does not drink alcohol or use drugs.    FAMILY HISTORY:  Family History   Problem Relation Age of Onset    Hypertension Mother     Peripheral vascular disease Father     Heart disease Father     Cataracts Father     Cancer Son     Diabetes Neg Hx     Amblyopia Neg Hx     Blindness Neg Hx     Glaucoma Neg Hx     Macular degeneration Neg Hx     Retinal detachment Neg Hx     Strabismus Neg Hx        CURRENT MEDICATIONS:   Current Outpatient Prescriptions   Medication Sig    acyclovir (ZOVIRAX) 200 MG capsule Take 2 capsules (400 mg total) by mouth 2 (two) times daily.    allopurinol (ZYLOPRIM) 100 MG tablet Take 1 tablet (100 mg total) by mouth once daily. For gout    AZACITIDINE (VIDAZA INJ) Inject as directed.    ciprofloxacin HCl (CIPRO) 500 MG tablet Take 1 tablet (500 mg total) by mouth 2 (two) times daily.    clotrimazole-betamethasone 1-0.05% (LOTRISONE) cream Apply topically 2 (two) times daily.    fluconazole (DIFLUCAN) 200 MG Tab TAKE 2 TABLETS(400 MG) BY MOUTH EVERY DAY    fluconazole (DIFLUCAN) 200 MG Tab Take 2 tablets (400 mg total) by mouth once daily.    gabapentin (NEURONTIN) 100 MG capsule     levothyroxine (SYNTHROID) 25 MCG tablet TAKE 1 TABLET EVERY DAY    LORazepam (ATIVAN) 0.5 MG tablet Take 1 tablet (0.5 mg total) by mouth every 12 (twelve) hours as needed for Anxiety.    meclizine (ANTIVERT) 25 mg tablet TK 1 T PO BID FOR 10 DAYS PRF DIZZINESS    ondansetron (ZOFRAN) 8 MG tablet Take 1 tablet (8 mg total) by mouth every 12 (twelve) hours as needed for Nausea.    pantoprazole (PROTONIX) 40 MG tablet Take 1 tablet (40 mg total) by mouth once daily. While on prednisone    valsartan (DIOVAN) 160 MG tablet Take 1 tablet (160 mg total) by mouth once daily.    verapamil (CALAN-SR) 240 MG CR tablet  "TAKE 1 TABLET EVERY DAY    VITAMIN B-1 100 MG tablet      Current Facility-Administered Medications   Medication    sodium chloride 0.9% flush 10 mL     ALLERGIES:   Review of patient's allergies indicates:   Allergen Reactions    Sulfa (sulfonamide antibiotics) Itching and Rash           REVIEW OF SYSTEMS:   Review of Systems - General ROS: fatigue, weak, decreased appetite, epistaxis  Psychological ROS: negative  Ophthalmic ROS: negative  Allergy and Immunology ROS: negative  Breast ROS: negative  Respiratory ROS: negative  Cardiovascular ROS: negative  Gastrointestinal ROS: negative  Genito-Urinary ROS: negative  Musculoskeletal ROS: negative  Neurological ROS: headache   Dermatological ROS: negative    PHYSICAL EXAM:   Vitals:    04/09/18 1144   BP: (!) 173/74   Pulse: 62   Resp: 17   Temp: 97.6 °F (36.4 °C)   TempSrc: Oral   SpO2: 96%   Weight: 52.2 kg (115 lb 1.3 oz)   Height: 5' 5" (1.651 m)   PainSc: 0-No pain     General - thin, in wheelchair, no apparent distress  HEENT - oropharynx clear  Chest and Lung - clear and diminished to auscultation bilaterally   Cardiovascular - RRR with no MGR, normal S1 and S2  Abdomen-  soft, nontender, no palpable hepatomegaly or splenomegaly  Lymph - no palpable lymphadenopathy  Heme - no bruising, petechiae, pallor  Skin - no rashes or lesions  Psych - appropriate mood and affect  Neuro- PERRL    ECOG Performance Status: (foot note - ECOG PS provided by Eastern Cooperative Oncology Group) 3 - Symptomatic, >50% confined to bed    Karnofsky Performance Score:  50%- Requires Considerable Assistance and Frequent Medical Care  DATA:   Lab Results   Component Value Date    WBC 0.64 (LL) 04/09/2018    HGB 6.3 (L) 04/09/2018    HCT 18.8 (LL) 04/09/2018    MCV 85 04/09/2018    PLT SEE COMMENT 04/09/2018     Gran # (ANC)   Date Value Ref Range Status   04/09/2018 Test Not Performed 1.8 - 7.7 K/uL Corrected     Comment:     Corrected result; previously reported as 0.0 on " 04/09/2018 at 11:53.     Gran%   Date Value Ref Range Status   04/09/2018 4.3 (L) 38.0 - 73.0 % Corrected     Comment:     Corrected result; previously reported as 4.7 on 04/09/2018 at 11:53.     Lymph #   Date Value Ref Range Status   04/09/2018 Test Not Performed 1.0 - 4.8 K/uL Corrected     Comment:     Corrected result; previously reported as 0.4 on 04/09/2018 at 11:53.     Lymph%   Date Value Ref Range Status   04/09/2018 82.6 (H) 18.0 - 48.0 % Corrected     Comment:     Corrected result; previously reported as 59.4 on 04/09/2018 at 11:53.     CMP  Sodium   Date Value Ref Range Status   04/09/2018 138 136 - 145 mmol/L Final     Potassium   Date Value Ref Range Status   04/09/2018 2.8 (L) 3.5 - 5.1 mmol/L Final     Chloride   Date Value Ref Range Status   04/09/2018 96 95 - 110 mmol/L Final     CO2   Date Value Ref Range Status   04/09/2018 32 (H) 23 - 29 mmol/L Final     Glucose   Date Value Ref Range Status   04/09/2018 121 (H) 70 - 110 mg/dL Final     BUN, Bld   Date Value Ref Range Status   04/09/2018 12 8 - 23 mg/dL Final     Creatinine   Date Value Ref Range Status   04/09/2018 0.8 0.5 - 1.4 mg/dL Final     Calcium   Date Value Ref Range Status   04/09/2018 10.1 8.7 - 10.5 mg/dL Final     Total Protein   Date Value Ref Range Status   04/09/2018 7.7 6.0 - 8.4 g/dL Final     Albumin   Date Value Ref Range Status   04/09/2018 2.1 (L) 3.5 - 5.2 g/dL Final     Total Bilirubin   Date Value Ref Range Status   04/09/2018 0.8 0.1 - 1.0 mg/dL Final     Comment:     For infants and newborns, interpretation of results should be based  on gestational age, weight and in agreement with clinical  observations.  Premature Infant recommended reference ranges:  Up to 24 hours.............<8.0 mg/dL  Up to 48 hours............<12.0 mg/dL  3-5 days..................<15.0 mg/dL  6-29 days.................<15.0 mg/dL       Alkaline Phosphatase   Date Value Ref Range Status   04/09/2018 100 55 - 135 U/L Final     AST   Date Value  Ref Range Status   04/09/2018 27 10 - 40 U/L Final     ALT   Date Value Ref Range Status   04/09/2018 10 10 - 44 U/L Final     Anion Gap   Date Value Ref Range Status   04/09/2018 10 8 - 16 mmol/L Final     eGFR if    Date Value Ref Range Status   04/09/2018 >60.0 >60 mL/min/1.73 m^2 Final     eGFR if non    Date Value Ref Range Status   04/09/2018 >60.0 >60 mL/min/1.73 m^2 Final     Comment:     Calculation used to obtain the estimated glomerular filtration  rate (eGFR) is the CKD-EPI equation.        LD   Date Value Ref Range Status   08/03/2017 184 110 - 260 U/L Final     Comment:     Results are increased in hemolyzed samples.     Lab Results   Component Value Date    IRON 122 08/03/2017    TIBC 209 (L) 08/03/2017    FERRITIN 576 (H) 08/03/2017     Lab Results   Component Value Date    [id number] 362 08/03/2017     Lab Results   Component Value Date    FOLATE 35.2 (H) 08/03/2017   Pathologist Interpretation TAHIR   Pathologist Interpretation TAHIR   Collected: 08/03/17 1522   Resulting lab: [facility]   Value: REVIEWED   Comment: Electronically reviewed and signed by:   Rhianna Mao MD   Signed on 08/04/17 at 13:32   There is a very faint IgA kappa monoclonal band in beta. - SPEP negative     HEMOGLOBIN ELECTROPHORESIS,HGB A2 UGO.   Order: [id number]   Status:  Final result   Visible to patient:  No (Not Released) Next appt:  None Dx:  Anemia     Ref Range & Units 3yr ago 6yr ago    Hgb A2 Quant 1.5 - 3.8 % 2.4 2.2CM    Hemoglobin Bands   Hb A , Hb S and Hb A2 textCM    Hemoglobin Electrophoresis Interp   See comment    Comments: Hemoglobins A and S are present, measuring approximately 62% and 37%   respectively.  This pattern is compatible with the patient's history   of sickle cell trait, provided the patient has no recent history of   red cell transfusion.  Correlate clinically.   Interpreted by Rhianna Mao M.D.             SPECIMEN-3/15/2018  1) Bone marrow clot, left iliac crest.  2) Bone marrow core biopsy, left iliac crest.  3) Bone marrow aspirate slides.  FINAL PATHOLOGIC DIAGNOSIS  CBC DATA 3/13/2018:  RBC: 2.59 M/UL, H/H :7.1/21.5 , MCV : 83 FL, WBC: 0.47 K/UL, Gran 19.2%, Lymph 57.4 %, Mono 19.1 %,  Eosinophil 0%, Basophil 0%, Platelet: 3 K/UL.  No peripheral blood smear was submitted for evaluation.  LEFT ILIAC CREST BONE MARROW TOUCH PREP (INADEQUATE), BONE MARROW CLOT, AND BONE  MARROW CORE BIOPSY WITH:  CELLULARITY=30-40%.  CONSISTENT WITH MYELODYSPLASTIC SYNDROME WITH EXCESS BLASTS-2 AND FIBROSIS (GRADE 2).  SEE COMMENT.  MARKEDLY DECREASED ERYTHROPOIESIS.  INCREASED PLASMA CELLS.  INCREASED STORAGE IRON.  INCREASED NUMBER OF MEGAKARYOCYTES WITH DYSPLASTIC MORPHOLOGY.  COMMENT: Flow cytometry analysis of bone marrow aspirate shows lymph gate(41%) containing mostly T cells.  No B cell clonality or T-cell aberrancy is evident. CD4 to CD8 ratio is reversed. Blast gate not increased (15.7%)  with cells expression of CD2 (partial), CD 7 (partial), CD9, CD13, CD34(partial), CD11c (partial),  (partial).  CD33 is negative.  Immunohistochemical studies were performed on the clot and core biopsy for further architecture evaluation with  adequate positive and negative controls. Scattered mixed predominantly T cells (CD3 positive) and B cells (CD20  positive) are evident. Markedly increased plasma cells ( positive) are noted. In situ hybridization for kappa  and lambda light chain is pending. MPO shows myeloid cells. E-cadherin and spectruin highlight occasional  erythroid precursors. About 6-7% CD 34 positive immature cells are evident. CD61 shows increased small  dysplastic megakaryocytes.  Findings are consistent with myelodysplastic syndrome with excess blasts 2 and fibrosis. Patient is evolving into  acute myeloid leukemia. Close follow-up is highly recommended.  Diagnosed by: Catalina Mahajan M.D.  (Electronically  Signed: 2018-03-20 11:00:06)    ASSESSMENT AND PLAN:   Encounter Diagnoses   Name Primary?   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     MGUS  -for IgA MGUS; anemia likely due to MDS; no hypercalcemia, Cr at baseline;  Minimal bone marrow plasmacytosis; suspect this is clinically insignificant in context of her high risk MDS    ID  -completed course of levaquin for CAP on 10/25/17  -continue acyc, fluc, cipro ppx     gout  -continue allopurinol      HTN  - patient held BP meds prior to clinic visit (valsartan and verapamil)  - educated on the importance of taking BP meds; pt states she will take them when she returns home     New onset headache  - x2 days; severe new headache; will obtain head CT with plt count of 0 today to eval for hemorrhage; no falls at home; no  neurologic deficit noted on examination    Hypothyroidism  - continue synthroid    Chronic Gout  - continue allopurinol     Follow Up:  CBC, T&S, CMP, Mg, Phos on 4/12/18.  MD appt with Dr. Wang to discuss goals of care and poor prognosis 4/16/18 with labs.    Plan of care discussed with collaborating physician, Dr. Kathleen Teague, DNP, FNP  Hematology/Bone Marrow Transplant

## 2018-04-09 NOTE — PLAN OF CARE
Problem: Patient Care Overview  Goal: Plan of Care Review  Outcome: Ongoing (interventions implemented as appropriate)  Pt tolerated 1u PRBCs and 1u plts with no complications. VSS. Pt instructed to call MD with any problems. NAD. Pt discharged home with daughter at side.

## 2018-04-09 NOTE — LETTER
April 9, 2018      [facility]  [address]  [facility] [address]  Phone: [phone]       Patient: Shara Rose   YOB: 1935  Date of Visit: 04/09/2018    To Whom It May Concern:    Earl Rose  Is being treated at [facility] on 04/09/2018. She is very ill and requiring caregivers in the home.  Her daughter and granddaughter (Ms. Milana Leach & Ms. Edson Richardson) have been her caregivers during this time.  Please excuse them from work from April 9, 2016 until April 20, 2016 so they may care for their family member during this time. If you have any questions or concerns, or if I can be of further assistance, please do not hesitate to contact me.    Sincerely,    Diana Teague NP  [phone]

## 2018-04-10 ENCOUNTER — HOSPITAL ENCOUNTER (OUTPATIENT)
Dept: RADIOLOGY | Facility: HOSPITAL | Age: 83
Discharge: HOME | End: 2018-04-10
Attending: NURSE PRACTITIONER
Payer: MEDICARE

## 2018-04-10 DIAGNOSIS — D61.818 PANCYTOPENIA: ICD-10-CM

## 2018-04-10 DIAGNOSIS — R51.9 NEW ONSET HEADACHE: ICD-10-CM

## 2018-04-10 PROCEDURE — 70450 CT HEAD/BRAIN W/O DYE: CPT | Mod: TC

## 2018-04-10 PROCEDURE — 70450 CT HEAD/BRAIN W/O DYE: CPT | Mod: 26,,, | Performed by: RADIOLOGY

## 2018-04-15 DIAGNOSIS — F41.9 ANXIETY: ICD-10-CM

## 2018-04-16 ENCOUNTER — OFFICE VISIT (OUTPATIENT)
Dept: HEMATOLOGY/ONCOLOGY | Facility: CLINIC | Age: 83
End: 2018-04-16
Payer: MEDICARE

## 2018-04-16 ENCOUNTER — INFUSION (OUTPATIENT)
Dept: INFUSION THERAPY | Facility: HOSPITAL | Age: 83
End: 2018-04-16
Attending: INTERNAL MEDICINE
Payer: MEDICARE

## 2018-04-16 ENCOUNTER — TELEPHONE (OUTPATIENT)
Dept: HEMATOLOGY/ONCOLOGY | Facility: CLINIC | Age: 83
End: 2018-04-16

## 2018-04-16 VITALS
SYSTOLIC BLOOD PRESSURE: 109 MMHG | DIASTOLIC BLOOD PRESSURE: 59 MMHG | TEMPERATURE: 97 F | HEART RATE: 77 BPM | RESPIRATION RATE: 16 BRPM

## 2018-04-16 VITALS
TEMPERATURE: 99 F | HEIGHT: 65 IN | SYSTOLIC BLOOD PRESSURE: 110 MMHG | OXYGEN SATURATION: 99 % | RESPIRATION RATE: 16 BRPM | DIASTOLIC BLOOD PRESSURE: 57 MMHG | HEART RATE: 120 BPM

## 2018-04-16 DIAGNOSIS — R11.2 NAUSEA AND VOMITING, INTRACTABILITY OF VOMITING NOT SPECIFIED, UNSPECIFIED VOMITING TYPE: ICD-10-CM

## 2018-04-16 DIAGNOSIS — D46.9 MDS (MYELODYSPLASTIC SYNDROME): Primary | ICD-10-CM

## 2018-04-16 DIAGNOSIS — D46.9 MDS (MYELODYSPLASTIC SYNDROME): ICD-10-CM

## 2018-04-16 DIAGNOSIS — Z71.89 GOALS OF CARE, COUNSELING/DISCUSSION: ICD-10-CM

## 2018-04-16 DIAGNOSIS — Z71.89 GOALS OF CARE, COUNSELING/DISCUSSION: Primary | ICD-10-CM

## 2018-04-16 PROCEDURE — 3078F DIAST BP <80 MM HG: CPT | Mod: CPTII,S$GLB,, | Performed by: INTERNAL MEDICINE

## 2018-04-16 PROCEDURE — 96365 THER/PROPH/DIAG IV INF INIT: CPT

## 2018-04-16 PROCEDURE — 3074F SYST BP LT 130 MM HG: CPT | Mod: CPTII,S$GLB,, | Performed by: INTERNAL MEDICINE

## 2018-04-16 PROCEDURE — P9040 RBC LEUKOREDUCED IRRADIATED: HCPCS

## 2018-04-16 PROCEDURE — 86644 CMV ANTIBODY: CPT | Mod: 59

## 2018-04-16 PROCEDURE — 99214 OFFICE O/P EST MOD 30 MIN: CPT | Mod: S$GLB,,, | Performed by: INTERNAL MEDICINE

## 2018-04-16 PROCEDURE — 25000003 PHARM REV CODE 250: Performed by: INTERNAL MEDICINE

## 2018-04-16 PROCEDURE — 99999 PR PBB SHADOW E&M-EST. PATIENT-LVL III: CPT | Mod: PBBFAC,,, | Performed by: INTERNAL MEDICINE

## 2018-04-16 PROCEDURE — 96361 HYDRATE IV INFUSION ADD-ON: CPT

## 2018-04-16 PROCEDURE — 36430 TRANSFUSION BLD/BLD COMPNT: CPT

## 2018-04-16 PROCEDURE — P9037 PLATE PHERES LEUKOREDU IRRAD: HCPCS

## 2018-04-16 PROCEDURE — 63600175 PHARM REV CODE 636 W HCPCS: Performed by: INTERNAL MEDICINE

## 2018-04-16 PROCEDURE — 86920 COMPATIBILITY TEST SPIN: CPT

## 2018-04-16 RX ORDER — HYDROCODONE BITARTRATE AND ACETAMINOPHEN 500; 5 MG/1; MG/1
TABLET ORAL ONCE
Status: DISCONTINUED | OUTPATIENT
Start: 2018-04-16 | End: 2018-04-16 | Stop reason: HOSPADM

## 2018-04-16 RX ORDER — DIPHENHYDRAMINE HYDROCHLORIDE 50 MG/ML
25 INJECTION INTRAMUSCULAR; INTRAVENOUS
Status: CANCELLED | OUTPATIENT
Start: 2018-04-16

## 2018-04-16 RX ORDER — ONDANSETRON 2 MG/ML
8 INJECTION INTRAMUSCULAR; INTRAVENOUS
Status: COMPLETED | OUTPATIENT
Start: 2018-04-16 | End: 2018-04-16

## 2018-04-16 RX ORDER — ONDANSETRON 2 MG/ML
8 INJECTION INTRAMUSCULAR; INTRAVENOUS
Status: CANCELLED
Start: 2018-04-16 | End: 2018-04-16

## 2018-04-16 RX ORDER — DIPHENHYDRAMINE HYDROCHLORIDE 50 MG/ML
25 INJECTION INTRAMUSCULAR; INTRAVENOUS
Status: COMPLETED | OUTPATIENT
Start: 2018-04-16 | End: 2018-04-16

## 2018-04-16 RX ORDER — HYDROCODONE BITARTRATE AND ACETAMINOPHEN 500; 5 MG/1; MG/1
TABLET ORAL ONCE
Status: CANCELLED | OUTPATIENT
Start: 2018-04-16 | End: 2018-04-16

## 2018-04-16 RX ORDER — ACETAMINOPHEN 325 MG/1
650 TABLET ORAL
Status: COMPLETED | OUTPATIENT
Start: 2018-04-16 | End: 2018-04-16

## 2018-04-16 RX ORDER — LORAZEPAM 0.5 MG/1
TABLET ORAL
Qty: 30 TABLET | Refills: 0 | Status: SHIPPED | OUTPATIENT
Start: 2018-04-16

## 2018-04-16 RX ORDER — ACETAMINOPHEN 325 MG/1
650 TABLET ORAL
Status: CANCELLED | OUTPATIENT
Start: 2018-04-16

## 2018-04-16 RX ORDER — ONDANSETRON 2 MG/ML
8 INJECTION INTRAMUSCULAR; INTRAVENOUS
Status: DISCONTINUED | OUTPATIENT
Start: 2018-04-16 | End: 2018-04-16

## 2018-04-16 RX ADMIN — DIPHENHYDRAMINE HYDROCHLORIDE 25 MG: 50 INJECTION INTRAMUSCULAR; INTRAVENOUS at 02:04

## 2018-04-16 RX ADMIN — ACETAMINOPHEN 650 MG: 325 TABLET, FILM COATED ORAL at 02:04

## 2018-04-16 RX ADMIN — ONDANSETRON 8 MG: 2 INJECTION, SOLUTION INTRAMUSCULAR; INTRAVENOUS at 02:04

## 2018-04-16 RX ADMIN — SODIUM CHLORIDE: 0.9 INJECTION, SOLUTION INTRAVENOUS at 02:04

## 2018-04-16 NOTE — LETTER
April 16, 2018      [facility]  [address]  [facility] [address]  Phone: [phone]       Patient: Shara Rose   YOB: 1935  Date of Visit: 04/16/2018    To Whom It May Concern:  I care for   Earl Rose    at [facility] on 04/16/2018.  The patient and her  are incapacitated during April 9 to April 20th, 2018.   Milana Leach will be a primary, full time  caretaker for these patients.  Please excuse Mrs. Leach during this period.    If there are any questions regarding this matter, please do not hesitate to contact me.     Sincerely,    Juan Wang MD

## 2018-04-16 NOTE — PLAN OF CARE
Problem: Patient Care Overview  Goal: Plan of Care Review  Outcome: Ongoing (interventions implemented as appropriate)  Pt. Tolerated infusions and transfusions today. VSS throughout. PIV flushed and removed. Pt discharged home with family.

## 2018-04-16 NOTE — PLAN OF CARE
Problem: Anemia (Adult)  Goal: Identify Related Risk Factors and Signs and Symptoms  Related risk factors and signs and symptoms are identified upon initiation of Human [employer] Clinical Practice Guideline (CPG)   Outcome: Ongoing (interventions implemented as appropriate)  Pt.  Here today for 1 liter of NS, zofran, 1 unit PRBC and 1 unite platelets. Denies bleeding or bruising. Reports nausea, vomitting and poor oral intake. Reports fatigue and SOB.

## 2018-04-16 NOTE — PROGRESS NOTES
SECTION OF HEMATOLOGY AND BONE MARROW TRANSPLANT  Return Patient Visit   04/16/2018    CHIEF COMPLAINT:   Chief Complaint   Patient presents with    Fatigue       HISTORY OF PRESENT ILLNESS:   Shara Rose is a 83 y.o. 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  Nauseated over last 2 days. Weak.  Becoming confused at home per family. Afebrile.      PAST MEDICAL HISTORY:   Past Medical History:   Diagnosis Date    Allergy     Anemia     Arthritis     Cataract     CKD (chronic kidney disease)     Gout, unspecified     Hypertension     Hypothyroidism     MDS (myelodysplastic syndrome) 8/14/2017    Menopause     PNA (pneumonia) 10/15/2017    Sickle cell trait     Trigger finger        PAST SURGICAL HISTORY:   Past Surgical History:   Procedure Laterality Date    APPENDECTOMY      HEMORRHOID SURGERY      HYSTERECTOMY         PAST SOCIAL HISTORY:   reports that she quit smoking about 45 years ago. She has a 0.25 pack-year smoking history. She has never used smokeless tobacco. She reports that she does not drink alcohol or use drugs.    FAMILY HISTORY:  Family History   Problem Relation Age of Onset    Hypertension Mother     Peripheral vascular disease Father     Heart disease Father     Cataracts Father     Cancer Son     Diabetes Neg Hx     Amblyopia Neg Hx     Blindness Neg Hx     Glaucoma Neg Hx     Macular degeneration Neg Hx     Retinal detachment Neg Hx     Strabismus Neg Hx        CURRENT MEDICATIONS:   Current Outpatient Prescriptions   Medication Sig    acyclovir (ZOVIRAX) 200 MG capsule Take 2 capsules (400 mg total) by mouth 2 (two) times daily.    allopurinol (ZYLOPRIM) 100 MG tablet Take 1 tablet (100 mg total) by mouth once daily. For gout    ciprofloxacin HCl (CIPRO) 500 MG tablet Take 1 tablet (500 mg total) by mouth 2 (two) times daily.    clotrimazole-betamethasone 1-0.05% (LOTRISONE) cream Apply topically 2 (two) times daily.     "fluconazole (DIFLUCAN) 200 MG Tab TAKE 2 TABLETS(400 MG) BY MOUTH EVERY DAY    fluconazole (DIFLUCAN) 200 MG Tab Take 2 tablets (400 mg total) by mouth once daily.    gabapentin (NEURONTIN) 100 MG capsule     levothyroxine (SYNTHROID) 25 MCG tablet TAKE 1 TABLET EVERY DAY    LORazepam (ATIVAN) 0.5 MG tablet TAKE 1 TABLET BY MOUTH EVERY 12 HOURS AS NEEDED FOR ANXIETY    meclizine (ANTIVERT) 25 mg tablet TK 1 T PO BID FOR 10 DAYS PRF DIZZINESS    ondansetron (ZOFRAN) 8 MG tablet Take 1 tablet (8 mg total) by mouth every 12 (twelve) hours as needed for Nausea.    pantoprazole (PROTONIX) 40 MG tablet Take 1 tablet (40 mg total) by mouth once daily. While on prednisone    valsartan (DIOVAN) 160 MG tablet Take 1 tablet (160 mg total) by mouth once daily.    verapamil (CALAN-SR) 240 MG CR tablet TAKE 1 TABLET EVERY DAY    VITAMIN B-1 100 MG tablet      Current Facility-Administered Medications   Medication    sodium chloride 0.9% flush 10 mL     ALLERGIES:   Review of patient's allergies indicates:   Allergen Reactions    Sulfa (sulfonamide antibiotics) Itching and Rash           REVIEW OF SYSTEMS:   Review of Systems - General ROS: fatigue, weak, decreased appetite, epistaxis  Psychological ROS: negative  Ophthalmic ROS: negative  Allergy and Immunology ROS: negative  Breast ROS: negative  Respiratory ROS: negative  Cardiovascular ROS: negative  Gastrointestinal ROS: negative  Genito-Urinary ROS: negative  Musculoskeletal ROS: negative  Neurological ROS: headache   Dermatological ROS: negative    PHYSICAL EXAM:   Vitals:    04/16/18 1309   BP: (!) 110/57   Pulse: (!) 120   Resp: 16   Temp: 98.5 °F (36.9 °C)   TempSrc: Oral   SpO2: 99%   Height: 5' 5" (1.651 m)   PainSc: 0-No pain     General - thin, in wheelchair, sleepy; appears terminally ill   HEENT - oropharynx clear  Chest and Lung - clear and diminished to auscultation bilaterally   Cardiovascular - tachycarida  with no MGR, normal S1 and S2  Abdomen-  " soft, nontender, no palpable hepatomegaly or splenomegaly  Lymph - no palpable lymphadenopathy  Heme - no bruising, petechiae, pallor  Skin - no rashes or lesions  Psych - appropriate mood and affect  Neuro- PERRL    ECOG Performance Status: (foot note - ECOG PS provided by Eastern Cooperative Oncology Group) 3 - Symptomatic, >50% confined to bed    Karnofsky Performance Score:  50%- Requires Considerable Assistance and Frequent Medical Care  DATA:   Lab Results   Component Value Date    WBC 0.64 (LL) 04/09/2018    HGB 6.3 (L) 04/09/2018    HCT 18.8 (LL) 04/09/2018    MCV 85 04/09/2018    PLT SEE COMMENT 04/09/2018     Gran # (ANC)   Date Value Ref Range Status   04/09/2018 Test Not Performed 1.8 - 7.7 K/uL Corrected     Comment:     Corrected result; previously reported as 0.0 on 04/09/2018 at 11:53.     Gran%   Date Value Ref Range Status   04/09/2018 4.3 (L) 38.0 - 73.0 % Corrected     Comment:     Corrected result; previously reported as 4.7 on 04/09/2018 at 11:53.     Lymph #   Date Value Ref Range Status   04/09/2018 Test Not Performed 1.0 - 4.8 K/uL Corrected     Comment:     Corrected result; previously reported as 0.4 on 04/09/2018 at 11:53.     Lymph%   Date Value Ref Range Status   04/09/2018 82.6 (H) 18.0 - 48.0 % Corrected     Comment:     Corrected result; previously reported as 59.4 on 04/09/2018 at 11:53.     CMP  Sodium   Date Value Ref Range Status   04/09/2018 138 136 - 145 mmol/L Final     Potassium   Date Value Ref Range Status   04/09/2018 2.8 (L) 3.5 - 5.1 mmol/L Final     Chloride   Date Value Ref Range Status   04/09/2018 96 95 - 110 mmol/L Final     CO2   Date Value Ref Range Status   04/09/2018 32 (H) 23 - 29 mmol/L Final     Glucose   Date Value Ref Range Status   04/09/2018 121 (H) 70 - 110 mg/dL Final     BUN, Bld   Date Value Ref Range Status   04/09/2018 12 8 - 23 mg/dL Final     Creatinine   Date Value Ref Range Status   04/09/2018 0.8 0.5 - 1.4 mg/dL Final     Calcium   Date Value  Ref Range Status   04/09/2018 10.1 8.7 - 10.5 mg/dL Final     Total Protein   Date Value Ref Range Status   04/09/2018 7.7 6.0 - 8.4 g/dL Final     Albumin   Date Value Ref Range Status   04/09/2018 2.1 (L) 3.5 - 5.2 g/dL Final     Total Bilirubin   Date Value Ref Range Status   04/09/2018 0.8 0.1 - 1.0 mg/dL Final     Comment:     For infants and newborns, interpretation of results should be based  on gestational age, weight and in agreement with clinical  observations.  Premature Infant recommended reference ranges:  Up to 24 hours.............<8.0 mg/dL  Up to 48 hours............<12.0 mg/dL  3-5 days..................<15.0 mg/dL  6-29 days.................<15.0 mg/dL       Alkaline Phosphatase   Date Value Ref Range Status   04/09/2018 100 55 - 135 U/L Final     AST   Date Value Ref Range Status   04/09/2018 27 10 - 40 U/L Final     ALT   Date Value Ref Range Status   04/09/2018 10 10 - 44 U/L Final     Anion Gap   Date Value Ref Range Status   04/09/2018 10 8 - 16 mmol/L Final     eGFR if    Date Value Ref Range Status   04/09/2018 >60.0 >60 mL/min/1.73 m^2 Final     eGFR if non    Date Value Ref Range Status   04/09/2018 >60.0 >60 mL/min/1.73 m^2 Final     Comment:     Calculation used to obtain the estimated glomerular filtration  rate (eGFR) is the CKD-EPI equation.        LD   Date Value Ref Range Status   08/03/2017 184 110 - 260 U/L Final     Comment:     Results are increased in hemolyzed samples.     Lab Results   Component Value Date    IRON 122 08/03/2017    TIBC 209 (L) 08/03/2017    FERRITIN 576 (H) 08/03/2017     Lab Results   Component Value Date    [id number] 362 08/03/2017     Lab Results   Component Value Date    FOLATE 35.2 (H) 08/03/2017   Pathologist Interpretation TAHIR   Pathologist Interpretation TAHIR   Collected: 08/03/17 1522   Resulting lab: [facility]   Value: REVIEWED   Comment: Electronically reviewed and signed by:   Rhianna GHOSH  MD [place]   Signed on 08/04/17 at 13:32   There is a very faint IgA kappa monoclonal band in beta. - SPEP negative     HEMOGLOBIN ELECTROPHORESIS,HGB A2 UGO.   Order: [id number]   Status:  Final result   Visible to patient:  No (Not Released) Next appt:  None Dx:  Anemia     Ref Range & Units 3yr ago 6yr ago    Hgb A2 Quant 1.5 - 3.8 % 2.4 2.2CM    Hemoglobin Bands   Hb A , Hb S and Hb A2 textCM    Hemoglobin Electrophoresis Interp   See comment    Comments: Hemoglobins A and S are present, measuring approximately 62% and 37%   respectively.  This pattern is compatible with the patient's history   of sickle cell trait, provided the patient has no recent history of   red cell transfusion.  Correlate clinically.   Interpreted by Rhianna Mao M.D.            SPECIMEN-3/15/2018  1) Bone marrow clot, left iliac crest.  2) Bone marrow core biopsy, left iliac crest.  3) Bone marrow aspirate slides.  FINAL PATHOLOGIC DIAGNOSIS  CBC DATA 3/13/2018:  RBC: 2.59 M/UL, H/H :7.1/21.5 , MCV : 83 FL, WBC: 0.47 K/UL, Gran 19.2%, Lymph 57.4 %, Mono 19.1 %,  Eosinophil 0%, Basophil 0%, Platelet: 3 K/UL.  No peripheral blood smear was submitted for evaluation.  LEFT ILIAC CREST BONE MARROW TOUCH PREP (INADEQUATE), BONE MARROW CLOT, AND BONE  MARROW CORE BIOPSY WITH:  CELLULARITY=30-40%.  CONSISTENT WITH MYELODYSPLASTIC SYNDROME WITH EXCESS BLASTS-2 AND FIBROSIS (GRADE 2).  SEE COMMENT.  MARKEDLY DECREASED ERYTHROPOIESIS.  INCREASED PLASMA CELLS.  INCREASED STORAGE IRON.  INCREASED NUMBER OF MEGAKARYOCYTES WITH DYSPLASTIC MORPHOLOGY.  COMMENT: Flow cytometry analysis of bone marrow aspirate shows lymph gate(41%) containing mostly T cells.  No B cell clonality or T-cell aberrancy is evident. CD4 to CD8 ratio is reversed. Blast gate not increased (15.7%)  with cells expression of CD2 (partial), CD 7 (partial), CD9, CD13, CD34(partial), CD11c (partial),  (partial).  CD33 is negative.  Immunohistochemical studies were  performed on the clot and core biopsy for further architecture evaluation with  adequate positive and negative controls. Scattered mixed predominantly T cells (CD3 positive) and B cells (CD20  positive) are evident. Markedly increased plasma cells ( positive) are noted. In situ hybridization for kappa  and lambda light chain is pending. MPO shows myeloid cells. E-cadherin and spectruin highlight occasional  erythroid precursors. About 6-7% CD 34 positive immature cells are evident. CD61 shows increased small  dysplastic megakaryocytes.  Findings are consistent with myelodysplastic syndrome with excess blasts 2 and fibrosis. Patient is evolving into  acute myeloid leukemia. Close follow-up is highly recommended.  Diagnosed by: Catalina Mahajan M.D.  (Electronically Signed: 2018-03-20 11:00:06)    ASSESSMENT AND PLAN:   Encounter Diagnoses   Name Primary?   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   -she   completed 6 cycles for vidaza (last  2/20/18) with interim post cycle 4 and post cycle 6 bone marrow showing clonal genetic evolution and increasing blast burden; her transfusion requirements have worsened  -patient rapidly declining; at this and multiple past appts over the last 2 months  I explained terminal nature of her condition and that given more rapid recent decline I recommended DNR/DNI status and transition to hospice care; the patients daugther and granddaughter are refusing and state they are going to seek a 2nd opinion at [facility] in [place]; I discussed that I would recommend doing that immediately given her declining PS  -discussed that I could offer supportive transfusions and ppx antibioitic  only  -ativan 0.25mg bid prn for anxiety related to health   -1/2LNS + 8mg iv zofran today   -1 unit plt , 1 unit prbc today      MGUS  -for IgA MGUS; anemia likely  due to MDS; no hypercalcemia, Cr at baseline;  Minimal bone marrow plasmacytosis; suspect this is clinically insignificant in context of her high risk MDS    ID  -completed course of levaquin for CAP on 10/25/17  -continue acyc, fluc, cipro ppx     gout  -continue allopurinol      HTN  - hold bp meds      New onset headache  - improved; recent ct scan neg; tramadol prn     Hypothyroidism  - continue synthroid    Chronic Gout  - continue allopurinol     Follow Up:  -CBC, T&S, CMP, lab in 1 week  -same labs and md appt in 2 weeks        Distress Screening Results: Psychosocial Distress screening score of Distress Score: 0 not completed. Not appropriate for completion at this visit.

## 2018-04-28 DIAGNOSIS — D70.9 NEUTROPENIA, UNSPECIFIED TYPE: ICD-10-CM

## 2018-04-30 RX ORDER — CIPROFLOXACIN 500 MG/1
TABLET ORAL
Qty: 60 TABLET | Refills: 0 | Status: SHIPPED | OUTPATIENT
Start: 2018-04-30

## 2018-05-25 LAB
ANNOTATION COMMENT IMP: NORMAL
NGS CLINCIAL TRIALS: NORMAL
NGS INDICATION OF TEST: NORMAL
NGS INTERPRETATION: NORMAL
NGS ONCOHEME PANEL GENE LIST: NORMAL
NGS PATHOGENIC MUTATIONS DETECTED: NORMAL
NGS REVIEWED BY:: NORMAL
NGS VARIANTS OF UNKNOWN SIGNIFICANCE: NORMAL
REF LAB TEST METHOD: NORMAL
SPECIMEN SOURCE: NORMAL
TEST PERFORMANCE INFO SPEC: NORMAL

## 2019-06-26 NOTE — PLAN OF CARE
----- Message from Franklyn Tee MD sent at 6/20/2019  8:15 AM CDT -----  Regarding: oncologist  Patient reports lack of clarity for himself after his talk with oncologist and asked if we could touch base, see if we could help get more clarity.     Can you Problem: Patient Care Overview  Goal: Plan of Care Review  Outcome: Ongoing (interventions implemented as appropriate)  Pt is resting in bed and is up to the bathroom. States pain in her right foot is gone. VS stable. Pt's  visited at the bedside earlier in the shift. LUE IV is patent and is currently saline locked. Pt's personal items and call bell placed within easy reach. Bed is locked, in lowest position, with side rails up x 2. Non-skid socks in place. Pt encouraged to call for assistance as needed. Will continue to monitor.

## 2021-08-23 NOTE — Clinical Note
Bone marrrow biopsy under sedation within the next week (urgent) -cbc, cmp,type and screen and md appt 1 week after bone marrow biopsy  at home:

## 2023-04-16 NOTE — Clinical Note
Continue weekly CBC with type/screen and potential transfuswion return in 2 weeks with Dr. Ruvalcaba.  Thanks 17.39

## 2024-03-27 NOTE — NURSING
Pt arrived for 1st Vidaza C1D1 following MD appt. Pt brought her consent up and it is signed from today.  Medication information given and reviewed with pt, states understanding.  Pt tolerated injections X3 SQ well to right side of abd.  Discharged to home with friend and appt calendar.  
No

## 2024-08-20 NOTE — SUBJECTIVE & OBJECTIVE
Subjective:     Interval History:   Afebrile. Blood cultures NGTD. Vanc discontinued and switched to cipro PPX and doxycycline x7day course for possible cellulitis. Pancytopenic not requiring transfusions. R foot xray 11/14/17 shows mild arthritic changes at 1st MTP joint and possible gout with small calcifications surrounding MTP joint and mild soft tissue swelling around this joint. Pt states her pain is only located in the R MTP joint and great toe. Pt states pain is improving and similar to previous gout flare pain. Colchicine x1 given yesterday. Uric acid down to 5.5 today. Will start prednisone taper over 7 days. NSAID scheduled. Possible DC tomorrow. Will need allopurinol at discharge.     Objective:     Vital Signs (Most Recent):  Temp: 97.9 °F (36.6 °C) (11/15/17 0733)  Pulse: 70 (11/15/17 0733)  Resp: 20 (11/15/17 0733)  BP: (!) 197/82 (11/15/17 0733)  SpO2: 99 % (11/15/17 0733) Vital Signs (24h Range):  Temp:  [97.2 °F (36.2 °C)-99.3 °F (37.4 °C)] 97.9 °F (36.6 °C)  Pulse:  [63-70] 70  Resp:  [17-20] 20  SpO2:  [94 %-100 %] 99 %  BP: (165-201)/(70-88) 197/82     Weight: 54.7 kg (120 lb 9.5 oz)  Body mass index is 19.76 kg/m².  Body surface area is 1.59 meters squared.    ECOG SCORE         [unfilled]    Intake/Output - Last 3 Shifts       11/13 0700 - 11/14 0659 11/14 0700 - 11/15 0659 11/15 0700 - 11/16 0659    P.O. 320      Blood 512      Total Intake(mL/kg) 832 (15.2)      Urine (mL/kg/hr) 1150 500 (0.4)     Stool 0 0 (0)     Total Output 1150 500      Net -318 -500             Stool Occurrence 0 x 0 x           Physical Exam   Constitutional: She is oriented to person, place, and time. She appears well-developed and well-nourished.   HENT:   Head: Normocephalic and atraumatic.   Right Ear: External ear normal.   Left Ear: External ear normal.   Eyes: Conjunctivae and EOM are normal. Pupils are equal, round, and reactive to light. Right eye exhibits no discharge. Left eye exhibits no discharge.   Neck:  Patient Quality Outreach    Patient is due for the following:   Physical Well Child Check    Next Steps:   No follow up needed at this time.    Type of outreach:    Chart review performed, no outreach needed.    Next Steps:  Reach out within 90 days via  NA .    Max number of attempts reached: Yes. Will try again in 90 days if patient still on fail list.    Questions for provider review:    None           Meghann Melendez         Normal range of motion. Neck supple.   Cardiovascular: Normal rate, regular rhythm, normal heart sounds and intact distal pulses.    No murmur heard.  Pulmonary/Chest: Effort normal and breath sounds normal. No respiratory distress. She has no wheezes.   Abdominal: Soft. Bowel sounds are normal. She exhibits no distension and no mass. There is no tenderness.   Musculoskeletal: Normal range of motion.   Neurological: She is alert and oriented to person, place, and time.   Skin: Skin is warm and dry. No rash noted. There is erythema.   R foot edema, erythema, painful to touch to R great toe and MTP joint, warm to touch    Psychiatric: She has a normal mood and affect. Her behavior is normal. Judgment and thought content normal.   Nursing note and vitals reviewed.      Significant Labs:   CBC:   Recent Labs  Lab 11/13/17  1153 11/14/17  0451 11/15/17  0525   WBC 1.11* 1.30* 1.28*   HGB 6.8* 7.5* 7.7*   HCT 19.9* 22.2* 22.9*   PLT 2* 61* 49*    and CMP:   Recent Labs  Lab 11/13/17  1118 11/14/17  0451 11/15/17  0525    143 138   K 3.9 3.8 3.5    110 106   CO2 26 27 25    90 88   BUN 11 15 12   CREATININE 1.1 1.0 0.9   CALCIUM 9.7 9.2 9.0   PROT 8.5* 7.6 7.8   ALBUMIN 2.9* 2.6* 2.6*   BILITOT 0.4 0.6 0.5   ALKPHOS 94 88 84   AST 17 14 14   ALT 7* <5* 6*   ANIONGAP 5* 6* 7*   EGFRNONAA 47.2* 53.0* >60.0       Diagnostic Results:  I have reviewed all pertinent imaging results/findings within the past 24 hours.

## 2025-01-21 NOTE — TELEPHONE ENCOUNTER
----- Message from Juan Wang MD sent at 8/22/2017  1:13 PM CDT -----  I know patient can not come earlier today, can you call her to see if she can come another day this week?   It will not affect her treatment. I have md appt at 3:30.   Pt came to clinic appt on 08/22/17  Hina  
No indicators present

## (undated) DEVICE — NDL SPINAL 20GX3.5 HUB

## (undated) DEVICE — SEE MEDLINE ITEM 157128

## (undated) DEVICE — DRESSING LEUKOPLAST FLEX 1X3IN

## (undated) DEVICE — SYR SLIP TIP 10ML SHIELD